# Patient Record
Sex: FEMALE | Race: WHITE | NOT HISPANIC OR LATINO | Employment: OTHER | URBAN - NONMETROPOLITAN AREA
[De-identification: names, ages, dates, MRNs, and addresses within clinical notes are randomized per-mention and may not be internally consistent; named-entity substitution may affect disease eponyms.]

---

## 2024-07-10 ENCOUNTER — OFFICE VISIT (OUTPATIENT)
Dept: SURGERY | Facility: CLINIC | Age: 69
End: 2024-07-10
Payer: MEDICARE

## 2024-07-10 ENCOUNTER — CLINICAL SUPPORT (OUTPATIENT)
Dept: PREADMISSION TESTING | Facility: HOSPITAL | Age: 69
End: 2024-07-10
Payer: COMMERCIAL

## 2024-07-10 VITALS
TEMPERATURE: 98 F | HEIGHT: 64 IN | SYSTOLIC BLOOD PRESSURE: 144 MMHG | WEIGHT: 208 LBS | DIASTOLIC BLOOD PRESSURE: 82 MMHG | BODY MASS INDEX: 35.51 KG/M2 | HEART RATE: 90 BPM

## 2024-07-10 DIAGNOSIS — K62.89 MASS OF ANUS: Primary | ICD-10-CM

## 2024-07-10 PROCEDURE — 1160F RVW MEDS BY RX/DR IN RCRD: CPT | Performed by: SURGERY

## 2024-07-10 PROCEDURE — 88305 TISSUE EXAM BY PATHOLOGIST: CPT | Performed by: PATHOLOGY

## 2024-07-10 PROCEDURE — 99204 OFFICE O/P NEW MOD 45 MIN: CPT | Performed by: SURGERY

## 2024-07-10 PROCEDURE — 1159F MED LIST DOCD IN RCRD: CPT | Performed by: SURGERY

## 2024-07-10 PROCEDURE — 88305 TISSUE EXAM BY PATHOLOGIST: CPT

## 2024-07-10 PROCEDURE — 11104 PUNCH BX SKIN SINGLE LESION: CPT | Performed by: SURGERY

## 2024-07-10 RX ORDER — AMLODIPINE BESYLATE 10 MG/1
10 TABLET ORAL
COMMUNITY
Start: 2024-05-21 | End: 2024-08-19

## 2024-07-10 RX ORDER — LOSARTAN POTASSIUM 100 MG/1
100 TABLET ORAL
COMMUNITY
Start: 2024-06-18

## 2024-07-10 RX ORDER — BUSPIRONE HYDROCHLORIDE 10 MG/1
10 TABLET ORAL
COMMUNITY
Start: 2024-07-08

## 2024-07-10 RX ORDER — GUAIFENESIN 1200 MG
TABLET, EXTENDED RELEASE 12 HR ORAL
COMMUNITY

## 2024-07-10 RX ORDER — AZELASTINE HYDROCHLORIDE 0.5 MG/ML
SOLUTION/ DROPS OPHTHALMIC
COMMUNITY
Start: 2023-09-02

## 2024-07-10 RX ORDER — OMEPRAZOLE 40 MG/1
40 CAPSULE, DELAYED RELEASE ORAL
COMMUNITY
Start: 2024-05-10

## 2024-07-10 RX ORDER — HYDROCORTISONE 25 MG/G
CREAM TOPICAL 2 TIMES DAILY
COMMUNITY
Start: 2024-07-09 | End: 2024-10-07

## 2024-07-10 RX ORDER — ATORVASTATIN CALCIUM 20 MG/1
20 TABLET, FILM COATED ORAL
COMMUNITY
Start: 2024-05-21

## 2024-07-10 RX ORDER — LANOLIN ALCOHOL/MO/W.PET/CERES
1 CREAM (GRAM) TOPICAL
COMMUNITY
Start: 2024-06-11

## 2024-07-10 RX ORDER — ERGOCALCIFEROL 1.25 MG/1
50000 CAPSULE ORAL WEEKLY
COMMUNITY
Start: 2024-06-06

## 2024-07-10 RX ORDER — MINERAL OIL
180 ENEMA (ML) RECTAL DAILY
COMMUNITY

## 2024-07-10 RX ORDER — MECLIZINE HCL 12.5 MG 12.5 MG/1
12.5 TABLET ORAL EVERY 6 HOURS PRN
COMMUNITY
Start: 2023-09-18

## 2024-07-10 RX ORDER — AMLODIPINE BESYLATE 2.5 MG/1
1 TABLET ORAL
COMMUNITY
Start: 2023-11-04 | End: 2024-07-10 | Stop reason: WASHOUT

## 2024-07-10 RX ORDER — PANCRELIPASE LIPASE, PANCRELIPASE PROTEASE, PANCRELIPASE AMYLASE 40000; 126000; 168000 [USP'U]/1; [USP'U]/1; [USP'U]/1
CAPSULE, DELAYED RELEASE ORAL
COMMUNITY
Start: 2023-08-08

## 2024-07-10 ASSESSMENT — DUKE ACTIVITY SCORE INDEX (DASI)
CAN YOU DO HEAVY WORK AROUND THE HOUSE LIKE SCRUBBING FLOORS OR LIFTING AND MOVING HEAVY FURNITURE: YES
CAN YOU WALK INDOORS, SUCH AS AROUND YOUR HOUSE: YES
TOTAL_SCORE: 45.45
CAN YOU DO LIGHT WORK AROUND THE HOUSE LIKE DUSTING OR WASHING DISHES: YES
CAN YOU WALK A BLOCK OR TWO ON LEVEL GROUND: YES
CAN YOU PARTICIPATE IN STRENOUS SPORTS LIKE SWIMMING, SINGLES TENNIS, FOOTBALL, BASKETBALL, OR SKIING: NO
CAN YOU PARTICIPATE IN MODERATE RECREATIONAL ACTIVITIES LIKE GOLF, BOWLING, DANCING, DOUBLES TENNIS OR THROWING A BASEBALL OR FOOTBALL: YES
CAN YOU RUN A SHORT DISTANCE: YES
DASI METS SCORE: 8.3
CAN YOU DO YARD WORK LIKE RAKING LEAVES, WEEDING OR PUSHING A MOWER: YES
CAN YOU DO MODERATE WORK AROUND THE HOUSE LIKE VACUUMING, SWEEPING FLOORS OR CARRYING GROCERIES: YES
CAN YOU HAVE SEXUAL RELATIONS: NO
CAN YOU TAKE CARE OF YOURSELF (EAT, DRESS, BATHE, OR USE TOILET): YES
CAN YOU CLIMB A FLIGHT OF STAIRS OR WALK UP A HILL: YES

## 2024-07-10 NOTE — H&P (VIEW-ONLY)
Subjective   Patient ID: Raquel Palacios is a 69 y.o. female who presents for a possible thrombosed hemorrhoid.    HPI   This patient noticed a bulge in the perianal area about 3 weeks ago.  It has been uncomfortable and swollen.  She has had no other abdominal complaints.  She had negative colonoscopy 4 years ago.  She is not on any anticoagulation    History reviewed. No pertinent past medical history.     Current Outpatient Medications on File Prior to Visit   Medication Sig Dispense Refill    amLODIPine (Norvasc) 10 mg tablet Take 1 tablet (10 mg) by mouth once daily.      amLODIPine (Norvasc) 2.5 mg tablet Take 1 tablet (2.5 mg) by mouth early in the morning..      atorvastatin (Lipitor) 20 mg tablet Take 1 tablet (20 mg) by mouth once daily.      azelastine (Optivar) 0.05 % ophthalmic solution INSTILL 1 DROP INTO AFFECTED EYE(S) TWICE A DAY AS NEEDED      busPIRone (Buspar) 10 mg tablet Take 1 tablet (10 mg) by mouth once daily.      ergocalciferol (Vitamin D-2) 1.25 MG (11246 UT) capsule Take 1 capsule (50,000 Units) by mouth once a week.      hydrocortisone (Anusol-HC) 2.5 % rectal cream Insert into the rectum twice a day.      lipase-protease-amylase (Zenpep) 40,000-126,000- 168,000 unit capsule 2 pills per meal, 1 per snack, 8 pills per day      losartan (Cozaar) 100 mg tablet Take 1 tablet (100 mg) by mouth once daily.      magnesium oxide (Mag-Ox) 400 mg (241.3 mg magnesium) tablet Take 1 tablet (400 mg) by mouth early in the morning..      meclizine (Antivert) 12.5 mg tablet Take 1 tablet (12.5 mg) by mouth every 6 hours if needed.      omeprazole (PriLOSEC) 40 mg DR capsule Take 1 capsule (40 mg) by mouth once daily.      acetaminophen (Tylenol) 325 mg capsule Take by mouth.      fexofenadine (Allegra) 180 mg tablet Take 1 tablet (180 mg) by mouth once daily.       No current facility-administered medications on file prior to visit.        Review of Systems   All other systems reviewed and are  negative.      Vitals:    07/10/24 1244   BP: 144/82   Pulse: 90   Temp: 36.7 °C (98 °F)        Objective     Physical Exam  Vitals reviewed. Exam conducted with a chaperone present.   Constitutional:       Appearance: Normal appearance.   HENT:      Head: Normocephalic.   Cardiovascular:      Rate and Rhythm: Normal rate and regular rhythm.      Heart sounds: Normal heart sounds.   Pulmonary:      Effort: Pulmonary effort is normal.      Breath sounds: Normal breath sounds.   Abdominal:      General: Abdomen is flat.      Palpations: Abdomen is soft. There is no mass.      Tenderness: There is no abdominal tenderness. There is no guarding.      Comments: Posterior polypoid anal mass   Musculoskeletal:         General: Normal range of motion.      Cervical back: Normal range of motion.   Skin:     General: Skin is warm.   Neurological:      General: No focal deficit present.   Psychiatric:         Mood and Affect: Mood normal.       Patient ID: Raquel Palacios is a 69 y.o. female.    General    Date/Time: 7/10/2024 1:11 PM    Performed by: Jason Bell MD  Authorized by: Jason Bell MD    Consent:     Consent obtained:  Verbal    Risks discussed:  Bleeding  Universal protocol:     Procedure explained and questions answered to patient or proxy's satisfaction: yes    Procedure specific details:      The anal mass was identified.  Small biopsy was performed using a scissors.  This was sent to pathology.    Problem List Items Addressed This Visit       Mass of anus - Primary        Assessment/Plan   Posterior anal mass.  Biopsy performed.   Colonoscopy 7.11.24       Jason Bell MD

## 2024-07-10 NOTE — PROGRESS NOTES
Subjective   Patient ID: Raquel Palacios is a 69 y.o. female who presents for a possible thrombosed hemorrhoid.    HPI   This patient noticed a bulge in the perianal area about 3 weeks ago.  It has been uncomfortable and swollen.  She has had no other abdominal complaints.  She had negative colonoscopy 4 years ago.  She is not on any anticoagulation    History reviewed. No pertinent past medical history.     Current Outpatient Medications on File Prior to Visit   Medication Sig Dispense Refill    amLODIPine (Norvasc) 10 mg tablet Take 1 tablet (10 mg) by mouth once daily.      amLODIPine (Norvasc) 2.5 mg tablet Take 1 tablet (2.5 mg) by mouth early in the morning..      atorvastatin (Lipitor) 20 mg tablet Take 1 tablet (20 mg) by mouth once daily.      azelastine (Optivar) 0.05 % ophthalmic solution INSTILL 1 DROP INTO AFFECTED EYE(S) TWICE A DAY AS NEEDED      busPIRone (Buspar) 10 mg tablet Take 1 tablet (10 mg) by mouth once daily.      ergocalciferol (Vitamin D-2) 1.25 MG (09212 UT) capsule Take 1 capsule (50,000 Units) by mouth once a week.      hydrocortisone (Anusol-HC) 2.5 % rectal cream Insert into the rectum twice a day.      lipase-protease-amylase (Zenpep) 40,000-126,000- 168,000 unit capsule 2 pills per meal, 1 per snack, 8 pills per day      losartan (Cozaar) 100 mg tablet Take 1 tablet (100 mg) by mouth once daily.      magnesium oxide (Mag-Ox) 400 mg (241.3 mg magnesium) tablet Take 1 tablet (400 mg) by mouth early in the morning..      meclizine (Antivert) 12.5 mg tablet Take 1 tablet (12.5 mg) by mouth every 6 hours if needed.      omeprazole (PriLOSEC) 40 mg DR capsule Take 1 capsule (40 mg) by mouth once daily.      acetaminophen (Tylenol) 325 mg capsule Take by mouth.      fexofenadine (Allegra) 180 mg tablet Take 1 tablet (180 mg) by mouth once daily.       No current facility-administered medications on file prior to visit.        Review of Systems   All other systems reviewed and are  negative.      Vitals:    07/10/24 1244   BP: 144/82   Pulse: 90   Temp: 36.7 °C (98 °F)        Objective     Physical Exam  Vitals reviewed. Exam conducted with a chaperone present.   Constitutional:       Appearance: Normal appearance.   HENT:      Head: Normocephalic.   Cardiovascular:      Rate and Rhythm: Normal rate and regular rhythm.      Heart sounds: Normal heart sounds.   Pulmonary:      Effort: Pulmonary effort is normal.      Breath sounds: Normal breath sounds.   Abdominal:      General: Abdomen is flat.      Palpations: Abdomen is soft. There is no mass.      Tenderness: There is no abdominal tenderness. There is no guarding.      Comments: Posterior polypoid anal mass   Musculoskeletal:         General: Normal range of motion.      Cervical back: Normal range of motion.   Skin:     General: Skin is warm.   Neurological:      General: No focal deficit present.   Psychiatric:         Mood and Affect: Mood normal.       Patient ID: Raquel Palacios is a 69 y.o. female.    General    Date/Time: 7/10/2024 1:11 PM    Performed by: Jason Bell MD  Authorized by: Jason Bell MD    Consent:     Consent obtained:  Verbal    Risks discussed:  Bleeding  Universal protocol:     Procedure explained and questions answered to patient or proxy's satisfaction: yes    Procedure specific details:      The anal mass was identified.  Small biopsy was performed using a scissors.  This was sent to pathology.    Problem List Items Addressed This Visit       Mass of anus - Primary        Assessment/Plan   Posterior anal mass.  Biopsy performed.   Colonoscopy 7.11.24       Jason Bell MD

## 2024-07-10 NOTE — PREPROCEDURE INSTRUCTIONS
Medication List            Accurate as of July 10, 2024  2:10 PM. Always use your most recent med list.                acetaminophen 325 mg capsule  Commonly known as: Tylenol  Medication Adjustments for Surgery: Take morning of surgery with sip of water, no other fluids     amLODIPine 10 mg tablet  Commonly known as: Norvasc  Medication Adjustments for Surgery: Take morning of surgery with sip of water, no other fluids     atorvastatin 20 mg tablet  Commonly known as: Lipitor  Medication Adjustments for Surgery: Continue until night before surgery     azelastine 0.05 % ophthalmic solution  Commonly known as: Optivar  Medication Adjustments for Surgery: Other (Comment)     busPIRone 10 mg tablet  Commonly known as: Buspar  Medication Adjustments for Surgery: Continue until night before surgery     ergocalciferol 1.25 MG (70549 UT) capsule  Commonly known as: Vitamin D-2  Medication Adjustments for Surgery: Other (Comment)     fexofenadine 180 mg tablet  Commonly known as: Allegra  Medication Adjustments for Surgery: Take morning of surgery with sip of water, no other fluids     hydrocortisone 2.5 % rectal cream  Commonly known as: Anusol-HC  Medication Adjustments for Surgery: Continue until night before surgery     losartan 100 mg tablet  Commonly known as: Cozaar  Medication Adjustments for Surgery: Continue until night before surgery     magnesium oxide 400 mg (241.3 mg magnesium) tablet  Commonly known as: Mag-Ox  Medication Adjustments for Surgery: Stop 1 day before surgery     meclizine 12.5 mg tablet  Commonly known as: Antivert  Medication Adjustments for Surgery: Other (Comment)     omeprazole 40 mg DR capsule  Commonly known as: PriLOSEC  Medication Adjustments for Surgery: Take morning of surgery with sip of water, no other fluids     Zenpep 40,000-126,000- 168,000 unit capsule  Generic drug: lipase-protease-amylase  Medication Adjustments for Surgery: Other (Comment)                   - Call  Outpatient Surgery the day before procedure/surgery (Friday for a Monday procedure) between 1-3 pm to get your arrival time:  [phone]    -You can have clear liquids ( Water, Tea, Pop/Soda, Gatorade/Powerade, Black coffee) up to 3 hours before your procedure. NO JUICES, NO DAIRY, NO CREAMERS, NO HALF/HALF, NO NON-DAIRY CREAMERS/POWDERS.    -Please refrain from smoking THC, cigarettes, and/or vaping prior to your procedure for at least 24 hours.     - On day of procedure, have a  (if having anesthesia or sedation). Park in the back parking lot and come through the ER lobby. Take elevator to second floor outpatient dept. Check in at the outpatient surgery window.    - Wear comfortable clothes, remove all jewelry and piercings.    -Glasses, contacts, and/or dentures may have to be removed. Bring a glass/contact case. A denture cup will be provided.    -Please shower or bathe in the morning using an antibacterial soap. No makeup. Follow instructions if you are issued Hibiclens soap and/or mouth wash.     -No more than 2 visitors at your bedside.

## 2024-07-10 NOTE — PATIENT INSTRUCTIONS
You have a mass in the anal region.  I performed a biopsy.  As he did not eat anything today we will schedule you for a colonoscopy tomorrow on 7/11/2024.  My office will provide you with preprocedure instructions.

## 2024-07-11 ENCOUNTER — ANESTHESIA EVENT (OUTPATIENT)
Dept: GASTROENTEROLOGY | Facility: HOSPITAL | Age: 69
End: 2024-07-11
Payer: MEDICARE

## 2024-07-11 ENCOUNTER — HOSPITAL ENCOUNTER (OUTPATIENT)
Dept: GASTROENTEROLOGY | Facility: HOSPITAL | Age: 69
Setting detail: OUTPATIENT SURGERY
Discharge: HOME | End: 2024-07-11
Payer: MEDICARE

## 2024-07-11 ENCOUNTER — ANESTHESIA (OUTPATIENT)
Dept: GASTROENTEROLOGY | Facility: HOSPITAL | Age: 69
End: 2024-07-11
Payer: MEDICARE

## 2024-07-11 VITALS
WEIGHT: 208 LBS | RESPIRATION RATE: 16 BRPM | HEIGHT: 64 IN | OXYGEN SATURATION: 91 % | DIASTOLIC BLOOD PRESSURE: 72 MMHG | SYSTOLIC BLOOD PRESSURE: 119 MMHG | TEMPERATURE: 97.2 F | HEART RATE: 84 BPM | BODY MASS INDEX: 35.51 KG/M2

## 2024-07-11 DIAGNOSIS — K62.89 MASS OF ANUS: ICD-10-CM

## 2024-07-11 DIAGNOSIS — K64.5 THROMBOSED EXTERNAL HEMORRHOID: Primary | ICD-10-CM

## 2024-07-11 PROBLEM — I10 HTN (HYPERTENSION): Status: ACTIVE | Noted: 2024-07-11

## 2024-07-11 PROCEDURE — 2500000004 HC RX 250 GENERAL PHARMACY W/ HCPCS (ALT 636 FOR OP/ED): Performed by: PHYSICIAN ASSISTANT

## 2024-07-11 PROCEDURE — 46083 INC THROMBOSED HROID XTRNL: CPT | Performed by: SURGERY

## 2024-07-11 PROCEDURE — 3700000002 HC GENERAL ANESTHESIA TIME - EACH INCREMENTAL 1 MINUTE

## 2024-07-11 PROCEDURE — 7100000009 HC PHASE TWO TIME - INITIAL BASE CHARGE

## 2024-07-11 PROCEDURE — 7100000010 HC PHASE TWO TIME - EACH INCREMENTAL 1 MINUTE

## 2024-07-11 PROCEDURE — 2500000005 HC RX 250 GENERAL PHARMACY W/O HCPCS: Performed by: NURSE ANESTHETIST, CERTIFIED REGISTERED

## 2024-07-11 PROCEDURE — 3700000001 HC GENERAL ANESTHESIA TIME - INITIAL BASE CHARGE

## 2024-07-11 PROCEDURE — 45380 COLONOSCOPY AND BIOPSY: CPT | Performed by: SURGERY

## 2024-07-11 PROCEDURE — 2500000004 HC RX 250 GENERAL PHARMACY W/ HCPCS (ALT 636 FOR OP/ED): Performed by: NURSE ANESTHETIST, CERTIFIED REGISTERED

## 2024-07-11 RX ORDER — LIDOCAINE HYDROCHLORIDE 20 MG/ML
INJECTION, SOLUTION EPIDURAL; INFILTRATION; INTRACAUDAL; PERINEURAL AS NEEDED
Status: DISCONTINUED | OUTPATIENT
Start: 2024-07-11 | End: 2024-07-11

## 2024-07-11 RX ORDER — ESCITALOPRAM OXALATE 20 MG/1
20 TABLET ORAL DAILY
COMMUNITY

## 2024-07-11 RX ORDER — PROPOFOL 10 MG/ML
INJECTION, EMULSION INTRAVENOUS AS NEEDED
Status: DISCONTINUED | OUTPATIENT
Start: 2024-07-11 | End: 2024-07-11

## 2024-07-11 RX ORDER — ONDANSETRON HYDROCHLORIDE 2 MG/ML
4 INJECTION, SOLUTION INTRAVENOUS ONCE AS NEEDED
Status: CANCELLED | OUTPATIENT
Start: 2024-07-11

## 2024-07-11 RX ORDER — SODIUM CHLORIDE, SODIUM LACTATE, POTASSIUM CHLORIDE, CALCIUM CHLORIDE 600; 310; 30; 20 MG/100ML; MG/100ML; MG/100ML; MG/100ML
100 INJECTION, SOLUTION INTRAVENOUS CONTINUOUS
Status: DISCONTINUED | OUTPATIENT
Start: 2024-07-11 | End: 2024-07-12 | Stop reason: HOSPADM

## 2024-07-11 SDOH — HEALTH STABILITY: MENTAL HEALTH: CURRENT SMOKER: 1

## 2024-07-11 ASSESSMENT — COLUMBIA-SUICIDE SEVERITY RATING SCALE - C-SSRS
6. HAVE YOU EVER DONE ANYTHING, STARTED TO DO ANYTHING, OR PREPARED TO DO ANYTHING TO END YOUR LIFE?: NO
2. HAVE YOU ACTUALLY HAD ANY THOUGHTS OF KILLING YOURSELF?: NO
1. IN THE PAST MONTH, HAVE YOU WISHED YOU WERE DEAD OR WISHED YOU COULD GO TO SLEEP AND NOT WAKE UP?: NO

## 2024-07-11 ASSESSMENT — PAIN - FUNCTIONAL ASSESSMENT
PAIN_FUNCTIONAL_ASSESSMENT: 0-10

## 2024-07-11 ASSESSMENT — PAIN SCALES - GENERAL
PAINLEVEL_OUTOF10: 0 - NO PAIN
PAINLEVEL_OUTOF10: 4
PAINLEVEL_OUTOF10: 0 - NO PAIN
PAINLEVEL_OUTOF10: 0 - NO PAIN

## 2024-07-11 NOTE — DISCHARGE INSTRUCTIONS
Patient Instructions after a Colonoscopy      The anesthetics, sedatives or narcotics which were given to you today will be acting in your body for the next 24 hours, so you might feel a little sleepy or groggy.  This feeling should slowly wear off. Carefully read and follow the instructions.     You received sedation today:  - Do not drive or operate any machinery or power tools of any kind.   - No alcoholic beverages today, not even beer or wine.  - Do not make any important decisions or sign any legal documents.  - No over the counter medications that contain alcohol or that may cause drowsiness.  - Do not make any important decisions or sign any legal documents.  - Make sure you have someone with you for first 24 hours.    While it is common to experience mild to moderate abdominal distention, gas, or belching after your procedure, if any of these symptoms occur following discharge from the GI Lab or within one week of having your procedure, call the [facility] to be advised whether a visit to your nearest Urgent Care or Emergency Department is indicated.  Take this paper with you if you go.     - If you develop an allergic reaction to the medications that were given during your procedure such as difficulty breathing, rash, hives, severe nausea, vomiting or lightheadedness.  - If you experience chest pain, shortness of breath, severe abdominal pain, fevers and chills.  -If you develop signs and symptoms of bleeding such as blood in your spit, if your stools turn black, tarry, or bloody  - If you have not urinated within 8 hours following your procedure.  - If your IV site becomes painful, red, inflamed, or looks infected.    If you received a biopsy/polypectomy/sphincterotomy the following instructions apply below:    __ Do not use Aspirin containing products, non-steroidal medications or anti-coagulants for one week following your procedure. (Examples of these types of medications are: Advil,  Arthrotec, Aleve, Coumadin, Ecotrin, Heparin, Ibuprofen, Indocin, Motrin, Naprosyn, Nuprin, Plavix, Vioxx, and Voltarin, or their generic forms.  This list is not all-inclusive.  Check with your physician or pharmacist before resuming medications.)   __ Eat a soft diet today.  Avoid foods that are poorly digested for the next 24 hours.  These foods would include: nuts, beans, lettuce, red meats, and fried foods. Start with liquids and advance your diet as tolerated, gradually work up to eating solids.   __ Do not have a Barium Study or Enema for one week.    Your physician recommends the additional following instructions:    -You have a contact number available for emergencies. The signs and symptoms of potential delayed complications were discussed with you. You may return to normal activities tomorrow.  -Resume your previous diet.  -Continue your present medications.   -We are waiting for your pathology results.  -Your physician has recommended a repeat colonoscopy (date to be determined after pending pathology results are reviewed) for surveillance based on pathology results.  -The findings and recommendations have been discussed with you.  -The findings and recommendations were discussed with your family.  - Please see Medication Reconciliation Form for new medication/medications prescribed.       If you experience any problems or have any questions following discharge from the GI Lab, please call:        Nurse Signature                                                                        Date___________________                                                                            Patient/Responsible Party Signature                                        Date___________________

## 2024-07-11 NOTE — PROCEDURES
Incision and Drainage    Date/Time: 7/11/2024 7:57 AM    Performed by: Jason Bell MD  Authorized by: Jason Bell MD    Consent:     Consent obtained:  Verbal  Universal protocol:     Procedure explained and questions answered to patient or proxy's satisfaction: yes      Patient identity confirmed:  Verbally with patient  Location:     Type:  External thrombosed hemorrhoid    Location:  Anogenital    Anogenital location:  Rectum  Procedure details:     Incision types:  Elliptical  Comments:      The external thrombosed hemorrhoid was excised a large clot was extruded.  Dressings were placed.

## 2024-07-11 NOTE — ANESTHESIA POSTPROCEDURE EVALUATION
Patient: Raquel Palacios    Procedure Summary       Date: 07/11/24 Room / Location: [facility]    Anesthesia Start: 0723 Anesthesia Stop: 0749    Procedure: COLONOSCOPY Diagnosis:       Mass of anus      Mass of anus    Scheduled Providers: Jason Bell MD Responsible Provider: DIONICIO Sterling    Anesthesia Type: MAC ASA Status: 2            Anesthesia Type: MAC    Vitals Value Taken Time   /72 07/11/24 0805   Temp 36.2 °C (97.2 °F) 07/11/24 0749   Pulse 84 07/11/24 0805   Resp 16 07/11/24 0805   SpO2 94% 07/11/24 0749       Anesthesia Post Evaluation    Patient location during evaluation: PACU  Patient participation: complete - patient participated  Level of consciousness: awake and alert and awake  Pain management: adequate  Airway patency: patent  Cardiovascular status: acceptable  Respiratory status: room air, spontaneous ventilation and acceptable  Hydration status: acceptable  Postoperative Nausea and Vomiting: none        There were no known notable events for this encounter.

## 2024-07-11 NOTE — ANESTHESIA PREPROCEDURE EVALUATION
Patient: Raquel Palacios    Procedure Information       Date/Time: 07/11/24 0815    Scheduled providers: Jason Bell MD    Procedure: COLONOSCOPY    Location: [facility]            Relevant Problems   Anesthesia (within normal limits)      Cardiac   (+) HTN (hypertension)      Pulmonary (within normal limits)      GI (within normal limits)      Endocrine (within normal limits)       Clinical information reviewed:   Tobacco  Allergies  Meds   Med Hx  Surg Hx  OB Status  Fam Hx  Soc   Hx        NPO Detail:  NPO/Void Status  Carbohydrate Drink Given Prior to Surgery? : N  Date of Last Liquid: 07/11/24  Time of Last Liquid: 0500  Date of Last Solid: 07/09/24  Time of Last Solid: 1800  Last Intake Type: Clear fluids         Physical Exam    Airway  Mallampati: III  TM distance: >3 FB     Cardiovascular   Rhythm: regular  Rate: normal     Dental    Pulmonary - normal exam     Abdominal            Anesthesia Plan    History of general anesthesia?: yes  History of complications of general anesthesia?: no    ASA 2     MAC     The patient is a current smoker.  Patient was previously instructed to abstain from smoking on day of procedure.  Patient smoked on day of procedure.    intravenous induction   Anesthetic plan and risks discussed with patient.  Use of blood products discussed with patient who consented to blood products.    Plan discussed with CRNA.

## 2024-07-11 NOTE — SIGNIFICANT EVENT
Dr Bell at bedside speaking with patient and family member.  Dr. Jennifer diaz to go home with patient.

## 2024-07-22 ENCOUNTER — TELEPHONE (OUTPATIENT)
Dept: SURGERY | Facility: HOSPITAL | Age: 69
End: 2024-07-22
Payer: MEDICARE

## 2024-07-22 DIAGNOSIS — C21.0 SQUAMOUS CELL CANCER, ANUS (MULTI): ICD-10-CM

## 2024-07-22 DIAGNOSIS — C21.0 SQUAMOUS CELL CANCER, ANUS (MULTI): Primary | ICD-10-CM

## 2024-07-22 LAB
LAB AP ASR DISCLAIMER: NORMAL
LABORATORY COMMENT REPORT: NORMAL
PATH REPORT.FINAL DX SPEC: NORMAL
PATH REPORT.GROSS SPEC: NORMAL
PATH REPORT.TOTAL CANCER: NORMAL

## 2024-07-22 NOTE — TELEPHONE ENCOUNTER
I spoke with the patient today re new diagnosis of SCC anus. Will require chemo / radiation - referrals made / CT C/A/P ordered

## 2024-07-23 ENCOUNTER — LAB (OUTPATIENT)
Dept: LAB | Facility: LAB | Age: 69
End: 2024-07-23
Payer: MEDICARE

## 2024-07-23 ENCOUNTER — HOSPITAL ENCOUNTER (OUTPATIENT)
Dept: RADIOLOGY | Facility: HOSPITAL | Age: 69
Discharge: HOME | End: 2024-07-23
Payer: MEDICARE

## 2024-07-23 DIAGNOSIS — C21.0 SQUAMOUS CELL CANCER, ANUS (MULTI): ICD-10-CM

## 2024-07-23 LAB
CREAT SERPL-MCNC: 1.19 MG/DL (ref 0.5–1.05)
EGFRCR SERPLBLD CKD-EPI 2021: 50 ML/MIN/1.73M*2

## 2024-07-23 PROCEDURE — 71260 CT THORAX DX C+: CPT | Performed by: RADIOLOGY

## 2024-07-23 PROCEDURE — 82565 ASSAY OF CREATININE: CPT

## 2024-07-23 PROCEDURE — 74177 CT ABD & PELVIS W/CONTRAST: CPT | Performed by: RADIOLOGY

## 2024-07-23 PROCEDURE — 2550000001 HC RX 255 CONTRASTS: Performed by: SURGERY

## 2024-07-23 PROCEDURE — 74177 CT ABD & PELVIS W/CONTRAST: CPT

## 2024-07-23 PROCEDURE — 36415 COLL VENOUS BLD VENIPUNCTURE: CPT

## 2024-07-23 NOTE — PROGRESS NOTES
Raquel Palacios is a 69 year old female referred by Dr. 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    7/11/2024 Colonoscopy to cecum (colored photos in Epic)  Findings  Single adenomatous-appearing mass in the posterior anal region observed during perianal exam, covering one quarter of the circumference; bleeding occurred after intervention; performed cold forceps biopsy  One left lateral external medium, protruding hemorrhoid observed during perianal exam; no bleeding was identified. Incised  Multiple medium diverticula in the ascending colon, hepatic flexure, transverse colon, descending colon and sigmoid colon  Pathology:  A. ANUS BIOPSY:   Squamous cell carcinoma, see note  Note: Immunostain for p40 is positive and immunostain for p16 is diffusely positive (strong).       7/24/2024 Appointment with oncology    8/06/2024 CT Chest/Abd/Pelvis with contrast        Past Medical History  HTN  Anal SCC      Surgical History  Hernia Repai      Social History  Smoking:   ETOH:    Family History        Review of Systems  Constitutional: Negative for fever, chills, anorexia, weight loss, malaise     ENMT: Negative for nasal discharge, congestion, ear pain, mouth pain, throat pain     Respiratory: Negative for cough, hemoptysis, wheezing, shortness of breath     Cardiac: Negative for chest pain, dyspnea on exertion, orthopnea, palpitations, syncope, (+)HTN     Gastrointestinal: Negative for nausea, vomiting, diarrhea, constipation, abdominal pain, (+)ANAL SCC    Genitourinary: Negative for discharge, dysuria, flank pain, frequency, hematuria     Musculoskeletal: Negative for decreased ROM, pain, swelling, weakness     Neurological: Negative for dizziness, confusion, headache, seizures, syncope     Psychiatric: Negative for mood changes, anxiety, hallucinations, sleep changes, suicidal ideas     Skin: Negative for mass, pain, itching, rash, ulcer     Endocrine: Negative for heat intolerance, cold  intolerance, excessive sweating, polyuria, excess thirst     Hematologic/Lymph: Negative for anemia, bruising, easy bleeding, night sweats, petechiae, history of DVT/PE or cancer     Allergic/Immunologic: Negative for anaphylaxis, itchy/ teary eyes, itching, sneezing, swelling    Physical Exam  Constitutional: Well developed, awake/alert/oriented x3, no distress, alert and cooperative             Eyes: Sclera anicteric, no conjunctival inflammation, conjugate gaze    ENMT: mucous membranes moist, no apparent injury,            Head/Neck: Neck supple, no apparent injury, No JVD, trachea midline, no bruits              Respiratory/Thorax: Patent airways, CTAB, normal breath sounds with good chest expansion, thorax symmetric         Cardiovascular: Regular, rate and rhythm, no murmurs, normal S1 and S2         Gastrointestinal: Nondistended, soft, non-tender, no rebound tenderness or guarding, no masses palpable, no organomegaly, +BS, no bruits               Extremities: normal extremities, no cyanosis edema, contusions or wounds, 2+ femoral pulses B/L              Neurological: alert and oriented x3, normal strength, Normal gait          Lymphatic: No palpable inguinal lymphadenopathy   Psychological: Appropriate mood and behavior         Skin: Warm and dry, no lesions, no rashes                Anorectal:      Impression:      Plan:

## 2024-07-24 ENCOUNTER — LAB (OUTPATIENT)
Dept: LAB | Facility: LAB | Age: 69
End: 2024-07-24
Payer: MEDICARE

## 2024-07-24 ENCOUNTER — OFFICE VISIT (OUTPATIENT)
Dept: HEMATOLOGY/ONCOLOGY | Facility: HOSPITAL | Age: 69
End: 2024-07-24
Payer: MEDICARE

## 2024-07-24 VITALS
OXYGEN SATURATION: 97 % | HEIGHT: 64 IN | HEART RATE: 94 BPM | DIASTOLIC BLOOD PRESSURE: 85 MMHG | SYSTOLIC BLOOD PRESSURE: 124 MMHG | BODY MASS INDEX: 35.44 KG/M2 | TEMPERATURE: 98.4 F | WEIGHT: 207.56 LBS

## 2024-07-24 DIAGNOSIS — C21.0 SQUAMOUS CELL CANCER, ANUS (MULTI): ICD-10-CM

## 2024-07-24 DIAGNOSIS — R53.83 OTHER FATIGUE: ICD-10-CM

## 2024-07-24 DIAGNOSIS — C21.0 SQUAMOUS CELL CANCER, ANUS (MULTI): Primary | ICD-10-CM

## 2024-07-24 LAB
ALBUMIN SERPL BCP-MCNC: 4.2 G/DL (ref 3.4–5)
ALP SERPL-CCNC: 126 U/L (ref 33–136)
ALT SERPL W P-5'-P-CCNC: 22 U/L (ref 7–45)
ANION GAP SERPL CALC-SCNC: 10 MMOL/L (ref 10–20)
AST SERPL W P-5'-P-CCNC: 20 U/L (ref 9–39)
BASOPHILS # BLD AUTO: 0.06 X10*3/UL (ref 0–0.1)
BASOPHILS NFR BLD AUTO: 0.6 %
BILIRUB SERPL-MCNC: 0.4 MG/DL (ref 0–1.2)
BUN SERPL-MCNC: 13 MG/DL (ref 6–23)
CALCIUM SERPL-MCNC: 9.2 MG/DL (ref 8.6–10.3)
CHLORIDE SERPL-SCNC: 103 MMOL/L (ref 98–107)
CO2 SERPL-SCNC: 31 MMOL/L (ref 21–32)
CREAT SERPL-MCNC: 1.21 MG/DL (ref 0.5–1.05)
EGFRCR SERPLBLD CKD-EPI 2021: 49 ML/MIN/1.73M*2
EOSINOPHIL # BLD AUTO: 0.17 X10*3/UL (ref 0–0.7)
EOSINOPHIL NFR BLD AUTO: 1.8 %
ERYTHROCYTE [DISTWIDTH] IN BLOOD BY AUTOMATED COUNT: 14.4 % (ref 11.5–14.5)
GLUCOSE SERPL-MCNC: 92 MG/DL (ref 74–99)
HCT VFR BLD AUTO: 40.4 % (ref 36–46)
HGB BLD-MCNC: 13.2 G/DL (ref 12–16)
IMM GRANULOCYTES # BLD AUTO: 0.03 X10*3/UL (ref 0–0.7)
IMM GRANULOCYTES NFR BLD AUTO: 0.3 % (ref 0–0.9)
LDH SERPL L TO P-CCNC: 186 U/L (ref 84–246)
LYMPHOCYTES # BLD AUTO: 2.21 X10*3/UL (ref 1.2–4.8)
LYMPHOCYTES NFR BLD AUTO: 22.8 %
MAGNESIUM SERPL-MCNC: 2.03 MG/DL (ref 1.6–2.4)
MCH RBC QN AUTO: 29.1 PG (ref 26–34)
MCHC RBC AUTO-ENTMCNC: 32.7 G/DL (ref 32–36)
MCV RBC AUTO: 89 FL (ref 80–100)
MONOCYTES # BLD AUTO: 0.73 X10*3/UL (ref 0.1–1)
MONOCYTES NFR BLD AUTO: 7.5 %
NEUTROPHILS # BLD AUTO: 6.51 X10*3/UL (ref 1.2–7.7)
NEUTROPHILS NFR BLD AUTO: 67 %
NRBC BLD-RTO: 0 /100 WBCS (ref 0–0)
PLATELET # BLD AUTO: 409 X10*3/UL (ref 150–450)
POTASSIUM SERPL-SCNC: 4.4 MMOL/L (ref 3.5–5.3)
PROT SERPL-MCNC: 7.2 G/DL (ref 6.4–8.2)
RBC # BLD AUTO: 4.54 X10*6/UL (ref 4–5.2)
SODIUM SERPL-SCNC: 140 MMOL/L (ref 136–145)
TSH SERPL-ACNC: 4.47 MIU/L (ref 0.44–3.98)
WBC # BLD AUTO: 9.7 X10*3/UL (ref 4.4–11.3)

## 2024-07-24 PROCEDURE — 83615 LACTATE (LD) (LDH) ENZYME: CPT

## 2024-07-24 PROCEDURE — 3079F DIAST BP 80-89 MM HG: CPT | Performed by: INTERNAL MEDICINE

## 2024-07-24 PROCEDURE — 80053 COMPREHEN METABOLIC PANEL: CPT

## 2024-07-24 PROCEDURE — 1159F MED LIST DOCD IN RCRD: CPT | Performed by: INTERNAL MEDICINE

## 2024-07-24 PROCEDURE — 83735 ASSAY OF MAGNESIUM: CPT

## 2024-07-24 PROCEDURE — 84443 ASSAY THYROID STIM HORMONE: CPT

## 2024-07-24 PROCEDURE — 3074F SYST BP LT 130 MM HG: CPT | Performed by: INTERNAL MEDICINE

## 2024-07-24 PROCEDURE — 3008F BODY MASS INDEX DOCD: CPT | Performed by: INTERNAL MEDICINE

## 2024-07-24 PROCEDURE — 85025 COMPLETE CBC W/AUTO DIFF WBC: CPT

## 2024-07-24 PROCEDURE — 99205 OFFICE O/P NEW HI 60 MIN: CPT | Performed by: INTERNAL MEDICINE

## 2024-07-24 PROCEDURE — 36415 COLL VENOUS BLD VENIPUNCTURE: CPT

## 2024-07-24 PROCEDURE — 99215 OFFICE O/P EST HI 40 MIN: CPT | Performed by: INTERNAL MEDICINE

## 2024-07-24 PROCEDURE — 1126F AMNT PAIN NOTED NONE PRSNT: CPT | Performed by: INTERNAL MEDICINE

## 2024-07-24 RX ORDER — PROCHLORPERAZINE MALEATE 5 MG
10 TABLET ORAL EVERY 6 HOURS PRN
OUTPATIENT
Start: 2024-09-09

## 2024-07-24 RX ORDER — PROCHLORPERAZINE MALEATE 10 MG
10 TABLET ORAL EVERY 6 HOURS PRN
Qty: 30 TABLET | Refills: 5 | Status: SHIPPED | OUTPATIENT
Start: 2024-07-24

## 2024-07-24 RX ORDER — ALBUTEROL SULFATE 0.83 MG/ML
3 SOLUTION RESPIRATORY (INHALATION) AS NEEDED
OUTPATIENT
Start: 2024-09-09

## 2024-07-24 RX ORDER — LOPERAMIDE HYDROCHLORIDE 2 MG/1
CAPSULE ORAL
Qty: 100 CAPSULE | Refills: 2 | Status: SHIPPED | OUTPATIENT
Start: 2024-07-24

## 2024-07-24 RX ORDER — DIPHENHYDRAMINE HYDROCHLORIDE 50 MG/ML
50 INJECTION INTRAMUSCULAR; INTRAVENOUS AS NEEDED
OUTPATIENT
Start: 2024-08-12

## 2024-07-24 RX ORDER — PROCHLORPERAZINE MALEATE 5 MG
10 TABLET ORAL EVERY 6 HOURS PRN
OUTPATIENT
Start: 2024-08-12

## 2024-07-24 RX ORDER — ALBUTEROL SULFATE 0.83 MG/ML
3 SOLUTION RESPIRATORY (INHALATION) AS NEEDED
OUTPATIENT
Start: 2024-08-12

## 2024-07-24 RX ORDER — PROCHLORPERAZINE EDISYLATE 5 MG/ML
10 INJECTION INTRAMUSCULAR; INTRAVENOUS EVERY 6 HOURS PRN
OUTPATIENT
Start: 2024-08-12

## 2024-07-24 RX ORDER — FAMOTIDINE 10 MG/ML
20 INJECTION INTRAVENOUS ONCE AS NEEDED
OUTPATIENT
Start: 2024-08-12

## 2024-07-24 RX ORDER — CAPECITABINE 500 MG/1
500 TABLET, FILM COATED ORAL 2 TIMES DAILY
Qty: 60 TABLET | Refills: 0 | Status: SHIPPED | OUTPATIENT
Start: 2024-08-12 | End: 2024-09-11

## 2024-07-24 RX ORDER — EPINEPHRINE 0.3 MG/.3ML
0.3 INJECTION SUBCUTANEOUS EVERY 5 MIN PRN
OUTPATIENT
Start: 2024-08-12

## 2024-07-24 RX ORDER — ONDANSETRON HYDROCHLORIDE 2 MG/ML
8 INJECTION, SOLUTION INTRAVENOUS ONCE
OUTPATIENT
Start: 2024-08-12

## 2024-07-24 RX ORDER — DIPHENHYDRAMINE HYDROCHLORIDE 50 MG/ML
50 INJECTION INTRAMUSCULAR; INTRAVENOUS AS NEEDED
OUTPATIENT
Start: 2024-09-09

## 2024-07-24 RX ORDER — PROCHLORPERAZINE EDISYLATE 5 MG/ML
10 INJECTION INTRAMUSCULAR; INTRAVENOUS EVERY 6 HOURS PRN
OUTPATIENT
Start: 2024-09-09

## 2024-07-24 RX ORDER — FAMOTIDINE 10 MG/ML
20 INJECTION INTRAVENOUS ONCE AS NEEDED
OUTPATIENT
Start: 2024-09-09

## 2024-07-24 RX ORDER — ONDANSETRON HYDROCHLORIDE 2 MG/ML
8 INJECTION, SOLUTION INTRAVENOUS ONCE
OUTPATIENT
Start: 2024-09-09

## 2024-07-24 RX ORDER — MITOMYCIN 20 MG/40ML
10 INJECTION, POWDER, LYOPHILIZED, FOR SOLUTION INTRAVENOUS ONCE
OUTPATIENT
Start: 2024-09-09

## 2024-07-24 RX ORDER — MITOMYCIN 20 MG/40ML
10 INJECTION, POWDER, LYOPHILIZED, FOR SOLUTION INTRAVENOUS ONCE
OUTPATIENT
Start: 2024-08-12

## 2024-07-24 RX ORDER — EPINEPHRINE 0.3 MG/.3ML
0.3 INJECTION SUBCUTANEOUS EVERY 5 MIN PRN
OUTPATIENT
Start: 2024-09-09

## 2024-07-24 RX ORDER — CAPECITABINE 150 MG/1
1200 TABLET, FILM COATED ORAL 2 TIMES DAILY
Qty: 480 TABLET | Refills: 0 | Status: SHIPPED | OUTPATIENT
Start: 2024-08-12 | End: 2024-09-11

## 2024-07-24 RX ORDER — ONDANSETRON HYDROCHLORIDE 8 MG/1
8 TABLET, FILM COATED ORAL EVERY 8 HOURS PRN
Qty: 30 TABLET | Refills: 5 | Status: SHIPPED | OUTPATIENT
Start: 2024-07-24

## 2024-07-24 SDOH — ECONOMIC STABILITY: GENERAL
WHICH OF THE FOLLOWING DO YOU KNOW HOW TO USE AND HAVE ACCESS TO EVERY DAY? (CHOOSE ALL THAT APPLY): SMARTPHONE WITH CELLULAR DATA PLAN;DESKTOP COMPUTER, LAPTOP COMPUTER, OR TABLET WITH BROADBAND INTERNET CONNECTION

## 2024-07-24 SDOH — HEALTH STABILITY: PHYSICAL HEALTH: ON AVERAGE, HOW MANY MINUTES DO YOU ENGAGE IN EXERCISE AT THIS LEVEL?: 0 MIN

## 2024-07-24 SDOH — HEALTH STABILITY: PHYSICAL HEALTH: ON AVERAGE, HOW MANY DAYS PER WEEK DO YOU ENGAGE IN MODERATE TO STRENUOUS EXERCISE (LIKE A BRISK WALK)?: 0 DAYS

## 2024-07-24 SDOH — ECONOMIC STABILITY: FOOD INSECURITY: WITHIN THE PAST 12 MONTHS, YOU WORRIED THAT YOUR FOOD WOULD RUN OUT BEFORE YOU GOT MONEY TO BUY MORE.: NEVER TRUE

## 2024-07-24 SDOH — ECONOMIC STABILITY: FOOD INSECURITY: WITHIN THE PAST 12 MONTHS, THE FOOD YOU BOUGHT JUST DIDN'T LAST AND YOU DIDN'T HAVE MONEY TO GET MORE.: NEVER TRUE

## 2024-07-24 SDOH — ECONOMIC STABILITY: GENERAL
WHICH OF THE FOLLOWING WOULD YOU LIKE TO GET CONNECTED TO IN ORDER TO RECEIVE A DISCOUNT OR FOR FREE? (CHOOSE ALL THAT APPLY): NONE OF THESE

## 2024-07-24 SDOH — ECONOMIC STABILITY: TRANSPORTATION INSECURITY
IN THE PAST 12 MONTHS, HAS LACK OF TRANSPORTATION KEPT YOU FROM MEETINGS, WORK, OR FROM GETTING THINGS NEEDED FOR DAILY LIVING?: NO

## 2024-07-24 SDOH — ECONOMIC STABILITY: INCOME INSECURITY: IN THE LAST 12 MONTHS, WAS THERE A TIME WHEN YOU WERE NOT ABLE TO PAY THE MORTGAGE OR RENT ON TIME?: NO

## 2024-07-24 SDOH — ECONOMIC STABILITY: TRANSPORTATION INSECURITY
IN THE PAST 12 MONTHS, HAS THE LACK OF TRANSPORTATION KEPT YOU FROM MEDICAL APPOINTMENTS OR FROM GETTING MEDICATIONS?: NO

## 2024-07-24 ASSESSMENT — COLUMBIA-SUICIDE SEVERITY RATING SCALE - C-SSRS
2. HAVE YOU ACTUALLY HAD ANY THOUGHTS OF KILLING YOURSELF?: NO
1. IN THE PAST MONTH, HAVE YOU WISHED YOU WERE DEAD OR WISHED YOU COULD GO TO SLEEP AND NOT WAKE UP?: NO
6. HAVE YOU EVER DONE ANYTHING, STARTED TO DO ANYTHING, OR PREPARED TO DO ANYTHING TO END YOUR LIFE?: NO

## 2024-07-24 ASSESSMENT — SOCIAL DETERMINANTS OF HEALTH (SDOH)
HOW OFTEN DO YOU ATTEND CHURCH OR RELIGIOUS SERVICES?: MORE THAN 4 TIMES PER YEAR
WITHIN THE LAST YEAR, HAVE YOU BEEN KICKED, HIT, SLAPPED, OR OTHERWISE PHYSICALLY HURT BY YOUR PARTNER OR EX-PARTNER?: NO
IN A TYPICAL WEEK, HOW MANY TIMES DO YOU TALK ON THE PHONE WITH FAMILY, FRIENDS, OR NEIGHBORS?: MORE THAN THREE TIMES A WEEK
IN THE PAST 12 MONTHS, HAS THE ELECTRIC, GAS, OIL, OR WATER COMPANY THREATENED TO SHUT OFF SERVICE IN YOUR HOME?: NO
WITHIN THE LAST YEAR, HAVE YOU BEEN HUMILIATED OR EMOTIONALLY ABUSED IN OTHER WAYS BY YOUR PARTNER OR EX-PARTNER?: NO
WITHIN THE LAST YEAR, HAVE YOU BEEN AFRAID OF YOUR PARTNER OR EX-PARTNER?: NO
HOW HARD IS IT FOR YOU TO PAY FOR THE VERY BASICS LIKE FOOD, HOUSING, MEDICAL CARE, AND HEATING?: NOT HARD AT ALL
HOW OFTEN DO YOU GET TOGETHER WITH FRIENDS OR RELATIVES?: THREE TIMES A WEEK
HOW OFTEN DO YOU ATTENT MEETINGS OF THE CLUB OR ORGANIZATION YOU BELONG TO?: 1 TO 4 TIMES PER YEAR
DO YOU BELONG TO ANY CLUBS OR ORGANIZATIONS SUCH AS CHURCH GROUPS UNIONS, FRATERNAL OR ATHLETIC GROUPS, OR SCHOOL GROUPS?: YES
WITHIN THE LAST YEAR, HAVE TO BEEN RAPED OR FORCED TO HAVE ANY KIND OF SEXUAL ACTIVITY BY YOUR PARTNER OR EX-PARTNER?: NO

## 2024-07-24 ASSESSMENT — PATIENT HEALTH QUESTIONNAIRE - PHQ9
2. FEELING DOWN, DEPRESSED OR HOPELESS: NOT AT ALL
SUM OF ALL RESPONSES TO PHQ9 QUESTIONS 1 AND 2: 0
1. LITTLE INTEREST OR PLEASURE IN DOING THINGS: NOT AT ALL

## 2024-07-24 ASSESSMENT — ENCOUNTER SYMPTOMS
GASTROINTESTINAL NEGATIVE: 1
MUSCULOSKELETAL NEGATIVE: 1
CONSTITUTIONAL NEGATIVE: 1
OCCASIONAL FEELINGS OF UNSTEADINESS: 0
DEPRESSION: 0
LOSS OF SENSATION IN FEET: 0
CARDIOVASCULAR NEGATIVE: 1

## 2024-07-24 ASSESSMENT — LIFESTYLE VARIABLES
HOW OFTEN DO YOU HAVE A DRINK CONTAINING ALCOHOL: NEVER
AUDIT-C TOTAL SCORE: 0
HOW OFTEN DO YOU HAVE SIX OR MORE DRINKS ON ONE OCCASION: NEVER
HOW MANY STANDARD DRINKS CONTAINING ALCOHOL DO YOU HAVE ON A TYPICAL DAY: PATIENT DOES NOT DRINK
SKIP TO QUESTIONS 9-10: 1

## 2024-07-24 ASSESSMENT — PAIN SCALES - GENERAL: PAINLEVEL: 0-NO PAIN

## 2024-07-24 NOTE — PROGRESS NOTES
"Patient ID: Raquel Palacios is a 69 y.o. female.  Referring Physician: Jason Bell MD  [address] [facility] [address], [address]  [place] [address]  Primary Care Provider: Jeremias Rubin DO  Referral Reason: Anal cancer    Subjective:  Felt a mass in her anus last month. Colonoscopy showed anal cancer. Denies any pain at this time. Nervous about diagnosis.     Heme/Onc History:  Stage/Status:  - p/w anal mass without pain in June 2024. Colonoscopy in Jul 2024: Neg except perianal mass. Bx showed p16 and p40 positive SCC  - CT c/a/p (Jul 2024): Not reported yet    Assessment/Plan:  ? Anal cancer: New diagnosis. I reviewed CT images and did not see any LAP or mets. Exam does not reveal any inguinal LAPs. Needs chemoRT with Irina protocol - unless this can be locally and completely excised.     Will get labs today including HIV. Sent a message to Rad Onc to see if they need any other scans. Will see surgery, too. Sent Rx for capecitabine already.     I communicated with Dr. Padilla at Rad Onc => We will have a PET/CT and MR rectum done to help with radiation planning.     Review Of Systems:  Review of Systems   Constitutional: Negative.    HENT:  Negative.     Cardiovascular: Negative.    Gastrointestinal: Negative.    Genitourinary: Negative.     Musculoskeletal: Negative.        Physical Exam:  /85 (BP Location: Left arm, Patient Position: Sitting, BP Cuff Size: Adult)   Pulse 94   Temp 36.9 °C (98.4 °F) (Temporal)   Ht 1.626 m (5' 4\")   Wt 94.2 kg (207 lb 9 oz)   SpO2 97%   BMI 35.63 kg/m²   BSA: 2.06 meters squared  Performance Status: Asymptomatic  Physical Exam  Constitutional:       Appearance: Normal appearance.   HENT:      Head: Normocephalic and atraumatic.   Eyes:      Pupils: Pupils are equal, round, and reactive to light.   Cardiovascular:      Rate and Rhythm: Normal rate and regular rhythm.   Pulmonary:      Effort: Pulmonary effort is normal.   Abdominal:      General: " "Abdomen is flat.      Palpations: Abdomen is soft. There is no mass.   Musculoskeletal:      Right lower leg: No edema.      Left lower leg: No edema.   Lymphadenopathy:      Cervical: No cervical adenopathy.   Skin:     Coloration: Skin is not jaundiced.   Neurological:      General: No focal deficit present.      Mental Status: She is alert and oriented to person, place, and time.         Results:  Diagnostic Results   No results found for: \"WBC\", \"HGB\", \"HCT\", \"MCV\", \"PLT\"  Lab Results   Component Value Date    CREATININE 1.19 (H) 07/23/2024       Current Outpatient Medications:     acetaminophen (Tylenol) 325 mg capsule, Take by mouth., Disp: , Rfl:     amLODIPine (Norvasc) 10 mg tablet, Take 1 tablet (10 mg) by mouth once daily., Disp: , Rfl:     atorvastatin (Lipitor) 20 mg tablet, Take 1 tablet (20 mg) by mouth once daily., Disp: , Rfl:     azelastine (Optivar) 0.05 % ophthalmic solution, INSTILL 1 DROP INTO AFFECTED EYE(S) TWICE A DAY AS NEEDED, Disp: , Rfl:     busPIRone (Buspar) 10 mg tablet, Take 1 tablet (10 mg) by mouth once daily., Disp: , Rfl:     ergocalciferol (Vitamin D-2) 1.25 MG (18613 UT) capsule, Take 1 capsule (50,000 Units) by mouth once a week., Disp: , Rfl:     escitalopram (Lexapro) 20 mg tablet, Take 1 tablet (20 mg) by mouth once daily., Disp: , Rfl:     fexofenadine (Allegra) 180 mg tablet, Take 1 tablet (180 mg) by mouth once daily., Disp: , Rfl:     losartan (Cozaar) 100 mg tablet, Take 1 tablet (100 mg) by mouth once daily., Disp: , Rfl:     magnesium oxide (Mag-Ox) 400 mg (241.3 mg magnesium) tablet, Take 1 tablet (400 mg) by mouth early in the morning.., Disp: , Rfl:     meclizine (Antivert) 12.5 mg tablet, Take 1 tablet (12.5 mg) by mouth every 6 hours if needed., Disp: , Rfl:     omeprazole (PriLOSEC) 40 mg DR capsule, Take 1 capsule (40 mg) by mouth once daily., Disp: , Rfl:     [START ON 8/12/2024] capecitabine (Xeloda) 150 mg tablet, Take 8 tablets (1,200 mg total) by mouth 2 " times a day.  Take on days of radiation only. Take with food. Swallow whole with water. Do not crush or cut., Disp: 480 tablet, Rfl: 0    [START ON 8/12/2024] capecitabine (Xeloda) 500 mg tablet, Take 1 tablet (500 mg total) by mouth 2 times a day.  Take on days of radiation only. Take with food. Swallow whole with water. Do not crush or cut., Disp: 60 tablet, Rfl: 0    hydrocortisone (Anusol-HC) 2.5 % rectal cream, Insert into the rectum twice a day., Disp: , Rfl:     lipase-protease-amylase (Zenpep) 40,000-126,000- 168,000 unit capsule, 2 pills per meal, 1 per snack, 8 pills per day, Disp: , Rfl:     loperamide (Imodium) 2 mg capsule, Take 2 capsules (4 mg) by mouth with the first episode of diarrhea and 1 capsule (2 mg) by mouth with any additional episodes. Maximum 8 capsules (16 mg) per day., Disp: 100 capsule, Rfl: 2    ondansetron (Zofran) 8 mg tablet, Take 1 tablet (8 mg) by mouth every 8 hours if needed for nausea or vomiting., Disp: 30 tablet, Rfl: 5    prochlorperazine (Compazine) 10 mg tablet, Take 1 tablet (10 mg) by mouth every 6 hours if needed for nausea or vomiting., Disp: 30 tablet, Rfl: 5     Past Surgical History:   Procedure Laterality Date    HERNIA REPAIR       No family history on file.   reports that she has been smoking cigarettes. She has never used smokeless tobacco.  Social History     Socioeconomic History    Marital status:      Spouse name: Not on file    Number of children: Not on file    Years of education: Not on file    Highest education level: Not on file   Occupational History    Not on file   Tobacco Use    Smoking status: Every Day     Types: Cigarettes    Smokeless tobacco: Never   Vaping Use    Vaping status: Never Used   Substance and Sexual Activity    Alcohol use: Never    Drug use: Never    Sexual activity: Defer   Other Topics Concern    Not on file   Social History Narrative    Not on file     Social Determinants of Health     Financial Resource Strain: Low Risk   (7/24/2024)    Overall Financial Resource Strain (CARDIA)     Difficulty of Paying Living Expenses: Not hard at all   Food Insecurity: No Food Insecurity (7/24/2024)    Hunger Vital Sign     Worried About Running Out of Food in the Last Year: Never true     Ran Out of Food in the Last Year: Never true   Transportation Needs: No Transportation Needs (7/24/2024)    PRAPARE - Transportation     Lack of Transportation (Medical): No     Lack of Transportation (Non-Medical): No   Physical Activity: Inactive (7/24/2024)    Exercise Vital Sign     Days of Exercise per Week: 0 days     Minutes of Exercise per Session: 0 min   Stress: No Stress Concern Present (7/24/2024)    [employer] [place] [employer] - Occupational Stress Questionnaire     Feeling of Stress : Only a little   Social Connections: Moderately Integrated (7/24/2024)    Social Connection and Isolation Panel [NHANES]     Frequency of Communication with Friends and Family: More than three times a week     Frequency of Social Gatherings with Friends and Family: Three times a week     Attends Taoist Services: More than 4 times per year     Active Member of Clubs or Organizations: Yes     Attends Club or Organization Meetings: 1 to 4 times per year     Marital Status:    Intimate Partner Violence: Not At Risk (7/24/2024)    Humiliation, Afraid, Rape, and Kick questionnaire     Fear of Current or Ex-Partner: No     Emotionally Abused: No     Physically Abused: No     Sexually Abused: No   Housing Stability: Low Risk  (7/24/2024)    Housing Stability Vital Sign     Unable to Pay for Housing in the Last Year: No     Number of Times Moved in the Last Year: 1     Homeless in the Last Year: No       Diagnoses and all orders for this visit:  Squamous cell cancer, anus (Multi)  -     Referral to Hematology and Oncology  -     ondansetron (Zofran) 8 mg tablet; Take 1 tablet (8 mg) by mouth every 8 hours if needed for nausea or vomiting.  -      prochlorperazine (Compazine) 10 mg tablet; Take 1 tablet (10 mg) by mouth every 6 hours if needed for nausea or vomiting.  -     loperamide (Imodium) 2 mg capsule; Take 2 capsules (4 mg) by mouth with the first episode of diarrhea and 1 capsule (2 mg) by mouth with any additional episodes. Maximum 8 capsules (16 mg) per day.  -     Clinic Appointment Request; Future  -     Infusion Appointment Request; Future  -     CBC and Auto Differential; Future  -     Comprehensive metabolic panel; Future  -     Hepatitis B surface antigen; Future  -     Hepatitis B Core Antibody, Total; Future  -     Hepatitis B surface antibody; Future  -     Dihydropyrimidine Dehydrogenase Genotype; Future  -     Clinic Appointment Request; Future  -     Infusion Appointment Request; Future  -     CBC and Auto Differential; Future  -     Comprehensive metabolic panel; Future  -     CBC and Auto Differential; Future  -     Comprehensive Metabolic Panel; Future  -     Magnesium; Future  -     Thyroid Stimulating Hormone; Future  -     Lactate Dehydrogenase; Future  -     capecitabine (Xeloda) 150 mg tablet; Take 8 tablets (1,200 mg total) by mouth 2 times a day.  Take on days of radiation only. Take with food. Swallow whole with water. Do not crush or cut.  -     capecitabine (Xeloda) 500 mg tablet; Take 1 tablet (500 mg total) by mouth 2 times a day.  Take on days of radiation only. Take with food. Swallow whole with water. Do not crush or cut.  -     HIV 1/2 Antigen/Antibody Screen with Reflex to Confirmation; Future  Other fatigue  -     Thyroid Stimulating Hormone; Future  Other orders  -     ondansetron (Zofran) injection 8 mg  -     prochlorperazine (Compazine) tablet 10 mg  -     prochlorperazine (Compazine) injection 10 mg  -     mitoMYcin (Mutamycin) 20 mg injection in 40 mL  -     sodium chloride 0.9 % bolus 500 mL  -     dextrose 5 % in water (D5W) bolus  -     diphenhydrAMINE (BENADryl) injection 50 mg  -     methylPREDNISolone sod  succinate (SOLU-Medrol) 40 mg/mL injection 40 mg  -     famotidine PF (Pepcid) injection 20 mg  -     EPINEPHrine (Epipen) injection syringe 0.3 mg  -     albuterol 2.5 mg /3 mL (0.083 %) nebulizer solution 3 mL  -     ondansetron (Zofran) injection 8 mg  -     prochlorperazine (Compazine) tablet 10 mg  -     prochlorperazine (Compazine) injection 10 mg  -     mitoMYcin (Mutamycin) 20 mg injection in 40 mL  -     sodium chloride 0.9 % bolus 500 mL  -     dextrose 5 % in water (D5W) bolus  -     diphenhydrAMINE (BENADryl) injection 50 mg  -     methylPREDNISolone sod succinate (SOLU-Medrol) 40 mg/mL injection 40 mg  -     famotidine PF (Pepcid) injection 20 mg  -     EPINEPHrine (Epipen) injection syringe 0.3 mg  -     albuterol 2.5 mg /3 mL (0.083 %) nebulizer solution 3 mL       I spent more than 60 minutes for the patient today, including face-to-face conversation, pre-visit preparation, post-visit orders, and others.   Cristian Álvarez MD

## 2024-07-25 ENCOUNTER — SPECIALTY PHARMACY (OUTPATIENT)
Dept: PHARMACY | Facility: CLINIC | Age: 69
End: 2024-07-25

## 2024-07-25 ENCOUNTER — TELEPHONE (OUTPATIENT)
Dept: SURGERY | Facility: CLINIC | Age: 69
End: 2024-07-25
Payer: MEDICARE

## 2024-07-25 LAB
LABORATORY COMMENT REPORT: NORMAL
PATH REPORT.FINAL DX SPEC: NORMAL
PATH REPORT.GROSS SPEC: NORMAL
PATH REPORT.RELEVANT HX SPEC: NORMAL
PATH REPORT.TOTAL CANCER: NORMAL

## 2024-07-25 NOTE — TELEPHONE ENCOUNTER
----- Message from Jason Bell sent at 7/25/2024  6:56 AM EDT -----  Let her know her scan was essentially normal - did not show any cancer    Statement Selected

## 2024-08-01 PROBLEM — F17.200 TOBACCO USE DISORDER: Status: ACTIVE | Noted: 2021-10-14

## 2024-08-01 PROBLEM — K57.92 DIVERTICULITIS: Status: ACTIVE | Noted: 2019-11-08

## 2024-08-01 PROBLEM — N18.32 STAGE 3B CHRONIC KIDNEY DISEASE (MULTI): Status: ACTIVE | Noted: 2021-10-14

## 2024-08-01 PROBLEM — H10.13 ALLERGIC CONJUNCTIVITIS OF BOTH EYES: Status: ACTIVE | Noted: 2021-03-16

## 2024-08-01 PROBLEM — R11.0 NAUSEA: Status: ACTIVE | Noted: 2022-02-18

## 2024-08-01 PROBLEM — R42 DIZZINESS: Status: ACTIVE | Noted: 2022-02-18

## 2024-08-01 PROBLEM — N84.0 POLYP OF CORPUS UTERI: Status: ACTIVE | Noted: 2020-02-05

## 2024-08-01 PROBLEM — E66.811 OBESITY, CLASS I, BMI 30-34.9: Status: ACTIVE | Noted: 2019-02-27

## 2024-08-01 PROBLEM — H02.403 PTOSIS OF BOTH UPPER EYELIDS: Status: ACTIVE | Noted: 2023-02-08

## 2024-08-01 PROBLEM — E66.9 OBESITY, CLASS I, BMI 30-34.9: Status: ACTIVE | Noted: 2019-02-27

## 2024-08-01 PROBLEM — R26.89 BALANCE DISORDER: Status: ACTIVE | Noted: 2022-02-18

## 2024-08-01 PROBLEM — J45.901 EXTRINSIC ASTHMA WITH ACUTE EXACERBATION (HHS-HCC): Status: ACTIVE | Noted: 2021-03-16

## 2024-08-01 PROBLEM — R51.9 HEADACHE, UNSPECIFIED HEADACHE TYPE: Status: ACTIVE | Noted: 2022-02-18

## 2024-08-01 PROBLEM — G44.209 MUSCLE TENSION HEADACHE: Status: ACTIVE | Noted: 2021-10-14

## 2024-08-02 ENCOUNTER — APPOINTMENT (OUTPATIENT)
Dept: SURGERY | Facility: CLINIC | Age: 69
End: 2024-08-02
Payer: MEDICARE

## 2024-08-06 ENCOUNTER — APPOINTMENT (OUTPATIENT)
Dept: SURGERY | Facility: CLINIC | Age: 69
End: 2024-08-06
Payer: MEDICARE

## 2024-08-06 ENCOUNTER — APPOINTMENT (OUTPATIENT)
Dept: RADIOLOGY | Facility: HOSPITAL | Age: 69
End: 2024-08-06
Payer: MEDICARE

## 2024-08-07 ENCOUNTER — HOSPITAL ENCOUNTER (OUTPATIENT)
Dept: RADIOLOGY | Facility: HOSPITAL | Age: 69
Discharge: HOME | End: 2024-08-07
Payer: MEDICARE

## 2024-08-07 ENCOUNTER — APPOINTMENT (OUTPATIENT)
Dept: SURGERY | Facility: CLINIC | Age: 69
End: 2024-08-07
Payer: MEDICARE

## 2024-08-07 DIAGNOSIS — C21.0 SQUAMOUS CELL CANCER, ANUS (MULTI): ICD-10-CM

## 2024-08-07 DIAGNOSIS — C21.0 SQUAMOUS CELL CANCER, ANUS (MULTI): Primary | ICD-10-CM

## 2024-08-07 PROCEDURE — 78815 PET IMAGE W/CT SKULL-THIGH: CPT | Mod: PI

## 2024-08-07 PROCEDURE — A9552 F18 FDG: HCPCS | Performed by: INTERNAL MEDICINE

## 2024-08-07 PROCEDURE — 1159F MED LIST DOCD IN RCRD: CPT | Performed by: SURGERY

## 2024-08-07 PROCEDURE — 99212 OFFICE O/P EST SF 10 MIN: CPT | Performed by: SURGERY

## 2024-08-07 PROCEDURE — 1160F RVW MEDS BY RX/DR IN RCRD: CPT | Performed by: SURGERY

## 2024-08-07 PROCEDURE — 78815 PET IMAGE W/CT SKULL-THIGH: CPT | Mod: PET TUMOR INIT TX STRAT | Performed by: RADIOLOGY

## 2024-08-07 PROCEDURE — 3430000001 HC RX 343 DIAGNOSTIC RADIOPHARMACEUTICALS: Performed by: INTERNAL MEDICINE

## 2024-08-07 RX ORDER — FLUDEOXYGLUCOSE F 18 200 MCI/ML
13.3 INJECTION, SOLUTION INTRAVENOUS
Status: COMPLETED | OUTPATIENT
Start: 2024-08-07 | End: 2024-08-07

## 2024-08-07 NOTE — PATIENT INSTRUCTIONS
As discussed you have squamous of carcinoma of the anus.  You will be receiving neoadjuvant chemo and radiation therapy.  He will also see colorectal surgery.  I will follow with you as needed.

## 2024-08-07 NOTE — PROGRESS NOTES
Raquel Palacios        An interactive audio and video telecommunication system which permits real time communications between the patient (at the originating site) and provider (at the distant site) was utilized to provide this telehealth service.   Verbal consent was requested and obtained from Raquel Philip on 8/7/2024 for a telehealth visit.    Status post new diagnosis squamous cell carcinoma of the anus after endoscopy.  Seen by oncology for nigra protocol.  Due to see radiation oncology in AM.  Has a follow-up with colorectal surgery.  Most recent CT scan of chest and pelvis was negative.  PET scan was performed today.  Having no complaints feels well.    Reviewed pathology  Surgical Pathology Exam: [id number]  Order: [id number]   Collected 7/11/2024 07:40       Status: Final result       Visible to patient: Yes (seen)       Dx: Mass of anus    0 Result Notes      Component    FINAL DIAGNOSIS   A. ANUS BIOPSY:   Squamous cell carcinoma, see note     Note: Immunostain for p40 is positive and immunostain for p16 is diffusely positive (strong).        Case reviewed and diagnosis agreed upon at GI intradepartmental case consensus conference 7/22/24 (Dr. Johanny Mejia)          Problem List Items Addressed This Visit       Squamous cell cancer, anus (Multi) - Primary      Plan-will follow-up with medical oncology radiation oncology and colorectal surgery.  Follow-up with me as needed.

## 2024-08-08 ENCOUNTER — HOSPITAL ENCOUNTER (OUTPATIENT)
Dept: RADIATION ONCOLOGY | Facility: CLINIC | Age: 69
Setting detail: RADIATION/ONCOLOGY SERIES
Discharge: HOME | End: 2024-08-08
Payer: MEDICARE

## 2024-08-08 VITALS
BODY MASS INDEX: 35.84 KG/M2 | DIASTOLIC BLOOD PRESSURE: 87 MMHG | WEIGHT: 208.78 LBS | HEART RATE: 93 BPM | TEMPERATURE: 97.9 F | RESPIRATION RATE: 16 BRPM | OXYGEN SATURATION: 98 % | SYSTOLIC BLOOD PRESSURE: 157 MMHG

## 2024-08-08 DIAGNOSIS — C21.0 SQUAMOUS CELL CANCER, ANUS (MULTI): Primary | ICD-10-CM

## 2024-08-08 ASSESSMENT — PATIENT HEALTH QUESTIONNAIRE - PHQ9
SUM OF ALL RESPONSES TO PHQ9 QUESTIONS 1 AND 2: 0
1. LITTLE INTEREST OR PLEASURE IN DOING THINGS: NOT AT ALL
2. FEELING DOWN, DEPRESSED OR HOPELESS: NOT AT ALL

## 2024-08-08 ASSESSMENT — ENCOUNTER SYMPTOMS
WOUND: 0
ARTHRALGIAS: 0
OCCASIONAL FEELINGS OF UNSTEADINESS: 0
VOMITING: 0
ADENOPATHY: 0
CHILLS: 0
NUMBNESS: 0
HEADACHES: 0
DYSURIA: 0
FEVER: 0
LEG SWELLING: 0
NAUSEA: 0
SHORTNESS OF BREATH: 0
EXTREMITY WEAKNESS: 0
FREQUENCY: 0
CONSTIPATION: 0
RECTAL PAIN: 0
DEPRESSION: 0
LOSS OF SENSATION IN FEET: 0
DIARRHEA: 0
BACK PAIN: 0
UNEXPECTED WEIGHT CHANGE: 0
FATIGUE: 0

## 2024-08-08 ASSESSMENT — COLUMBIA-SUICIDE SEVERITY RATING SCALE - C-SSRS
6. HAVE YOU EVER DONE ANYTHING, STARTED TO DO ANYTHING, OR PREPARED TO DO ANYTHING TO END YOUR LIFE?: NO
1. IN THE PAST MONTH, HAVE YOU WISHED YOU WERE DEAD OR WISHED YOU COULD GO TO SLEEP AND NOT WAKE UP?: NO
2. HAVE YOU ACTUALLY HAD ANY THOUGHTS OF KILLING YOURSELF?: NO

## 2024-08-08 ASSESSMENT — PAIN SCALES - GENERAL: PAINLEVEL: 0-NO PAIN

## 2024-08-08 NOTE — PROGRESS NOTES
Radiation Oncology Nursing Note    Prior Radiotherapy:  No  No radiation treatments to show. (Treatments may have been administered in another system.)     Current Systemic Treatment:  Yes, describe: to start on 8/21/24     Presence of Pacemaker or ICD:  No    History of Autoimmune or Connective Tissue Disorders:  No    Pain: The patient's current pain level was assessed.  They report currently having a pain of 0 out of 10.  They feel their pain is under control without the use of pain medications.    Review of Systems:  Review of Systems - Oncology

## 2024-08-08 NOTE — PROGRESS NOTES
"Radiation Oncology Outpatient Consult    Patient Name:  Raquel Palacios  MRN:  [id number]  :  1955    Referring Provider: Jason Bell MD  Primary Care Provider: Jeremias Rubin DO  Care Team: Patient Care Team:  Jeremias Rubin DO as PCP - General (Family Medicine)  Cristian Álvarez MD as Medical Oncologist (Hematology and Oncology)    Date of Service: 2024     SUBJECTIVE  History of Present Illness:  Raquel Palacios \"Mary\" is a 69 y.o. female who was referred by Jason Bell MD, for a consultation to the [facility] Department of Radiation Oncology.  She is presenting for evaluation and management of anal squamous cell carcinoma.     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 Palacios is a female that noted a perianal lesion in 2020 for which had become uncomfortable and swollen.  The patient had a prior history of negative colonoscopy approximately 4 years ago.  The patient was seen by general surgery for repeat colonoscopy.  The patient had evaluation which showed a anal lesion which was biopsied revealing invasive squamous cell carcinoma, moderately differentiated.  The patient underwent colonoscopy on 2024 which showed a adenomatous appearing mass at the posterior anal region covered one quarter of the circumference biopsy was obtained, diverticulosis and medium protruding hemorrhoid was noted.  Biopsy of the anus revealed squamous cell carcinoma, p16 strongly positive.    The patient underwent staging CT chest/abdomen/pelvis with IV contrast on 2024 which showed no evidence of pulmonary nodules, 1.1 cm left hepatic lobe and 9 mm right hepatic lobe hepatic cysts, diverticulosis and no evidence of osseous metastatic disease.  The patient underwent PET/CT on 2024 which revealed focal uptake in the left parotid gland consistent with Warthin tumor, focal uptake in the anus with max SUV 17.5 and no evidence " of hypermetabolic abdominal pelvic or inguinal lymphadenopathy.      The patient has MRI of the rectum pending for 8/14/2024.  The patient has met with medical oncology and will be seeing colorectal surgery.    There are no baseline issues with rectal bleeding, changes in bowel habits or urinary function.    Prior Radiotherapy:  No radiation treatments to show. (Treatments may have been administered in another system.)       Past Medical History:    Past Medical History:   Diagnosis Date    GERD (gastroesophageal reflux disease) 5/15/2015    Hypertension         Past Surgical History:    Past Surgical History:   Procedure Laterality Date    HERNIA REPAIR          Family History:  Cancer-related family history includes Breast cancer in her mother's sister; Colon cancer in her mother; Lung cancer in her mother's brother and mother's sister; Melanoma in her mother; Prostate cancer in her father.    Social History:    Social History     Tobacco Use    Smoking status: Every Day     Types: Cigarettes    Smokeless tobacco: Never   Vaping Use    Vaping status: Never Used   Substance Use Topics    Alcohol use: Never    Drug use: Yes     Types: Marijuana       Allergies:    Allergies   Allergen Reactions    Animal Dander Itching and Shortness of breath    House Dust Mite Itching    Sulfa (Sulfonamide Antibiotics) Anaphylaxis    Cat Dander Itching    Codeine Other     Cant wake up        Medications:    Current Outpatient Medications:     acetaminophen (Tylenol) 325 mg capsule, Take by mouth., Disp: , Rfl:     amLODIPine (Norvasc) 10 mg tablet, Take 1 tablet (10 mg) by mouth once daily., Disp: , Rfl:     atorvastatin (Lipitor) 20 mg tablet, Take 1 tablet (20 mg) by mouth once daily., Disp: , Rfl:     azelastine (Optivar) 0.05 % ophthalmic solution, INSTILL 1 DROP INTO AFFECTED EYE(S) TWICE A DAY AS NEEDED, Disp: , Rfl:     busPIRone (Buspar) 10 mg tablet, Take 1 tablet (10 mg) by mouth once daily., Disp: , Rfl:     [START ON  8/12/2024] capecitabine (Xeloda) 150 mg tablet, Take 8 tablets (1,200 mg total) by mouth 2 times a day.  Take on days of radiation only. Take with food. Swallow whole with water. Do not crush or cut. Take with 500mg tablets for total dose of 1700mg., Disp: 480 tablet, Rfl: 0    [START ON 8/12/2024] capecitabine (Xeloda) 500 mg tablet, Take 1 tablet (500 mg total) by mouth 2 times a day.  Take on days of radiation only. Take with food. Swallow whole with water. Do not crush or cut. Take with 150mg tablets for total dose of 1700mg., Disp: 60 tablet, Rfl: 0    ergocalciferol (Vitamin D-2) 1.25 MG (57084 UT) capsule, Take 1 capsule (50,000 Units) by mouth once a week., Disp: , Rfl:     escitalopram (Lexapro) 20 mg tablet, Take 1 tablet (20 mg) by mouth once daily., Disp: , Rfl:     fexofenadine (Allegra) 180 mg tablet, Take 1 tablet (180 mg) by mouth once daily., Disp: , Rfl:     hydrocortisone (Anusol-HC) 2.5 % rectal cream, Insert into the rectum twice a day., Disp: , Rfl:     lipase-protease-amylase (Zenpep) 40,000-126,000- 168,000 unit capsule, 2 pills per meal, 1 per snack, 8 pills per day, Disp: , Rfl:     loperamide (Imodium) 2 mg capsule, Take 2 capsules (4 mg) by mouth with the first episode of diarrhea and 1 capsule (2 mg) by mouth with any additional episodes. Maximum 8 capsules (16 mg) per day., Disp: 100 capsule, Rfl: 2    losartan (Cozaar) 100 mg tablet, Take 1 tablet (100 mg) by mouth once daily., Disp: , Rfl:     magnesium oxide (Mag-Ox) 400 mg (241.3 mg magnesium) tablet, Take 1 tablet (400 mg) by mouth early in the morning.., Disp: , Rfl:     meclizine (Antivert) 12.5 mg tablet, Take 1 tablet (12.5 mg) by mouth every 6 hours if needed., Disp: , Rfl:     omeprazole (PriLOSEC) 40 mg DR capsule, Take 1 capsule (40 mg) by mouth once daily., Disp: , Rfl:     ondansetron (Zofran) 8 mg tablet, Take 1 tablet (8 mg) by mouth every 8 hours if needed for nausea or vomiting., Disp: 30 tablet, Rfl: 5     prochlorperazine (Compazine) 10 mg tablet, Take 1 tablet (10 mg) by mouth every 6 hours if needed for nausea or vomiting., Disp: 30 tablet, Rfl: 5      Review of Systems:  Review of Systems   Constitutional:  Negative for chills, fatigue, fever and unexpected weight change.   Respiratory:  Negative for shortness of breath.    Cardiovascular:  Negative for chest pain and leg swelling.   Gastrointestinal:  Negative for constipation, diarrhea, nausea, rectal pain (Feels the lesion, mild pains with wiping) and vomiting.   Genitourinary:  Negative for bladder incontinence, dysuria, frequency, vaginal bleeding and vaginal discharge.    Musculoskeletal:  Negative for arthralgias and back pain.   Skin:  Negative for wound.   Neurological:  Negative for extremity weakness, headaches and numbness.   Hematological:  Negative for adenopathy.       Performance Status:  The Karnofsky performance scale today is 90, Able to carry on normal activity; minor signs or symptoms of disease (ECOG equivalent 0).        OBJECTIVE  /87 (BP Location: Left arm, Patient Position: Sitting, BP Cuff Size: Adult long)   Pulse 93   Temp 36.6 °C (97.9 °F) (Temporal)   Resp 16   Wt 94.7 kg (208 lb 12.4 oz)   SpO2 98%   BMI 35.84 kg/m²    Physical Exam  Vitals and nursing note reviewed. Exam conducted with a chaperone present.   HENT:      Head: Normocephalic.   Eyes:      Extraocular Movements: Extraocular movements intact.   Cardiovascular:      Rate and Rhythm: Normal rate and regular rhythm.   Pulmonary:      Effort: Pulmonary effort is normal.   Genitourinary:     Labia:         Right: No rash or lesion.         Left: No rash or lesion.        Musculoskeletal:         General: Normal range of motion.      Right lower leg: No edema.      Left lower leg: No edema.   Neurological:      Mental Status: She is alert.        Laboratory Review:  There are no laboratory contraindications to radiation therapy.    The pertinent lab results were  reviewed and discussed with the patient.  Notably,     7/24/2024-magnesium: 2.03  7/24/2024-CMP: Creatinine 1.21 (H), GFR 49 (L)  7/24/2024-CBC with differential: Within normal limits    Pathology Review:  The pertinent pathology results were reviewed and discussed with the patient.  Notably,     7/11/2024-A. ANUS BIOPSY: Squamous cell carcinoma, Note: Immunostain for p40 is positive and immunostain for p16 is diffusely positive (strong).     7/10/2024-A.  Anus, biopsy: -Invasive squamous cell carcinoma, moderately differentiated.     2/5/2024-Pap smear: Negative for intraepithelial lesion or malignancy.  Negative for HPV testing.    Disease Associated Genomics:  P16 positive    Disease Tumor Markers:   None    Imaging:  The pertinent imaging results were reviewed and discussed with the patient.  Notably,     8/14/2024-MRI rectum: Pending    8/7/2024-PET/CT: No evidence of focal hypermetabolic cervical lymphadenopathy.  Focal uptake in the left parotid gland with max SUV 4.5 consistent with Warthin tumor.  No evidence of focal hypermetabolic lesions of the lung, mediastinum, hilar or axilla.  There is focal uptake in the anus with max SUV 17.5.  No evidence of hypermetabolic lymphadenopathy.  There is no evidence of hypermetabolic osseous metastatic disease.    7/23/2024-CT chest/abdomen/pelvis with IV contrast: No evidence of pulmonary nodules, prominent but nonenlarged mediastinal lymph nodes including right lower paratracheal lymph node.  A 1.1 cm lesion in the left hepatic lobe and a 9 mm lesion within the right hepatic lobe likely hepatic cyst.  Diverticulosis without evidence of diverticulitis.  Anal lesion is not well-visualized.  Status post appendectomy.  No evidence of suspicious osseous metastatic disease.       Prior Systemic Therapies:  None    Prior Surgeries:  7/11/2024-colonoscopy: Single adenomatous appearing mass at the posterior anal region covering one quarter of the circumference, biopsy was  obtained.  Medium protruding hemorrhoid, diverticulosis of the ascending colon, hepatic flexure, transverse colon, descending colon and sigmoid colon.    Prior Radiation Therapy:   None    ASSESSMENT:   Raquel Palacios is a 69 y.o. female with Squamous cell cancer, anus (Multi), Clinical: Stage I (cT1, cN0, cM0).          Ms. 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    I discussed with the patient regarding her clinical presentation, pathology, imaging and additional workup required for final recommendation and planning for what seems to be a early stage squamous cell carcinoma of the anus.     I discussed with the patient regarding her need for MRI of the rectum in order to further tissue delineate the tumor involvement to the surrounding anal sphincter complex.  By clinical examination, the patient has a 1-2 cm exophytic mass predominantly involving the left external anal skin with superior involvement abutting but does not feel fixed to the anal sphincter complex.  Therefore, I discussed with the patient the recommendation of obtaining MRI of the rectum and seeing colorectal surgery before finalizing the definitive plan as treatment options for small early stage squamous cell carcinomas of the anus may include surgical resection if clear margins are obtainable without morbidity of required resection of the anal sphincter complex versus treatment with chemoradiation therapy.    I discussed with the patient regarding the process of chemoradiation therapy of likely 5-1/2 weeks.  I discussed with the patient regarding the benefits including local regional control, and sphincter preservation rates.  I discussed potential salvage options including surgical resection at time of recurrence.  I discussed with the patient regarding the acute side effects of radiation therapy including but not limited to fatigue, dermatitis, cystitis, urinary frequency, changes to  bowel function, diarrhea and impact on blood counts.  I discussed with the patient regarding the long-term effects of radiation therapy including but not limited to chronic changes to bowel and bladder function including rare risk of fecal incontinence, long-term effects to the vagina including vaginal stenosis and vaginal dryness.    The patient is tentatively scheduled for chemotherapy, the patient had all questions and concerns addressed during the visit.  Consent was obtained and the patient will be scheduled tentatively for CT simulation.     Based on results of the MRI rectum, the final recommended treatment length could range from 5040 cGy - 5400 cGy in 28-30 fractions, given current imaging and clinical findings would likely be 5040 cGy in 28 fractions.      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  -If unresectable without risk of APR, plan for 5040 cGy - 5400 cGy in 28-30 fractions depending on final T-stage and N-stage    NCCN Guidelines were applicable to guide this patients treatment plan.     A total of 50 minutes were spent face-to-face with the patient, the majority of time spent detailing treatment options with an additional 10 minutes spent reviewing records including imaging, pathology and physician notes.    Jorge Padilla MD  [facility] [place]  [facility]  - Department of Radiation Oncology  Phone: [phone]  Fax: [phone]  [employer] secure chat preferred / Pager [phone]    Note: This was transcribed using Dragon voice recognition software. Attempts were made to correct any errors; however, errors or omissions may be present.

## 2024-08-08 NOTE — PROGRESS NOTES
Patient is in today for consult visit. She denies pain, changes in urination, N/V, fevers, and fatigue. She states she has daily bowel movements of loose consistency. The patient also reported that occasionally there is s amll amount of blood on toilet paper when she has bowel movements. She is a current smoker of 0.5-1 pack of cigarettes per day and smokes marijuana in the evenings. She will see [facility] 8/12. Prior to appointment ending the patient was given written and verbal education on radiation therapy, side effects, and how to contact this office. She was also given appointment card for CT sim. CT sim. Pt was educated on what to expect for CT sim, what to expect when xRT starts, OTV schedule, side effects, oriented to waiting area, changing rooms, and lockers. All questions answered. Verbalized understanding using teach back. No further concerns at this time.

## 2024-08-09 ENCOUNTER — SPECIALTY PHARMACY (OUTPATIENT)
Dept: PHARMACY | Facility: CLINIC | Age: 69
End: 2024-08-09

## 2024-08-09 DIAGNOSIS — C21.0 SQUAMOUS CELL CANCER, ANUS (MULTI): Primary | ICD-10-CM

## 2024-08-09 PROCEDURE — RXMED WILLOW AMBULATORY MEDICATION CHARGE

## 2024-08-09 RX ORDER — LORAZEPAM 2 MG/1
2 TABLET ORAL EVERY 6 HOURS PRN
Qty: 1 TABLET | Refills: 0 | Status: SHIPPED | OUTPATIENT
Start: 2024-08-09 | End: 2024-08-10

## 2024-08-09 NOTE — PROGRESS NOTES
Specialty Pharmacy-No PA Needed    Capecitabine 500mg    The patient's prescription does not require a prior auth. at this time.     This patient has scheduled their medication delivery with  Specialty Pharmacy.      They should receive their medication Monday, 08.12.24.    Thank you,    Lurdes Zuluaga [facility]  Pharmacy Support Liaison - [facility]  Specialty Pharmacy  [address] [place] [address], [id number]  T: [phone]  F: [phone]  [email or web address]

## 2024-08-12 ENCOUNTER — OFFICE VISIT (OUTPATIENT)
Dept: SURGERY | Facility: CLINIC | Age: 69
End: 2024-08-12
Payer: MEDICARE

## 2024-08-12 VITALS
DIASTOLIC BLOOD PRESSURE: 80 MMHG | BODY MASS INDEX: 33.43 KG/M2 | WEIGHT: 208 LBS | OXYGEN SATURATION: 98 % | SYSTOLIC BLOOD PRESSURE: 120 MMHG | HEART RATE: 86 BPM | HEIGHT: 66 IN | TEMPERATURE: 98.2 F

## 2024-08-12 DIAGNOSIS — C21.0 SQUAMOUS CELL CANCER, ANUS (MULTI): Primary | ICD-10-CM

## 2024-08-12 PROCEDURE — 1159F MED LIST DOCD IN RCRD: CPT | Performed by: STUDENT IN AN ORGANIZED HEALTH CARE EDUCATION/TRAINING PROGRAM

## 2024-08-12 PROCEDURE — 1126F AMNT PAIN NOTED NONE PRSNT: CPT | Performed by: STUDENT IN AN ORGANIZED HEALTH CARE EDUCATION/TRAINING PROGRAM

## 2024-08-12 PROCEDURE — 3079F DIAST BP 80-89 MM HG: CPT | Performed by: STUDENT IN AN ORGANIZED HEALTH CARE EDUCATION/TRAINING PROGRAM

## 2024-08-12 PROCEDURE — 3074F SYST BP LT 130 MM HG: CPT | Performed by: STUDENT IN AN ORGANIZED HEALTH CARE EDUCATION/TRAINING PROGRAM

## 2024-08-12 PROCEDURE — 46600 DIAGNOSTIC ANOSCOPY SPX: CPT | Performed by: STUDENT IN AN ORGANIZED HEALTH CARE EDUCATION/TRAINING PROGRAM

## 2024-08-12 PROCEDURE — 3008F BODY MASS INDEX DOCD: CPT | Performed by: STUDENT IN AN ORGANIZED HEALTH CARE EDUCATION/TRAINING PROGRAM

## 2024-08-12 PROCEDURE — 99204 OFFICE O/P NEW MOD 45 MIN: CPT | Performed by: STUDENT IN AN ORGANIZED HEALTH CARE EDUCATION/TRAINING PROGRAM

## 2024-08-12 PROCEDURE — 99214 OFFICE O/P EST MOD 30 MIN: CPT | Performed by: STUDENT IN AN ORGANIZED HEALTH CARE EDUCATION/TRAINING PROGRAM

## 2024-08-12 ASSESSMENT — ENCOUNTER SYMPTOMS
FACIAL ASYMMETRY: 0
WOUND: 0
UNEXPECTED WEIGHT CHANGE: 0
ARTHRALGIAS: 0
NAUSEA: 0
TROUBLE SWALLOWING: 0
BLOOD IN STOOL: 0
HEMATURIA: 0
SPEECH DIFFICULTY: 0
FEVER: 0
ABDOMINAL PAIN: 0
PALPITATIONS: 0
VOMITING: 0
CHEST TIGHTNESS: 0
HEADACHES: 0
DIARRHEA: 0
VOICE CHANGE: 0
BRUISES/BLEEDS EASILY: 0
ADENOPATHY: 0
DYSURIA: 0
SORE THROAT: 0
CHILLS: 0
SHORTNESS OF BREATH: 0

## 2024-08-12 ASSESSMENT — PAIN SCALES - GENERAL: PAINLEVEL: 0-NO PAIN

## 2024-08-12 ASSESSMENT — PATIENT HEALTH QUESTIONNAIRE - PHQ9
2. FEELING DOWN, DEPRESSED OR HOPELESS: NOT AT ALL
1. LITTLE INTEREST OR PLEASURE IN DOING THINGS: NOT AT ALL
SUM OF ALL RESPONSES TO PHQ9 QUESTIONS 1 AND 2: 0

## 2024-08-12 NOTE — PROGRESS NOTES
History Of Present Illness  Mary Palacios is a 69 y.o. female presenting with newly diagnosed anal squamous cell carcinoma.  She was seen by Dr. Bell for evaluation of a hemorrhoid that she noticed for a few weeks at that point, but was noted to have a mass which he biopsied and it did come back as an SCC.  She has already seen medical and radiation oncology.  A PET scan has been performed as well as a CT of the chest abdomen pelvis which did not show any evidence of metastatic disease. an MRI of the rectum is pending. Dr Bell also did a colonoscopy, the anal mass was seen, no other polyps or abnormality.  Her last colonoscopy before this was 2019 and had a small polyp removed.  She notices occasional blood with wiping.  Occasional discomfort when sitting.  She did have a Pap performed earlier this year, which was negative for HPV       Past Medical History  She has a past medical history of GERD (gastroesophageal reflux disease) (5/15/2015) and Hypertension.    Surgical History  She has a past surgical history that includes Hernia repair.     Social History  She reports that she has been smoking cigarettes. She has never used smokeless tobacco. She reports current drug use. Drug: Marijuana. She reports that she does not drink alcohol.    Family History  Family History   Problem Relation Name Age of Onset    Colon cancer Mother      Melanoma Mother      Prostate cancer Father      Breast cancer Mother's Sister      Lung cancer Mother's Sister      Lung cancer Mother's Brother          Allergies  Animal dander, House dust mite, Sulfa (sulfonamide antibiotics), Cat dander, and Codeine    Review of Systems   Constitutional:  Negative for chills, fever and unexpected weight change.   HENT:  Negative for sneezing, sore throat, trouble swallowing and voice change.    Respiratory:  Negative for chest tightness and shortness of breath.    Cardiovascular:  Negative for chest pain and palpitations.   Gastrointestinal:   "Negative for abdominal pain, blood in stool, diarrhea, nausea and vomiting.   Endocrine: Negative for cold intolerance and heat intolerance.   Genitourinary:  Negative for decreased urine volume, dysuria and hematuria.   Musculoskeletal:  Negative for arthralgias and gait problem.   Skin:  Negative for rash and wound.   Neurological:  Negative for facial asymmetry, speech difficulty and headaches.   Hematological:  Negative for adenopathy. Does not bruise/bleed easily.   Psychiatric/Behavioral:  Negative for self-injury and suicidal ideas.         Physical Exam  Vitals and nursing note reviewed.   Constitutional:       Appearance: Normal appearance.   HENT:      Head: Normocephalic and atraumatic.      Mouth/Throat:      Mouth: Mucous membranes are moist.      Pharynx: Oropharynx is clear.   Eyes:      Extraocular Movements: Extraocular movements intact.      Pupils: Pupils are equal, round, and reactive to light.   Cardiovascular:      Rate and Rhythm: Normal rate and regular rhythm.      Pulses: Normal pulses.   Pulmonary:      Effort: Pulmonary effort is normal.      Breath sounds: Normal breath sounds.   Abdominal:      General: There is no distension.      Palpations: Abdomen is soft.      Tenderness: There is no abdominal tenderness.   Genitourinary:      Musculoskeletal:      Cervical back: Normal range of motion and neck supple.   Lymphadenopathy:      Lower Body: No right inguinal adenopathy. No left inguinal adenopathy.   Skin:     General: Skin is warm and dry.   Neurological:      General: No focal deficit present.      Mental Status: She is alert and oriented to person, place, and time.   Psychiatric:         Mood and Affect: Mood normal.         Behavior: Behavior normal.          Last Recorded Vitals  Blood pressure 120/80, pulse 86, temperature 36.8 °C (98.2 °F), height 1.676 m (5' 6\"), weight 94.3 kg (208 lb), SpO2 98%.    Relevant Results  Reviewed notes from Dr. Bell, recent imaging including CT " "chest abdomen pelvis and PET scan     Assessment/Plan   Problem List Items Addressed This Visit             ICD-10-CM       Hematology and Neoplasia    Squamous cell cancer, anus (Multi) C21.0     69-year-old female with anal mass which is biopsy-proven anus squamous cell carcinoma.  There is no evidence of metastatic disease.  Given its size, it is not amenable to resection with adequate margins and also sphincter preservation therefore I recommended that we proceed with chemoradiation/alysha protocol.  Since she is already met with medical oncology and radiation oncology, she understands the plan moving forward.  She has her MRI scheduled for Wednesday, and also her radiation planning.  It was determined that she will not need a port but we discussed if anything should change, she can contact me for placement.  Will follow-up with me to assess treatment response.  She is here today with her daughter and granddaughter, all questions answered.      Luz Zhao MD  Patient ID: Raquel Palacios \"Lupe" is a 69 y.o. female.    Anoscopy    Date/Time: 8/12/2024 2:15 PM    Performed by: Luz Zhao MD  Authorized by: Luz Zhao MD    Consent:     Consent obtained:  Verbal and written    Consent given by:  Patient    Risks, benefits, and alternatives were discussed: yes      Risks discussed:  Bleeding and pain    Alternatives discussed:  No treatment and alternative treatment  Universal protocol:     Procedure explained and questions answered to patient or proxy's satisfaction: yes      Immediately prior to procedure, a time out was called: yes      Patient identity confirmed:  Verbally with patient  Indications:     Indications: mass    Post-procedure details:     Procedure completion:  Tolerated well, no immediate complications  Comments:      Left posterior lateral soft mass at the anal verge, ~3cm    "

## 2024-08-13 ENCOUNTER — PHARMACY VISIT (OUTPATIENT)
Dept: PHARMACY | Facility: CLINIC | Age: 69
End: 2024-08-13
Payer: COMMERCIAL

## 2024-08-14 ENCOUNTER — TELEPHONE (OUTPATIENT)
Dept: RADIATION ONCOLOGY | Facility: CLINIC | Age: 69
End: 2024-08-14
Payer: MEDICARE

## 2024-08-14 ENCOUNTER — HOSPITAL ENCOUNTER (OUTPATIENT)
Dept: RADIOLOGY | Facility: EXTERNAL LOCATION | Age: 69
Discharge: HOME | End: 2024-08-14

## 2024-08-14 ENCOUNTER — APPOINTMENT (OUTPATIENT)
Dept: RADIATION ONCOLOGY | Facility: CLINIC | Age: 69
End: 2024-08-14
Payer: MEDICARE

## 2024-08-14 ENCOUNTER — SPECIALTY PHARMACY (OUTPATIENT)
Dept: PHARMACY | Facility: CLINIC | Age: 69
End: 2024-08-14

## 2024-08-14 ENCOUNTER — HOSPITAL ENCOUNTER (OUTPATIENT)
Dept: RADIOLOGY | Facility: HOSPITAL | Age: 69
Discharge: HOME | End: 2024-08-14
Payer: MEDICARE

## 2024-08-14 DIAGNOSIS — C21.0 SQUAMOUS CELL CANCER, ANUS (MULTI): ICD-10-CM

## 2024-08-14 PROCEDURE — 2500000004 HC RX 250 GENERAL PHARMACY W/ HCPCS (ALT 636 FOR OP/ED): Mod: JZ | Performed by: INTERNAL MEDICINE

## 2024-08-14 PROCEDURE — 72197 MRI PELVIS W/O & W/DYE: CPT | Performed by: RADIOLOGY

## 2024-08-14 PROCEDURE — A9575 INJ GADOTERATE MEGLUMI 0.1ML: HCPCS | Performed by: INTERNAL MEDICINE

## 2024-08-14 PROCEDURE — 96372 THER/PROPH/DIAG INJ SC/IM: CPT | Performed by: INTERNAL MEDICINE

## 2024-08-14 PROCEDURE — 72197 MRI PELVIS W/O & W/DYE: CPT

## 2024-08-14 PROCEDURE — 2550000001 HC RX 255 CONTRASTS: Performed by: INTERNAL MEDICINE

## 2024-08-14 RX ORDER — GADOTERATE MEGLUMINE 376.9 MG/ML
19 INJECTION INTRAVENOUS
Status: COMPLETED | OUTPATIENT
Start: 2024-08-14 | End: 2024-08-14

## 2024-08-14 NOTE — TELEPHONE ENCOUNTER
Telephone call completed by this RN for reminder call prior to CT/Sim. Call completed for simulation prep including full bladder, empty rectum and arriving early for IV.

## 2024-08-14 NOTE — PROGRESS NOTES
"[facility] Clinical Note    Raquel Palacios \"Mary\" is a 69 y.o. female, who is on the specialty pharmacy service for management of:  Oncology Core.    Raquel Palacios \"Mary\" is taking: capecitabine.    Medication Receipt Date: 8/14/24  Medication Start Date (planned or actual): Confirming with prescriber - starting with RT    Raquel was contacted on 8/14/2024 at 1:00 PM for a virtual pharmacy visit with encounter number [id number] from:   [facility]  [address] [place] [id number]  Dept: [phone]  Dept Fax: [phone]    Raquel was offered a Telemedicine Video visit or Telephone visit.  Raquel consented to a Telephone visit, which was performed.    The most recent encounter visit with the referring prescriber Dr. Cristian Álvarez on 7/24/24 was reviewed.  Pharmacy will continue to collaborate in the care of this patient with the referring prescriber Dr. Cristian Álvarez.    General Assessment      Impression/Plan  IMPRESSION/PLAN:  Is patient high risk (potential patients:  pregnancy, geriatric, pediatric)?  No  Is laboratory follow-up needed? No  Is a clinical intervention needed? No  Next reassessment date? ~8/28/24 then every 3 months  Additional comments:     Refer to the encounter summary report for documentation details about patient counseling and education.      Medication Adherence    The importance of adherence was discussed with the patient and they were advised to take the medication as prescribed by their provider. Patient was encouraged to call their physician's office if they have a question regarding a missed dose.     QOL/Patient Satisfaction  Rate your quality of life on scale of 1-10: -- (deferred)  Rate your satisfaction with  Specialty Pharmacy on scale of 1-10: -- (deferred)      Patient was advised to contact the pharmacy if there are any changes to their medication list, including prescriptions, OTC medications, " herbal products, or supplements. Patient was advised of [facility]'s dispensing process, refill timeline, contact information ([phone]), and patient management follow up. Patient confirmed understanding of education conducted during assessment. All patient questions and concerns were addressed to the best of my ability. Patient was encouraged to contact the specialty pharmacy with any questions or concerns.    Confirmed follow-up outreaches are properly scheduled and reviewed goals of therapy with the patient.        Michel Bowens, PharmD

## 2024-08-15 ENCOUNTER — HOSPITAL ENCOUNTER (OUTPATIENT)
Dept: RADIATION ONCOLOGY | Facility: CLINIC | Age: 69
Setting detail: RADIATION/ONCOLOGY SERIES
Discharge: HOME | End: 2024-08-15
Payer: MEDICARE

## 2024-08-15 VITALS
WEIGHT: 200.46 LBS | BODY MASS INDEX: 32.36 KG/M2 | HEART RATE: 102 BPM | TEMPERATURE: 98.1 F | SYSTOLIC BLOOD PRESSURE: 150 MMHG | DIASTOLIC BLOOD PRESSURE: 84 MMHG | RESPIRATION RATE: 16 BRPM | OXYGEN SATURATION: 97 %

## 2024-08-15 PROCEDURE — 77334 RADIATION TREATMENT AID(S): CPT | Performed by: STUDENT IN AN ORGANIZED HEALTH CARE EDUCATION/TRAINING PROGRAM

## 2024-08-15 PROCEDURE — 77470 SPECIAL RADIATION TREATMENT: CPT | Performed by: STUDENT IN AN ORGANIZED HEALTH CARE EDUCATION/TRAINING PROGRAM

## 2024-08-15 ASSESSMENT — COLUMBIA-SUICIDE SEVERITY RATING SCALE - C-SSRS
1. IN THE PAST MONTH, HAVE YOU WISHED YOU WERE DEAD OR WISHED YOU COULD GO TO SLEEP AND NOT WAKE UP?: NO
6. HAVE YOU EVER DONE ANYTHING, STARTED TO DO ANYTHING, OR PREPARED TO DO ANYTHING TO END YOUR LIFE?: NO
2. HAVE YOU ACTUALLY HAD ANY THOUGHTS OF KILLING YOURSELF?: NO

## 2024-08-15 ASSESSMENT — ENCOUNTER SYMPTOMS
DEPRESSION: 0
OCCASIONAL FEELINGS OF UNSTEADINESS: 0
LOSS OF SENSATION IN FEET: 0

## 2024-08-15 ASSESSMENT — PAIN SCALES - GENERAL: PAINLEVEL: 0-NO PAIN

## 2024-08-15 NOTE — PROGRESS NOTES
Patient seen for CT/Simulation appointment, vitals taken, allergies and screenings reviewed. Plan for today and treatment going forward reviewed, questions answered. IV placed. Patient taken to changing area, RT notified.

## 2024-08-16 ENCOUNTER — HOSPITAL ENCOUNTER (OUTPATIENT)
Dept: RADIATION ONCOLOGY | Facility: CLINIC | Age: 69
Setting detail: RADIATION/ONCOLOGY SERIES
Discharge: HOME | End: 2024-08-16
Payer: MEDICARE

## 2024-08-16 ENCOUNTER — SOCIAL WORK (OUTPATIENT)
Dept: CASE MANAGEMENT | Facility: HOSPITAL | Age: 69
End: 2024-08-16
Payer: MEDICARE

## 2024-08-16 PROCEDURE — 77300 RADIATION THERAPY DOSE PLAN: CPT | Performed by: STUDENT IN AN ORGANIZED HEALTH CARE EDUCATION/TRAINING PROGRAM

## 2024-08-16 PROCEDURE — 77301 RADIOTHERAPY DOSE PLAN IMRT: CPT | Performed by: STUDENT IN AN ORGANIZED HEALTH CARE EDUCATION/TRAINING PROGRAM

## 2024-08-16 PROCEDURE — 77338 DESIGN MLC DEVICE FOR IMRT: CPT | Performed by: STUDENT IN AN ORGANIZED HEALTH CARE EDUCATION/TRAINING PROGRAM

## 2024-08-16 NOTE — PROGRESS NOTES
ALBERTO met with patient while she was here for a consultation. Pt reports that she has support thru her daughter and some friends. She live alone in [place]. She is fully independent. She has no needs or concerns at this time. SW provided a business card and will remain available for future needs.

## 2024-08-19 ENCOUNTER — LAB (OUTPATIENT)
Dept: LAB | Facility: LAB | Age: 69
End: 2024-08-19
Payer: MEDICARE

## 2024-08-19 DIAGNOSIS — C21.0 SQUAMOUS CELL CANCER, ANUS (MULTI): ICD-10-CM

## 2024-08-19 LAB
ALBUMIN SERPL BCP-MCNC: 4.1 G/DL (ref 3.4–5)
ALP SERPL-CCNC: 109 U/L (ref 33–136)
ALT SERPL W P-5'-P-CCNC: 22 U/L (ref 7–45)
ANION GAP SERPL CALC-SCNC: 12 MMOL/L (ref 10–20)
AST SERPL W P-5'-P-CCNC: 17 U/L (ref 9–39)
BASOPHILS # BLD AUTO: 0.04 X10*3/UL (ref 0–0.1)
BASOPHILS NFR BLD AUTO: 0.5 %
BILIRUB SERPL-MCNC: 0.3 MG/DL (ref 0–1.2)
BUN SERPL-MCNC: 9 MG/DL (ref 6–23)
CALCIUM SERPL-MCNC: 9.3 MG/DL (ref 8.6–10.3)
CHLORIDE SERPL-SCNC: 103 MMOL/L (ref 98–107)
CO2 SERPL-SCNC: 28 MMOL/L (ref 21–32)
CREAT SERPL-MCNC: 0.9 MG/DL (ref 0.5–1.05)
EGFRCR SERPLBLD CKD-EPI 2021: 70 ML/MIN/1.73M*2
EOSINOPHIL # BLD AUTO: 0.11 X10*3/UL (ref 0–0.7)
EOSINOPHIL NFR BLD AUTO: 1.3 %
ERYTHROCYTE [DISTWIDTH] IN BLOOD BY AUTOMATED COUNT: 14.6 % (ref 11.5–14.5)
GLUCOSE SERPL-MCNC: 136 MG/DL (ref 74–99)
HBV CORE AB SER QL: NONREACTIVE
HBV SURFACE AB SER-ACNC: <3.1 MIU/ML
HBV SURFACE AG SERPL QL IA: NONREACTIVE
HCT VFR BLD AUTO: 41.6 % (ref 36–46)
HGB BLD-MCNC: 13.3 G/DL (ref 12–16)
HIV 1+2 AB+HIV1 P24 AG SERPL QL IA: NONREACTIVE
IMM GRANULOCYTES # BLD AUTO: 0.04 X10*3/UL (ref 0–0.7)
IMM GRANULOCYTES NFR BLD AUTO: 0.5 % (ref 0–0.9)
LYMPHOCYTES # BLD AUTO: 1.87 X10*3/UL (ref 1.2–4.8)
LYMPHOCYTES NFR BLD AUTO: 22.3 %
MCH RBC QN AUTO: 29 PG (ref 26–34)
MCHC RBC AUTO-ENTMCNC: 32 G/DL (ref 32–36)
MCV RBC AUTO: 91 FL (ref 80–100)
MONOCYTES # BLD AUTO: 0.48 X10*3/UL (ref 0.1–1)
MONOCYTES NFR BLD AUTO: 5.7 %
NEUTROPHILS # BLD AUTO: 5.84 X10*3/UL (ref 1.2–7.7)
NEUTROPHILS NFR BLD AUTO: 69.7 %
NRBC BLD-RTO: 0 /100 WBCS (ref 0–0)
PLATELET # BLD AUTO: 370 X10*3/UL (ref 150–450)
POTASSIUM SERPL-SCNC: 3.8 MMOL/L (ref 3.5–5.3)
PROT SERPL-MCNC: 7 G/DL (ref 6.4–8.2)
RBC # BLD AUTO: 4.59 X10*6/UL (ref 4–5.2)
SODIUM SERPL-SCNC: 139 MMOL/L (ref 136–145)
WBC # BLD AUTO: 8.4 X10*3/UL (ref 4.4–11.3)

## 2024-08-19 PROCEDURE — 87340 HEPATITIS B SURFACE AG IA: CPT

## 2024-08-19 PROCEDURE — 81232 DPYD GENE COMMON VARIANTS: CPT

## 2024-08-19 PROCEDURE — 36415 COLL VENOUS BLD VENIPUNCTURE: CPT

## 2024-08-19 PROCEDURE — 86706 HEP B SURFACE ANTIBODY: CPT

## 2024-08-19 PROCEDURE — 87389 HIV-1 AG W/HIV-1&-2 AB AG IA: CPT

## 2024-08-19 PROCEDURE — 86704 HEP B CORE ANTIBODY TOTAL: CPT

## 2024-08-19 PROCEDURE — G0452 MOLECULAR PATHOLOGY INTERPR: HCPCS | Performed by: INTERNAL MEDICINE

## 2024-08-20 LAB
DPYD GENE MUT ANL BLD/T: NORMAL
ELECTRONICALLY SIGNED BY: NORMAL

## 2024-08-21 ENCOUNTER — INFUSION (OUTPATIENT)
Dept: HEMATOLOGY/ONCOLOGY | Facility: HOSPITAL | Age: 69
End: 2024-08-21
Payer: MEDICARE

## 2024-08-21 ENCOUNTER — APPOINTMENT (OUTPATIENT)
Dept: HEMATOLOGY/ONCOLOGY | Facility: HOSPITAL | Age: 69
End: 2024-08-21
Payer: MEDICARE

## 2024-08-21 ENCOUNTER — OFFICE VISIT (OUTPATIENT)
Dept: HEMATOLOGY/ONCOLOGY | Facility: HOSPITAL | Age: 69
End: 2024-08-21
Payer: MEDICARE

## 2024-08-21 ENCOUNTER — RADIATION ONCOLOGY OTV (OUTPATIENT)
Dept: RADIATION ONCOLOGY | Facility: CLINIC | Age: 69
End: 2024-08-21

## 2024-08-21 ENCOUNTER — HOSPITAL ENCOUNTER (OUTPATIENT)
Dept: RADIATION ONCOLOGY | Facility: CLINIC | Age: 69
Setting detail: RADIATION/ONCOLOGY SERIES
Discharge: HOME | End: 2024-08-21
Payer: MEDICARE

## 2024-08-21 VITALS
TEMPERATURE: 97.7 F | OXYGEN SATURATION: 98 % | HEART RATE: 89 BPM | RESPIRATION RATE: 16 BRPM | SYSTOLIC BLOOD PRESSURE: 155 MMHG | DIASTOLIC BLOOD PRESSURE: 86 MMHG

## 2024-08-21 VITALS
TEMPERATURE: 98.1 F | HEIGHT: 66 IN | OXYGEN SATURATION: 98 % | BODY MASS INDEX: 33.84 KG/M2 | SYSTOLIC BLOOD PRESSURE: 136 MMHG | RESPIRATION RATE: 16 BRPM | DIASTOLIC BLOOD PRESSURE: 84 MMHG | WEIGHT: 210.54 LBS | HEART RATE: 87 BPM

## 2024-08-21 VITALS
WEIGHT: 210.32 LBS | OXYGEN SATURATION: 93 % | DIASTOLIC BLOOD PRESSURE: 82 MMHG | RESPIRATION RATE: 16 BRPM | TEMPERATURE: 97.9 F | BODY MASS INDEX: 33.95 KG/M2 | SYSTOLIC BLOOD PRESSURE: 161 MMHG | HEART RATE: 102 BPM

## 2024-08-21 DIAGNOSIS — Z51.0 ENCOUNTER FOR ANTINEOPLASTIC RADIATION THERAPY: ICD-10-CM

## 2024-08-21 DIAGNOSIS — C21.0 MALIGNANT NEOPLASM OF ANUS, UNSPECIFIED (MULTI): ICD-10-CM

## 2024-08-21 DIAGNOSIS — C21.0 SQUAMOUS CELL CANCER, ANUS (MULTI): Primary | ICD-10-CM

## 2024-08-21 DIAGNOSIS — C21.0 SQUAMOUS CELL CANCER, ANUS (MULTI): ICD-10-CM

## 2024-08-21 LAB
RAD ONC MSQ ACTUAL FRACTIONS DELIVERED: 1
RAD ONC MSQ ACTUAL SESSION DELIVERED DOSE: 180 CGRAY
RAD ONC MSQ ACTUAL TOTAL DOSE: 180 CGRAY
RAD ONC MSQ ELAPSED DAYS: 0
RAD ONC MSQ LAST DATE: NORMAL
RAD ONC MSQ PRESCRIBED FRACTIONAL DOSE: 180 CGRAY
RAD ONC MSQ PRESCRIBED NUMBER OF FRACTIONS: 28
RAD ONC MSQ PRESCRIBED TECHNIQUE: NORMAL
RAD ONC MSQ PRESCRIBED TOTAL DOSE: 5040 CGRAY
RAD ONC MSQ PRESCRIPTION PATTERN COMMENT: NORMAL
RAD ONC MSQ START DATE: NORMAL
RAD ONC MSQ TREATMENT COURSE NUMBER: 1
RAD ONC MSQ TREATMENT SITE: NORMAL

## 2024-08-21 PROCEDURE — 3008F BODY MASS INDEX DOCD: CPT | Performed by: INTERNAL MEDICINE

## 2024-08-21 PROCEDURE — 2500000004 HC RX 250 GENERAL PHARMACY W/ HCPCS (ALT 636 FOR OP/ED): Performed by: INTERNAL MEDICINE

## 2024-08-21 PROCEDURE — 99214 OFFICE O/P EST MOD 30 MIN: CPT | Performed by: INTERNAL MEDICINE

## 2024-08-21 PROCEDURE — 3075F SYST BP GE 130 - 139MM HG: CPT | Performed by: INTERNAL MEDICINE

## 2024-08-21 PROCEDURE — 99214 OFFICE O/P EST MOD 30 MIN: CPT | Mod: 25 | Performed by: INTERNAL MEDICINE

## 2024-08-21 PROCEDURE — 1159F MED LIST DOCD IN RCRD: CPT | Performed by: INTERNAL MEDICINE

## 2024-08-21 PROCEDURE — 3079F DIAST BP 80-89 MM HG: CPT | Performed by: INTERNAL MEDICINE

## 2024-08-21 PROCEDURE — 2500000004 HC RX 250 GENERAL PHARMACY W/ HCPCS (ALT 636 FOR OP/ED): Mod: JZ,TB | Performed by: INTERNAL MEDICINE

## 2024-08-21 PROCEDURE — 96409 CHEMO IV PUSH SNGL DRUG: CPT

## 2024-08-21 PROCEDURE — 77386 HC INTENSITY-MODULATED RADIATION THERAPY (IMRT), COMPLEX: CPT | Performed by: STUDENT IN AN ORGANIZED HEALTH CARE EDUCATION/TRAINING PROGRAM

## 2024-08-21 PROCEDURE — 96375 TX/PRO/DX INJ NEW DRUG ADDON: CPT | Mod: INF

## 2024-08-21 RX ORDER — FAMOTIDINE 10 MG/ML
20 INJECTION INTRAVENOUS ONCE AS NEEDED
Status: DISCONTINUED | OUTPATIENT
Start: 2024-08-21 | End: 2024-08-21 | Stop reason: HOSPADM

## 2024-08-21 RX ORDER — DIPHENHYDRAMINE HYDROCHLORIDE 50 MG/ML
50 INJECTION INTRAMUSCULAR; INTRAVENOUS AS NEEDED
Status: DISCONTINUED | OUTPATIENT
Start: 2024-08-21 | End: 2024-08-21 | Stop reason: HOSPADM

## 2024-08-21 RX ORDER — ONDANSETRON HYDROCHLORIDE 2 MG/ML
8 INJECTION, SOLUTION INTRAVENOUS ONCE
Status: COMPLETED | OUTPATIENT
Start: 2024-08-21 | End: 2024-08-21

## 2024-08-21 RX ORDER — PROCHLORPERAZINE MALEATE 10 MG
10 TABLET ORAL EVERY 6 HOURS PRN
Status: DISCONTINUED | OUTPATIENT
Start: 2024-08-21 | End: 2024-08-21 | Stop reason: HOSPADM

## 2024-08-21 RX ORDER — PROCHLORPERAZINE EDISYLATE 5 MG/ML
10 INJECTION INTRAMUSCULAR; INTRAVENOUS EVERY 6 HOURS PRN
Status: DISCONTINUED | OUTPATIENT
Start: 2024-08-21 | End: 2024-08-21 | Stop reason: HOSPADM

## 2024-08-21 RX ORDER — ALBUTEROL SULFATE 0.83 MG/ML
3 SOLUTION RESPIRATORY (INHALATION) AS NEEDED
Status: DISCONTINUED | OUTPATIENT
Start: 2024-08-21 | End: 2024-08-21 | Stop reason: HOSPADM

## 2024-08-21 RX ORDER — MITOMYCIN 20 MG/40ML
10 INJECTION, POWDER, LYOPHILIZED, FOR SOLUTION INTRAVENOUS ONCE
Status: COMPLETED | OUTPATIENT
Start: 2024-08-21 | End: 2024-08-21

## 2024-08-21 RX ORDER — EPINEPHRINE 0.3 MG/.3ML
0.3 INJECTION SUBCUTANEOUS EVERY 5 MIN PRN
Status: DISCONTINUED | OUTPATIENT
Start: 2024-08-21 | End: 2024-08-21 | Stop reason: HOSPADM

## 2024-08-21 ASSESSMENT — PATIENT HEALTH QUESTIONNAIRE - PHQ9
1. LITTLE INTEREST OR PLEASURE IN DOING THINGS: NOT AT ALL
2. FEELING DOWN, DEPRESSED OR HOPELESS: NOT AT ALL
SUM OF ALL RESPONSES TO PHQ9 QUESTIONS 1 AND 2: 0
2. FEELING DOWN, DEPRESSED OR HOPELESS: NOT AT ALL
1. LITTLE INTEREST OR PLEASURE IN DOING THINGS: NOT AT ALL
SUM OF ALL RESPONSES TO PHQ9 QUESTIONS 1 & 2: 0

## 2024-08-21 ASSESSMENT — ENCOUNTER SYMPTOMS
OCCASIONAL FEELINGS OF UNSTEADINESS: 0
DEPRESSION: 0
CARDIOVASCULAR NEGATIVE: 1
CONSTITUTIONAL NEGATIVE: 1
GASTROINTESTINAL NEGATIVE: 1
MUSCULOSKELETAL NEGATIVE: 1
LOSS OF SENSATION IN FEET: 0

## 2024-08-21 ASSESSMENT — PAIN SCALES - GENERAL: PAINLEVEL: 0-NO PAIN

## 2024-08-21 NOTE — PROGRESS NOTES
Treatment Pre-Review for Date of Service: 8/21/24    Diagnosis:  Squamous cell cancer, anus (Multi), Clinical: Stage I (cT1, cN0, cM0)      Regimen: Mitomycin Days 1 + 29    Current Cycle/Day: Cycle 1 Day  1    Treatment Date Appropriate: Yes; Last Treatment: NA, Cycle 1 Day 1   Date change required: none    Dose or Regimen Modifications: None noted during pre-review    Med Rec/Drug Interactions: None noted during pre-review    Growth Factor: none    Financial Clearance/Authorization: Yes    Echo:  No results found for this or any previous visit from the past 1095 days.    Lifetime Dose Tracking   No doses have been documented on this patient for the following tracked chemicals: doxorubicin, epirubicin, idarubicin, daunorubicin, mitoxantrone, bleomycin, mitomycin, carmustine, doxorubicin HCl pegylated liposomal, daunorubicin citrate liposomal     Comments: none    I Robert Cary, PharmD hereby acknowledge that the treatment plan indicated above has been verified for correctness to the best of my ability on 8/21/2024 at 8:04 AM.

## 2024-08-21 NOTE — PROGRESS NOTES
August 21, 2024 at 10:34 AM    Animal dander, House dust mite, Sulfa (sulfonamide antibiotics), Cat dander, and Codeine    Mary Palacios is a 69 y.o. female   There were no vitals taken for this visit.  There is no height or weight on file to calculate BSA.    Past Medical History:   Diagnosis Date    GERD (gastroesophageal reflux disease) 5/15/2015    Hypertension        Encounter Diagnosis   Name Primary?    Squamous cell cancer, anus (Multi)        Has the entire regimen been authorized by financial clearance (pre-certification)?  Yes     Prior to Admission medications    Medication Sig Start Date End Date Taking? Authorizing Provider   acetaminophen (Tylenol) 325 mg capsule Take by mouth.    Historical Provider, MD   amLODIPine (Norvasc) 10 mg tablet Take 1 tablet (10 mg) by mouth once daily. 5/21/24 8/21/24  Historical Provider, MD   atorvastatin (Lipitor) 20 mg tablet Take 1 tablet (20 mg) by mouth once daily. 5/21/24   Historical Provider, MD   azelastine (Optivar) 0.05 % ophthalmic solution INSTILL 1 DROP INTO AFFECTED EYE(S) TWICE A DAY AS NEEDED 9/2/23   Historical Provider, MD   busPIRone (Buspar) 10 mg tablet Take 1 tablet (10 mg) by mouth once daily. 7/8/24   Historical Provider, MD   capecitabine (Xeloda) 150 mg tablet Take 8 tablets (1,200 mg total) by mouth 2 times a day.  Take on days of radiation only. Take with food. Swallow whole with water. Do not crush or cut. Take with 500mg tablets for total dose of 1700mg. 8/12/24 9/24/24  Cristian Álvarez MD   capecitabine (Xeloda) 500 mg tablet Take 1 tablet (500 mg total) by mouth 2 times a day.  Take on days of radiation only. Take with food. Swallow whole with water. Do not crush or cut. Take with 150mg tablets for total dose of 1700mg. 8/12/24 9/24/24  Cristian Álvarez MD   ergocalciferol (Vitamin D-2) 1.25 MG (81907 UT) capsule Take 1 capsule (50,000 Units) by mouth once a week. 6/6/24   Historical Provider, MD   escitalopram (Lexapro) 20 mg  tablet Take 1 tablet (20 mg) by mouth once daily.    Historical Provider, MD   fexofenadine (Allegra) 180 mg tablet Take 1 tablet (180 mg) by mouth once daily.    Historical Provider, MD   hydrocortisone (Anusol-HC) 2.5 % rectal cream Insert into the rectum twice a day. 7/9/24 10/7/24  Historical Provider, MD   loperamide (Imodium) 2 mg capsule Take 2 capsules (4 mg) by mouth with the first episode of diarrhea and 1 capsule (2 mg) by mouth with any additional episodes. Maximum 8 capsules (16 mg) per day. 7/24/24   Cristian Álvarez MD   LORazepam (Ativan) 2 mg tablet Take 1 tablet (2 mg) by mouth every 6 hours if needed for anxiety for up to 1 day. Take one table 2 hours prior to your MRI 8/9/24 8/10/24  Jason Bell MD   losartan (Cozaar) 100 mg tablet Take 1 tablet (100 mg) by mouth once daily. 6/18/24   Historical Provider, MD   magnesium oxide (Mag-Ox) 400 mg (241.3 mg magnesium) tablet Take 1 tablet (400 mg) by mouth early in the morning.. 6/11/24   Historical Provider, MD   meclizine (Antivert) 12.5 mg tablet Take 1 tablet (12.5 mg) by mouth every 6 hours if needed. 9/18/23   Historical Provider, MD   omeprazole (PriLOSEC) 40 mg DR capsule Take 1 capsule (40 mg) by mouth once daily. 5/10/24   Historical Provider, MD   ondansetron (Zofran) 8 mg tablet Take 1 tablet (8 mg) by mouth every 8 hours if needed for nausea or vomiting. 7/24/24   Cristian Álvarez MD   prochlorperazine (Compazine) 10 mg tablet Take 1 tablet (10 mg) by mouth every 6 hours if needed for nausea or vomiting. 7/24/24   Cristian Álvarez MD       Reviewed documented list of home medications.  No significant drug interactions identified between home medications and chemotherapy regimen.    Yes    WBC   Date Value Ref Range Status   08/19/2024 8.4 4.4 - 11.3 x10*3/uL Final   07/24/2024 9.7 4.4 - 11.3 x10*3/uL Final     Hemoglobin   Date Value Ref Range Status   08/19/2024 13.3 12.0 - 16.0 g/dL Final   07/24/2024 13.2 12.0 - 16.0 g/dL Final      Hematocrit   Date Value Ref Range Status   08/19/2024 41.6 36.0 - 46.0 % Final   07/24/2024 40.4 36.0 - 46.0 % Final     Neutrophils Absolute   Date Value Ref Range Status   08/19/2024 5.84 1.20 - 7.70 x10*3/uL Final     Comment:     Percent differential counts (%) should be interpreted in the context of the absolute cell counts (cells/uL).   07/24/2024 6.51 1.20 - 7.70 x10*3/uL Final     Comment:     Percent differential counts (%) should be interpreted in the context of the absolute cell counts (cells/uL).     Platelets   Date Value Ref Range Status   08/19/2024 370 150 - 450 x10*3/uL Final   07/24/2024 409 150 - 450 x10*3/uL Final     Creatinine   Date Value Ref Range Status   08/19/2024 0.90 0.50 - 1.05 mg/dL Final   07/24/2024 1.21 (H) 0.50 - 1.05 mg/dL Final   07/23/2024 1.19 (H) 0.50 - 1.05 mg/dL Final     Alkaline Phosphatase   Date Value Ref Range Status   08/19/2024 109 33 - 136 U/L Final   07/24/2024 126 33 - 136 U/L Final     AST   Date Value Ref Range Status   08/19/2024 17 9 - 39 U/L Final   07/24/2024 20 9 - 39 U/L Final     ALT   Date Value Ref Range Status   08/19/2024 22 7 - 45 U/L Final     Comment:     Patients treated with Sulfasalazine may generate falsely decreased results for ALT.   07/24/2024 22 7 - 45 U/L Final     Comment:     Patients treated with Sulfasalazine may generate falsely decreased results for ALT.     Bilirubin, Total   Date Value Ref Range Status   08/19/2024 0.3 0.0 - 1.2 mg/dL Final   07/24/2024 0.4 0.0 - 1.2 mg/dL Final         Does the patient meet the treatment conditions?  Yes    ANC >= 1.5  Plt >= 100  Bili <= 1.5 x ULN  Crcl >= 50    Are dosage calculations correct?  Yes    Are doses the same as previous cycle?   Yes  Cycle 1  Were any doses modified?  No  Mitomycin dose capped at 20 mg    If appropriate, was the Creatinine Clearance independently calculated based on [facility] standards?   Yes      Comments:      I Robert Cary, PharmD hereby  acknowledge that the treatment plan indicated above has been verified for correctness to the best of my ability on 8/21/2024 at 10:34 AM.

## 2024-08-21 NOTE — SIGNIFICANT EVENT

## 2024-08-21 NOTE — PROGRESS NOTES
Radiation Oncology On Treatment Visit    Patient Name:  Raquel Palacios  MRN:  [id number]  :  1955    Referring Provider: No ref. provider found  Primary Care Provider: Jeremias Rubin DO  Care Team: Patient Care Team:  Jeremias Rubin DO as PCP - General (Family Medicine)  Cristian Álvarez MD as Medical Oncologist (Hematology and Oncology)    Date of Service: 2024     Diagnosis:   Specialty Problems          Radiation Oncology Problems    Squamous cell cancer, anus (Multi)         Treatment Summary:  IMRT: Not Applicable Anal canal, Bilateral Inguinal lymph node, Bilateral Pelvis    Treatment Period Technique Fraction Dose Fractions Total Dose   Course 1 2024-2024  (days elapsed: 0)         Anus_Pel_Ing 2024-2024 VMAT 180 / 180 cGy  180 / 5,040 cGy     SUBJECTIVE: Tolerated first chemo.  No current acute complaints.      OBJECTIVE:   Vital Signs:  /82 (BP Location: Left arm, Patient Position: Sitting, BP Cuff Size: Adult long)   Pulse 102   Temp 36.6 °C (97.9 °F) (Temporal)   Resp 16   Wt 95.4 kg (210 lb 5.1 oz)   SpO2 93%   BMI 33.95 kg/m²    Pain Scale: The patient's current pain level was assessed.  They report currently having a pain of 0 out of 10.    Other Pertinent Findings:     Toxicity Assessment          2024    14:08   Toxicity Assessment   Treatment Site Pelvis - female   Anorexia Grade 0   Anxiety Grade 0   Dehydration Grade 0   Depression Grade 0   Dermatitis Radiation Grade 0   Diarrhea Grade 0   Fatigue Grade 0   Fibrosis Deep Connective Tissue Grade 0   Fracture Grade 0   Nausea Grade 0   Pain Grade 0   Treatment Related Secondary Malignancy Grade 0   Tumor Pain Grade 0   Vomiting Grade 0   Lower Gastrointestinal Hemorrhage Grade 0   Proctitis Grade 0   Cystitis Noninfective Grade 0   Urinary Incontinence Grade 0   Pelvic Pain Grade 0   Pelvic Soft Tissue Necrosis Grade 0   Neuralgia Grade 0   Chronic Kidney Disease Grade 0   Vaginal Dryness  Grade 0   Vaginal Fistula Grade 0   Vaginal Hemorrhage Grade 0   Vaginal Stricture Grade 0   Lymphedema Grade 0   Thromboembolic Event Grade 0   Hot Flashes Grade 0        Concurrent systemic therapy: methylPREDNISolone sod succinate (SOLU-Medrol) 40 mg/mL injection 40 mg, 40 mg, intravenous, As needed, 1 of 1 cycle        mitoMYcin (Mutamycin) 20 mg injection in 40 mL, 10 mg/m2 = 20 mg, intravenous, Once, 1 of 1 cycle    Administration: 20 mg (8/21/2024)      Labs:   WBC   Date Value Ref Range Status   08/19/2024 8.4 4.4 - 11.3 x10*3/uL Final   07/24/2024 9.7 4.4 - 11.3 x10*3/uL Final     Hemoglobin   Date Value Ref Range Status   08/19/2024 13.3 12.0 - 16.0 g/dL Final   07/24/2024 13.2 12.0 - 16.0 g/dL Final     Hematocrit   Date Value Ref Range Status   08/19/2024 41.6 36.0 - 46.0 % Final   07/24/2024 40.4 36.0 - 46.0 % Final     Neutrophils Absolute   Date Value Ref Range Status   08/19/2024 5.84 1.20 - 7.70 x10*3/uL Final     Comment:     Percent differential counts (%) should be interpreted in the context of the absolute cell counts (cells/uL).   07/24/2024 6.51 1.20 - 7.70 x10*3/uL Final     Comment:     Percent differential counts (%) should be interpreted in the context of the absolute cell counts (cells/uL).     Platelets   Date Value Ref Range Status   08/19/2024 370 150 - 450 x10*3/uL Final   07/24/2024 409 150 - 450 x10*3/uL Final     Alkaline Phosphatase   Date Value Ref Range Status   08/19/2024 109 33 - 136 U/L Final   07/24/2024 126 33 - 136 U/L Final     AST   Date Value Ref Range Status   08/19/2024 17 9 - 39 U/L Final   07/24/2024 20 9 - 39 U/L Final     ALT   Date Value Ref Range Status   08/19/2024 22 7 - 45 U/L Final     Comment:     Patients treated with Sulfasalazine may generate falsely decreased results for ALT.   07/24/2024 22 7 - 45 U/L Final     Comment:     Patients treated with Sulfasalazine may generate falsely decreased results for ALT.     Bilirubin, Total   Date Value Ref Range  Status   08/19/2024 0.3 0.0 - 1.2 mg/dL Final   07/24/2024 0.4 0.0 - 1.2 mg/dL Final     Glucose   Date Value Ref Range Status   08/19/2024 136 (H) 74 - 99 mg/dL Final   07/24/2024 92 74 - 99 mg/dL Final     Calcium   Date Value Ref Range Status   08/19/2024 9.3 8.6 - 10.3 mg/dL Final   07/24/2024 9.2 8.6 - 10.3 mg/dL Final     Sodium   Date Value Ref Range Status   08/19/2024 139 136 - 145 mmol/L Final   07/24/2024 140 136 - 145 mmol/L Final     Potassium   Date Value Ref Range Status   08/19/2024 3.8 3.5 - 5.3 mmol/L Final   07/24/2024 4.4 3.5 - 5.3 mmol/L Final     Bicarbonate   Date Value Ref Range Status   08/19/2024 28 21 - 32 mmol/L Final   07/24/2024 31 21 - 32 mmol/L Final     Chloride   Date Value Ref Range Status   08/19/2024 103 98 - 107 mmol/L Final   07/24/2024 103 98 - 107 mmol/L Final     Urea Nitrogen   Date Value Ref Range Status   08/19/2024 9 6 - 23 mg/dL Final   07/24/2024 13 6 - 23 mg/dL Final     Creatinine   Date Value Ref Range Status   08/19/2024 0.90 0.50 - 1.05 mg/dL Final   07/24/2024 1.21 (H) 0.50 - 1.05 mg/dL Final         Exam: Resting comfortably in chair.     Assessment / Plan:  The patient is tolerating radiation therapy as anticipated.  Continue per current treatment plan.       Therapies: Discussed with the patient regarding taking Xeloda the morning before and the evening after on days of radiation therapy.  Discussed expected side effects.      Side effects reviewed with patient. Images, chart and labs reviewed.    Jorge Padilla MD

## 2024-08-21 NOTE — PROGRESS NOTES
"Patient ID: Mary Palacios is a 69 y.o. female.    Subjective:  Returns for follow up for anal cancer. Will start treatment today. Feels OK. Mild pain over rectal area. Denies recent fevert.     Heme/Onc History:  Stage/Status:  - p/w anal mass without pain in June 2024. Colonoscopy in Jul 2024: Neg except perianal mass. Bx showed p16 and p40 positive SCC  - PET/CT (Aug 2024): Uptake in anus. No LAPs. MR rectum (Aug 2024): 2.6 cm restricting lesion in inferior anal canal without LAPs  - Deemed not to be amenable to complete resection => Started Nigor protocol (chemoRT with capecitabine/mitomycin) on 8/21/24    Assessment/Plan:  ? Anal cancer: T2N0M0. Not resectable. Starting Irina protocol today with capecitabine and mitomycin. I again went over the possible side effects with the treatment inccluding renal failure, angina, neusea, dehydration, rash, local side effects... She is agreeable. Will repeat labs in 2 weeks.     Review Of Systems:  Review of Systems   Constitutional: Negative.    HENT:  Negative.     Cardiovascular: Negative.    Gastrointestinal: Negative.    Genitourinary: Negative.     Musculoskeletal: Negative.        Physical Exam:  /84 (BP Location: Left arm, Patient Position: Sitting, BP Cuff Size: Adult)   Pulse 87   Temp 36.7 °C (98.1 °F) (Temporal)   Resp 16   Ht 1.676 m (5' 6\")   Wt 95.5 kg (210 lb 8.6 oz)   SpO2 98%   BMI 33.98 kg/m²   BSA: 2.11 meters squared  Performance Status: Asymptomatic  Physical Exam  Constitutional:       Appearance: Normal appearance.   HENT:      Head: Normocephalic and atraumatic.   Eyes:      Pupils: Pupils are equal, round, and reactive to light.   Cardiovascular:      Rate and Rhythm: Normal rate and regular rhythm.   Pulmonary:      Effort: Pulmonary effort is normal.   Abdominal:      General: Abdomen is flat.      Palpations: Abdomen is soft. There is no mass.   Musculoskeletal:      Right lower leg: No edema.      Left lower leg: No edema. "   Lymphadenopathy:      Cervical: No cervical adenopathy.   Skin:     Coloration: Skin is not jaundiced.   Neurological:      General: No focal deficit present.      Mental Status: She is alert and oriented to person, place, and time.         Results:  Diagnostic Results   Lab Results   Component Value Date    WBC 8.4 08/19/2024    HGB 13.3 08/19/2024    HCT 41.6 08/19/2024    MCV 91 08/19/2024     08/19/2024     Lab Results   Component Value Date    CALCIUM 9.3 08/19/2024     08/19/2024    K 3.8 08/19/2024    CO2 28 08/19/2024     08/19/2024    BUN 9 08/19/2024    CREATININE 0.90 08/19/2024    ALT 22 08/19/2024    AST 17 08/19/2024       Current Outpatient Medications:     acetaminophen (Tylenol) 325 mg capsule, Take by mouth., Disp: , Rfl:     amLODIPine (Norvasc) 10 mg tablet, Take 1 tablet (10 mg) by mouth once daily., Disp: , Rfl:     atorvastatin (Lipitor) 20 mg tablet, Take 1 tablet (20 mg) by mouth once daily., Disp: , Rfl:     azelastine (Optivar) 0.05 % ophthalmic solution, INSTILL 1 DROP INTO AFFECTED EYE(S) TWICE A DAY AS NEEDED, Disp: , Rfl:     busPIRone (Buspar) 10 mg tablet, Take 1 tablet (10 mg) by mouth once daily., Disp: , Rfl:     capecitabine (Xeloda) 150 mg tablet, Take 8 tablets (1,200 mg total) by mouth 2 times a day.  Take on days of radiation only. Take with food. Swallow whole with water. Do not crush or cut. Take with 500mg tablets for total dose of 1700mg., Disp: 480 tablet, Rfl: 0    capecitabine (Xeloda) 500 mg tablet, Take 1 tablet (500 mg total) by mouth 2 times a day.  Take on days of radiation only. Take with food. Swallow whole with water. Do not crush or cut. Take with 150mg tablets for total dose of 1700mg., Disp: 60 tablet, Rfl: 0    ergocalciferol (Vitamin D-2) 1.25 MG (89261 UT) capsule, Take 1 capsule (50,000 Units) by mouth once a week., Disp: , Rfl:     escitalopram (Lexapro) 20 mg tablet, Take 1 tablet (20 mg) by mouth once daily., Disp: , Rfl:      fexofenadine (Allegra) 180 mg tablet, Take 1 tablet (180 mg) by mouth once daily., Disp: , Rfl:     hydrocortisone (Anusol-HC) 2.5 % rectal cream, Insert into the rectum twice a day., Disp: , Rfl:     loperamide (Imodium) 2 mg capsule, Take 2 capsules (4 mg) by mouth with the first episode of diarrhea and 1 capsule (2 mg) by mouth with any additional episodes. Maximum 8 capsules (16 mg) per day., Disp: 100 capsule, Rfl: 2    losartan (Cozaar) 100 mg tablet, Take 1 tablet (100 mg) by mouth once daily., Disp: , Rfl:     magnesium oxide (Mag-Ox) 400 mg (241.3 mg magnesium) tablet, Take 1 tablet (400 mg) by mouth early in the morning.., Disp: , Rfl:     meclizine (Antivert) 12.5 mg tablet, Take 1 tablet (12.5 mg) by mouth every 6 hours if needed., Disp: , Rfl:     omeprazole (PriLOSEC) 40 mg DR capsule, Take 1 capsule (40 mg) by mouth once daily., Disp: , Rfl:     ondansetron (Zofran) 8 mg tablet, Take 1 tablet (8 mg) by mouth every 8 hours if needed for nausea or vomiting., Disp: 30 tablet, Rfl: 5    prochlorperazine (Compazine) 10 mg tablet, Take 1 tablet (10 mg) by mouth every 6 hours if needed for nausea or vomiting., Disp: 30 tablet, Rfl: 5    LORazepam (Ativan) 2 mg tablet, Take 1 tablet (2 mg) by mouth every 6 hours if needed for anxiety for up to 1 day. Take one table 2 hours prior to your MRI, Disp: 1 tablet, Rfl: 0  No current facility-administered medications for this visit.    Facility-Administered Medications Ordered in Other Visits:     albuterol 2.5 mg /3 mL (0.083 %) nebulizer solution 3 mL, 3 mL, nebulization, PRN, Cristian Álvarez MD    dextrose 5 % in water (D5W) bolus, 500 mL, intravenous, PRN, Cristian Álvarez MD    diphenhydrAMINE (BENADryl) injection 50 mg, 50 mg, intravenous, PRN, Cristian Álvarez MD    EPINEPHrine (Epipen) injection syringe 0.3 mg, 0.3 mg, intramuscular, q5 min PRN, Cristian Álvarez MD    famotidine PF (Pepcid) injection 20 mg, 20 mg, intravenous, Once PRN, Cristian Álvarez,  MD    methylPREDNISolone sod succinate (SOLU-Medrol) 40 mg/mL injection 40 mg, 40 mg, intravenous, PRN, Cristian Álvarez MD    prochlorperazine (Compazine) injection 10 mg, 10 mg, intravenous, q6h PRN, Cristian Álvarez MD    prochlorperazine (Compazine) tablet 10 mg, 10 mg, oral, q6h PRN, Cristian Álvarez MD    sodium chloride 0.9 % bolus 500 mL, 500 mL, intravenous, PRN, Cristian Álvarez MD     Past Surgical History:   Procedure Laterality Date    HERNIA REPAIR       Family History   Problem Relation Name Age of Onset    Colon cancer Mother Regla Kotila     Melanoma Mother Regla Kotila     Cancer Mother Reglashara Lermaa     Prostate cancer Father Lester Lermaa     Cancer Father Lester Fernández     Breast cancer Mother's Sister      Lung cancer Mother's Sister      Lung cancer Mother's Brother      Cancer Mother's Sister Sylvie Mckeonneman     Cancer Mother's Sister Piper Louie     Cancer Mother's Brother Anatoly Cool       reports that she has been smoking cigarettes. She has a 7.5 pack-year smoking history. She has been exposed to tobacco smoke. She has never used smokeless tobacco.  Social History     Socioeconomic History    Marital status:      Spouse name: Not on file    Number of children: Not on file    Years of education: Not on file    Highest education level: Not on file   Occupational History    Not on file   Tobacco Use    Smoking status: Every Day     Current packs/day: 0.50     Average packs/day: 0.5 packs/day for 15.0 years (7.5 ttl pk-yrs)     Types: Cigarettes     Passive exposure: Past    Smokeless tobacco: Never   Vaping Use    Vaping status: Never Used   Substance and Sexual Activity    Alcohol use: Never    Drug use: Yes     Types: Marijuana    Sexual activity: Not Currently   Other Topics Concern    Not on file   Social History Narrative    Not on file     Social Determinants of Health     Financial Resource Strain: Low Risk  (7/24/2024)    Overall Financial Resource Strain (CARDIA)      Difficulty of Paying Living Expenses: Not hard at all   Food Insecurity: No Food Insecurity (7/24/2024)    Hunger Vital Sign     Worried About Running Out of Food in the Last Year: Never true     Ran Out of Food in the Last Year: Never true   Transportation Needs: No Transportation Needs (7/24/2024)    PRAPARE - Transportation     Lack of Transportation (Medical): No     Lack of Transportation (Non-Medical): No   Physical Activity: Inactive (7/24/2024)    Exercise Vital Sign     Days of Exercise per Week: 0 days     Minutes of Exercise per Session: 0 min   Stress: No Stress Concern Present (8/21/2024)    [employer] - Occupational Stress Questionnaire     Feeling of Stress : Not at all   Social Connections: Moderately Integrated (7/24/2024)    Social Connection and Isolation Panel [NHANES]     Frequency of Communication with Friends and Family: More than three times a week     Frequency of Social Gatherings with Friends and Family: Three times a week     Attends Buddhism Services: More than 4 times per year     Active Member of Clubs or Organizations: Yes     Attends Club or Organization Meetings: 1 to 4 times per year     Marital Status:    Intimate Partner Violence: Not At Risk (7/24/2024)    Humiliation, Afraid, Rape, and Kick questionnaire     Fear of Current or Ex-Partner: No     Emotionally Abused: No     Physically Abused: No     Sexually Abused: No   Housing Stability: Low Risk  (7/24/2024)    Housing Stability Vital Sign     Unable to Pay for Housing in the Last Year: No     Number of Times Moved in the Last Year: 1     Homeless in the Last Year: No       Diagnoses and all orders for this visit:  Squamous cell cancer, anus (Multi)  -     Clinic Appointment Request  -     CBC and Auto Differential; Future  -     Clinic Appointment Request; Future  -     Comprehensive Metabolic Panel; Future       I spent more than 30 minutes for the patient today, including face-to-face  conversation, pre-visit preparation, post-visit orders, and others.   Cristian Álvarez MD

## 2024-08-22 ENCOUNTER — HOSPITAL ENCOUNTER (OUTPATIENT)
Dept: RADIATION ONCOLOGY | Facility: CLINIC | Age: 69
Setting detail: RADIATION/ONCOLOGY SERIES
Discharge: HOME | End: 2024-08-22
Payer: MEDICARE

## 2024-08-22 DIAGNOSIS — C21.0 MALIGNANT NEOPLASM OF ANUS, UNSPECIFIED (MULTI): ICD-10-CM

## 2024-08-22 DIAGNOSIS — Z51.0 ENCOUNTER FOR ANTINEOPLASTIC RADIATION THERAPY: ICD-10-CM

## 2024-08-22 LAB
RAD ONC MSQ ACTUAL FRACTIONS DELIVERED: 2
RAD ONC MSQ ACTUAL SESSION DELIVERED DOSE: 180 CGRAY
RAD ONC MSQ ACTUAL TOTAL DOSE: 360 CGRAY
RAD ONC MSQ ELAPSED DAYS: 1
RAD ONC MSQ LAST DATE: NORMAL
RAD ONC MSQ PRESCRIBED FRACTIONAL DOSE: 180 CGRAY
RAD ONC MSQ PRESCRIBED NUMBER OF FRACTIONS: 28
RAD ONC MSQ PRESCRIBED TECHNIQUE: NORMAL
RAD ONC MSQ PRESCRIBED TOTAL DOSE: 5040 CGRAY
RAD ONC MSQ PRESCRIPTION PATTERN COMMENT: NORMAL
RAD ONC MSQ START DATE: NORMAL
RAD ONC MSQ TREATMENT COURSE NUMBER: 1
RAD ONC MSQ TREATMENT SITE: NORMAL

## 2024-08-22 PROCEDURE — 77386 HC INTENSITY-MODULATED RADIATION THERAPY (IMRT), COMPLEX: CPT | Performed by: STUDENT IN AN ORGANIZED HEALTH CARE EDUCATION/TRAINING PROGRAM

## 2024-08-23 ENCOUNTER — HOSPITAL ENCOUNTER (OUTPATIENT)
Dept: RADIATION ONCOLOGY | Facility: CLINIC | Age: 69
Setting detail: RADIATION/ONCOLOGY SERIES
Discharge: HOME | End: 2024-08-23
Payer: MEDICARE

## 2024-08-23 DIAGNOSIS — C21.0 MALIGNANT NEOPLASM OF ANUS, UNSPECIFIED (MULTI): ICD-10-CM

## 2024-08-23 DIAGNOSIS — Z51.0 ENCOUNTER FOR ANTINEOPLASTIC RADIATION THERAPY: ICD-10-CM

## 2024-08-23 LAB
RAD ONC MSQ ACTUAL FRACTIONS DELIVERED: 3
RAD ONC MSQ ACTUAL SESSION DELIVERED DOSE: 180 CGRAY
RAD ONC MSQ ACTUAL TOTAL DOSE: 540 CGRAY
RAD ONC MSQ ELAPSED DAYS: 2
RAD ONC MSQ LAST DATE: NORMAL
RAD ONC MSQ PRESCRIBED FRACTIONAL DOSE: 180 CGRAY
RAD ONC MSQ PRESCRIBED NUMBER OF FRACTIONS: 28
RAD ONC MSQ PRESCRIBED TECHNIQUE: NORMAL
RAD ONC MSQ PRESCRIBED TOTAL DOSE: 5040 CGRAY
RAD ONC MSQ PRESCRIPTION PATTERN COMMENT: NORMAL
RAD ONC MSQ START DATE: NORMAL
RAD ONC MSQ TREATMENT COURSE NUMBER: 1
RAD ONC MSQ TREATMENT SITE: NORMAL

## 2024-08-23 PROCEDURE — 77386 HC INTENSITY-MODULATED RADIATION THERAPY (IMRT), COMPLEX: CPT | Performed by: STUDENT IN AN ORGANIZED HEALTH CARE EDUCATION/TRAINING PROGRAM

## 2024-08-26 ENCOUNTER — HOSPITAL ENCOUNTER (OUTPATIENT)
Dept: RADIATION ONCOLOGY | Facility: CLINIC | Age: 69
Setting detail: RADIATION/ONCOLOGY SERIES
Discharge: HOME | End: 2024-08-26
Payer: MEDICARE

## 2024-08-26 DIAGNOSIS — Z51.0 ENCOUNTER FOR ANTINEOPLASTIC RADIATION THERAPY: ICD-10-CM

## 2024-08-26 DIAGNOSIS — C21.0 MALIGNANT NEOPLASM OF ANUS, UNSPECIFIED (MULTI): ICD-10-CM

## 2024-08-26 LAB
RAD ONC MSQ ACTUAL FRACTIONS DELIVERED: 4
RAD ONC MSQ ACTUAL SESSION DELIVERED DOSE: 180 CGRAY
RAD ONC MSQ ACTUAL TOTAL DOSE: 720 CGRAY
RAD ONC MSQ ELAPSED DAYS: 5
RAD ONC MSQ LAST DATE: NORMAL
RAD ONC MSQ PRESCRIBED FRACTIONAL DOSE: 180 CGRAY
RAD ONC MSQ PRESCRIBED NUMBER OF FRACTIONS: 28
RAD ONC MSQ PRESCRIBED TECHNIQUE: NORMAL
RAD ONC MSQ PRESCRIBED TOTAL DOSE: 5040 CGRAY
RAD ONC MSQ PRESCRIPTION PATTERN COMMENT: NORMAL
RAD ONC MSQ START DATE: NORMAL
RAD ONC MSQ TREATMENT COURSE NUMBER: 1
RAD ONC MSQ TREATMENT SITE: NORMAL

## 2024-08-26 PROCEDURE — 77386 HC INTENSITY-MODULATED RADIATION THERAPY (IMRT), COMPLEX: CPT | Performed by: STUDENT IN AN ORGANIZED HEALTH CARE EDUCATION/TRAINING PROGRAM

## 2024-08-27 ENCOUNTER — HOSPITAL ENCOUNTER (OUTPATIENT)
Dept: RADIATION ONCOLOGY | Facility: CLINIC | Age: 69
Setting detail: RADIATION/ONCOLOGY SERIES
Discharge: HOME | End: 2024-08-27
Payer: MEDICARE

## 2024-08-27 DIAGNOSIS — C21.0 MALIGNANT NEOPLASM OF ANUS, UNSPECIFIED (MULTI): ICD-10-CM

## 2024-08-27 DIAGNOSIS — Z51.0 ENCOUNTER FOR ANTINEOPLASTIC RADIATION THERAPY: ICD-10-CM

## 2024-08-27 LAB
RAD ONC MSQ ACTUAL FRACTIONS DELIVERED: 5
RAD ONC MSQ ACTUAL SESSION DELIVERED DOSE: 180 CGRAY
RAD ONC MSQ ACTUAL TOTAL DOSE: 900 CGRAY
RAD ONC MSQ ELAPSED DAYS: 6
RAD ONC MSQ LAST DATE: NORMAL
RAD ONC MSQ PRESCRIBED FRACTIONAL DOSE: 180 CGRAY
RAD ONC MSQ PRESCRIBED NUMBER OF FRACTIONS: 28
RAD ONC MSQ PRESCRIBED TECHNIQUE: NORMAL
RAD ONC MSQ PRESCRIBED TOTAL DOSE: 5040 CGRAY
RAD ONC MSQ PRESCRIPTION PATTERN COMMENT: NORMAL
RAD ONC MSQ START DATE: NORMAL
RAD ONC MSQ TREATMENT COURSE NUMBER: 1
RAD ONC MSQ TREATMENT SITE: NORMAL

## 2024-08-27 PROCEDURE — 77386 HC INTENSITY-MODULATED RADIATION THERAPY (IMRT), COMPLEX: CPT | Performed by: STUDENT IN AN ORGANIZED HEALTH CARE EDUCATION/TRAINING PROGRAM

## 2024-08-27 PROCEDURE — 77336 RADIATION PHYSICS CONSULT: CPT | Performed by: STUDENT IN AN ORGANIZED HEALTH CARE EDUCATION/TRAINING PROGRAM

## 2024-08-28 ENCOUNTER — SPECIALTY PHARMACY (OUTPATIENT)
Dept: PHARMACY | Facility: CLINIC | Age: 69
End: 2024-08-28

## 2024-08-28 ENCOUNTER — TELEPHONE (OUTPATIENT)
Dept: HEMATOLOGY/ONCOLOGY | Facility: HOSPITAL | Age: 69
End: 2024-08-28
Payer: MEDICARE

## 2024-08-28 ENCOUNTER — HOSPITAL ENCOUNTER (OUTPATIENT)
Dept: RADIATION ONCOLOGY | Facility: CLINIC | Age: 69
Setting detail: RADIATION/ONCOLOGY SERIES
Discharge: HOME | End: 2024-08-28
Payer: MEDICARE

## 2024-08-28 ENCOUNTER — RADIATION ONCOLOGY OTV (OUTPATIENT)
Dept: RADIATION ONCOLOGY | Facility: CLINIC | Age: 69
End: 2024-08-28
Payer: MEDICARE

## 2024-08-28 VITALS
DIASTOLIC BLOOD PRESSURE: 83 MMHG | OXYGEN SATURATION: 98 % | TEMPERATURE: 96.8 F | RESPIRATION RATE: 18 BRPM | BODY MASS INDEX: 33.91 KG/M2 | HEART RATE: 92 BPM | WEIGHT: 210.1 LBS | SYSTOLIC BLOOD PRESSURE: 131 MMHG

## 2024-08-28 DIAGNOSIS — C21.0 MALIGNANT NEOPLASM OF ANUS, UNSPECIFIED (MULTI): ICD-10-CM

## 2024-08-28 DIAGNOSIS — Z51.0 ENCOUNTER FOR ANTINEOPLASTIC RADIATION THERAPY: ICD-10-CM

## 2024-08-28 LAB
RAD ONC MSQ ACTUAL FRACTIONS DELIVERED: 6
RAD ONC MSQ ACTUAL SESSION DELIVERED DOSE: 180 CGRAY
RAD ONC MSQ ACTUAL TOTAL DOSE: 1080 CGRAY
RAD ONC MSQ ELAPSED DAYS: 7
RAD ONC MSQ LAST DATE: NORMAL
RAD ONC MSQ PRESCRIBED FRACTIONAL DOSE: 180 CGRAY
RAD ONC MSQ PRESCRIBED NUMBER OF FRACTIONS: 28
RAD ONC MSQ PRESCRIBED TECHNIQUE: NORMAL
RAD ONC MSQ PRESCRIBED TOTAL DOSE: 5040 CGRAY
RAD ONC MSQ PRESCRIPTION PATTERN COMMENT: NORMAL
RAD ONC MSQ START DATE: NORMAL
RAD ONC MSQ TREATMENT COURSE NUMBER: 1
RAD ONC MSQ TREATMENT SITE: NORMAL

## 2024-08-28 PROCEDURE — 77386 HC INTENSITY-MODULATED RADIATION THERAPY (IMRT), COMPLEX: CPT | Performed by: STUDENT IN AN ORGANIZED HEALTH CARE EDUCATION/TRAINING PROGRAM

## 2024-08-28 ASSESSMENT — PAIN SCALES - GENERAL: PAINLEVEL: 0-NO PAIN

## 2024-08-28 NOTE — TELEPHONE ENCOUNTER
Pt states she did well after first dose chemo. Fatigued, but doing well. Radiation treatments are being tolerated well, also. Pt aware to call office if she has any needs or questions.

## 2024-08-28 NOTE — PROGRESS NOTES
Radiation Oncology On Treatment Visit    Patient Name:  Raquel Palacios  MRN:  [id number]  :  1955    Referring Provider: No ref. provider found  Primary Care Provider: Jeremias Rubin DO  Care Team: Patient Care Team:  Jeremias Rubin DO as PCP - General (Family Medicine)  Cristian Álvarez MD as Medical Oncologist (Hematology and Oncology)    Date of Service: 2024     Diagnosis:   Specialty Problems          Radiation Oncology Problems    Squamous cell cancer, anus (Multi)         Treatment Summary:  IMRT: Not Applicable Anal canal, Bilateral Inguinal lymph node, Bilateral Pelvis    Treatment Period Technique Fraction Dose Fractions Total Dose   Course 1 2024-2024  (days elapsed: 7)         Anus_Pel_Ing 2024-2024 VMAT 180 / 180 cGy  1080 / 5,040 cGy     SUBJECTIVE: Nausea has antiemetics. Diarrhea with yellow stools, imodium as needed. No issues with skin, aquaphor at bedtime.      OBJECTIVE:   Vital Signs:  /83 (Patient Position: Sitting)   Pulse 92   Temp 36 °C (96.8 °F)   Resp 18   Wt 95.3 kg (210 lb 1.6 oz)   SpO2 98%   BMI 33.91 kg/m²    Pain Scale: The patient's current pain level was assessed.  They report currently having a pain of 0 out of 10.    Other Pertinent Findings:     Toxicity Assessment          2024    14:08 2024    11:13   Toxicity Assessment   Treatment Site Pelvis - female Pelvis - female   Anorexia Grade 0 Grade 1   Anxiety Grade 0 Grade 0   Dehydration Grade 0 Grade 0   Depression Grade 0 Grade 0   Dermatitis Radiation Grade 0 Grade 0   Diarrhea Grade 0 Grade 1       liquid stool this morning 4 times then took immodium'   Fatigue Grade 0 Grade 1   Fibrosis Deep Connective Tissue Grade 0 Grade 0   Fracture Grade 0 Grade 0   Nausea Grade 0 Grade 1   Pain Grade 0 Grade 0   Treatment Related Secondary Malignancy Grade 0 Grade 0   Tumor Pain Grade 0 Grade 0   Vomiting Grade 0 Grade 0   Lower Gastrointestinal Hemorrhage Grade 0 Grade 0    Proctitis Grade 0 Grade 0   Cystitis Noninfective Grade 0 Grade 0   Urinary Incontinence Grade 0 Grade 0   Pelvic Pain Grade 0 Grade 0   Pelvic Soft Tissue Necrosis Grade 0 Grade 0   Neuralgia Grade 0 Grade 0   Chronic Kidney Disease Grade 0 Grade 0   Vaginal Dryness Grade 0 Grade 0   Vaginal Fistula Grade 0 Grade 0   Vaginal Hemorrhage Grade 0 Grade 0   Vaginal Stricture Grade 0 Grade 0   Lymphedema Grade 0 Grade 0   Thromboembolic Event Grade 0 Grade 0   Hot Flashes Grade 0 Grade 0        Concurrent systemic therapy: methylPREDNISolone sod succinate (SOLU-Medrol) 40 mg/mL injection 40 mg, 40 mg, intravenous, As needed, 1 of 1 cycle        mitoMYcin (Mutamycin) 20 mg injection in 40 mL, 10 mg/m2 = 20 mg, intravenous, Once, 1 of 1 cycle    Administration: 20 mg (8/21/2024)    Xeloda    Labs:   WBC   Date Value Ref Range Status   08/19/2024 8.4 4.4 - 11.3 x10*3/uL Final   07/24/2024 9.7 4.4 - 11.3 x10*3/uL Final     Hemoglobin   Date Value Ref Range Status   08/19/2024 13.3 12.0 - 16.0 g/dL Final   07/24/2024 13.2 12.0 - 16.0 g/dL Final     Hematocrit   Date Value Ref Range Status   08/19/2024 41.6 36.0 - 46.0 % Final   07/24/2024 40.4 36.0 - 46.0 % Final     Neutrophils Absolute   Date Value Ref Range Status   08/19/2024 5.84 1.20 - 7.70 x10*3/uL Final     Comment:     Percent differential counts (%) should be interpreted in the context of the absolute cell counts (cells/uL).   07/24/2024 6.51 1.20 - 7.70 x10*3/uL Final     Comment:     Percent differential counts (%) should be interpreted in the context of the absolute cell counts (cells/uL).     Platelets   Date Value Ref Range Status   08/19/2024 370 150 - 450 x10*3/uL Final   07/24/2024 409 150 - 450 x10*3/uL Final     Alkaline Phosphatase   Date Value Ref Range Status   08/19/2024 109 33 - 136 U/L Final   07/24/2024 126 33 - 136 U/L Final     AST   Date Value Ref Range Status   08/19/2024 17 9 - 39 U/L Final   07/24/2024 20 9 - 39 U/L Final     ALT   Date  Value Ref Range Status   08/19/2024 22 7 - 45 U/L Final     Comment:     Patients treated with Sulfasalazine may generate falsely decreased results for ALT.   07/24/2024 22 7 - 45 U/L Final     Comment:     Patients treated with Sulfasalazine may generate falsely decreased results for ALT.     Bilirubin, Total   Date Value Ref Range Status   08/19/2024 0.3 0.0 - 1.2 mg/dL Final   07/24/2024 0.4 0.0 - 1.2 mg/dL Final     Glucose   Date Value Ref Range Status   08/19/2024 136 (H) 74 - 99 mg/dL Final   07/24/2024 92 74 - 99 mg/dL Final     Calcium   Date Value Ref Range Status   08/19/2024 9.3 8.6 - 10.3 mg/dL Final   07/24/2024 9.2 8.6 - 10.3 mg/dL Final     Sodium   Date Value Ref Range Status   08/19/2024 139 136 - 145 mmol/L Final   07/24/2024 140 136 - 145 mmol/L Final     Potassium   Date Value Ref Range Status   08/19/2024 3.8 3.5 - 5.3 mmol/L Final   07/24/2024 4.4 3.5 - 5.3 mmol/L Final     Bicarbonate   Date Value Ref Range Status   08/19/2024 28 21 - 32 mmol/L Final   07/24/2024 31 21 - 32 mmol/L Final     Chloride   Date Value Ref Range Status   08/19/2024 103 98 - 107 mmol/L Final   07/24/2024 103 98 - 107 mmol/L Final     Urea Nitrogen   Date Value Ref Range Status   08/19/2024 9 6 - 23 mg/dL Final   07/24/2024 13 6 - 23 mg/dL Final     Creatinine   Date Value Ref Range Status   08/19/2024 0.90 0.50 - 1.05 mg/dL Final   07/24/2024 1.21 (H) 0.50 - 1.05 mg/dL Final         Exam: Resting comfortable in chair.     Assessment / Plan:  The patient is tolerating radiation therapy as anticipated.  Continue per current treatment plan.       Therapies: The patient will be obtaining lab work in the next week.  Continue Aquaphor as needed for skin care.  Discussed low fiber diet, Imodium as needed and transition to wet wipes and use of sitz bath.  Antiemetics as needed for nausea.    Side effects reviewed with patient. Images, chart and labs reviewed.    Jorge Padilla MD

## 2024-08-29 ENCOUNTER — HOSPITAL ENCOUNTER (OUTPATIENT)
Dept: RADIATION ONCOLOGY | Facility: CLINIC | Age: 69
Setting detail: RADIATION/ONCOLOGY SERIES
Discharge: HOME | End: 2024-08-29
Payer: MEDICARE

## 2024-08-29 DIAGNOSIS — Z51.0 ENCOUNTER FOR ANTINEOPLASTIC RADIATION THERAPY: ICD-10-CM

## 2024-08-29 DIAGNOSIS — C21.0 MALIGNANT NEOPLASM OF ANUS, UNSPECIFIED (MULTI): ICD-10-CM

## 2024-08-29 LAB
RAD ONC MSQ ACTUAL FRACTIONS DELIVERED: 7
RAD ONC MSQ ACTUAL SESSION DELIVERED DOSE: 180 CGRAY
RAD ONC MSQ ACTUAL TOTAL DOSE: 1260 CGRAY
RAD ONC MSQ ELAPSED DAYS: 8
RAD ONC MSQ LAST DATE: NORMAL
RAD ONC MSQ PRESCRIBED FRACTIONAL DOSE: 180 CGRAY
RAD ONC MSQ PRESCRIBED NUMBER OF FRACTIONS: 28
RAD ONC MSQ PRESCRIBED TECHNIQUE: NORMAL
RAD ONC MSQ PRESCRIBED TOTAL DOSE: 5040 CGRAY
RAD ONC MSQ PRESCRIPTION PATTERN COMMENT: NORMAL
RAD ONC MSQ START DATE: NORMAL
RAD ONC MSQ TREATMENT COURSE NUMBER: 1
RAD ONC MSQ TREATMENT SITE: NORMAL

## 2024-08-29 PROCEDURE — 77386 HC INTENSITY-MODULATED RADIATION THERAPY (IMRT), COMPLEX: CPT | Performed by: STUDENT IN AN ORGANIZED HEALTH CARE EDUCATION/TRAINING PROGRAM

## 2024-08-30 ENCOUNTER — HOSPITAL ENCOUNTER (OUTPATIENT)
Dept: RADIATION ONCOLOGY | Facility: CLINIC | Age: 69
Setting detail: RADIATION/ONCOLOGY SERIES
Discharge: HOME | End: 2024-08-30
Payer: MEDICARE

## 2024-08-30 DIAGNOSIS — Z51.0 ENCOUNTER FOR ANTINEOPLASTIC RADIATION THERAPY: ICD-10-CM

## 2024-08-30 DIAGNOSIS — C21.0 MALIGNANT NEOPLASM OF ANUS, UNSPECIFIED (MULTI): ICD-10-CM

## 2024-08-30 LAB
RAD ONC MSQ ACTUAL FRACTIONS DELIVERED: 8
RAD ONC MSQ ACTUAL SESSION DELIVERED DOSE: 180 CGRAY
RAD ONC MSQ ACTUAL TOTAL DOSE: 1440 CGRAY
RAD ONC MSQ ELAPSED DAYS: 9
RAD ONC MSQ LAST DATE: NORMAL
RAD ONC MSQ PRESCRIBED FRACTIONAL DOSE: 180 CGRAY
RAD ONC MSQ PRESCRIBED NUMBER OF FRACTIONS: 28
RAD ONC MSQ PRESCRIBED TECHNIQUE: NORMAL
RAD ONC MSQ PRESCRIBED TOTAL DOSE: 5040 CGRAY
RAD ONC MSQ PRESCRIPTION PATTERN COMMENT: NORMAL
RAD ONC MSQ START DATE: NORMAL
RAD ONC MSQ TREATMENT COURSE NUMBER: 1
RAD ONC MSQ TREATMENT SITE: NORMAL

## 2024-08-30 PROCEDURE — 77386 HC INTENSITY-MODULATED RADIATION THERAPY (IMRT), COMPLEX: CPT | Performed by: STUDENT IN AN ORGANIZED HEALTH CARE EDUCATION/TRAINING PROGRAM

## 2024-09-03 ENCOUNTER — HOSPITAL ENCOUNTER (OUTPATIENT)
Dept: RADIATION ONCOLOGY | Facility: CLINIC | Age: 69
Setting detail: RADIATION/ONCOLOGY SERIES
Discharge: HOME | End: 2024-09-03
Payer: MEDICARE

## 2024-09-03 ENCOUNTER — TELEPHONE (OUTPATIENT)
Dept: HEMATOLOGY/ONCOLOGY | Facility: HOSPITAL | Age: 69
End: 2024-09-03
Payer: MEDICARE

## 2024-09-03 DIAGNOSIS — Z51.0 ENCOUNTER FOR ANTINEOPLASTIC RADIATION THERAPY: ICD-10-CM

## 2024-09-03 DIAGNOSIS — C21.0 MALIGNANT NEOPLASM OF ANUS, UNSPECIFIED (MULTI): ICD-10-CM

## 2024-09-03 LAB
RAD ONC MSQ ACTUAL FRACTIONS DELIVERED: 9
RAD ONC MSQ ACTUAL SESSION DELIVERED DOSE: 180 CGRAY
RAD ONC MSQ ACTUAL TOTAL DOSE: 1620 CGRAY
RAD ONC MSQ ELAPSED DAYS: 13
RAD ONC MSQ LAST DATE: NORMAL
RAD ONC MSQ PRESCRIBED FRACTIONAL DOSE: 180 CGRAY
RAD ONC MSQ PRESCRIBED NUMBER OF FRACTIONS: 28
RAD ONC MSQ PRESCRIBED TECHNIQUE: NORMAL
RAD ONC MSQ PRESCRIBED TOTAL DOSE: 5040 CGRAY
RAD ONC MSQ PRESCRIPTION PATTERN COMMENT: NORMAL
RAD ONC MSQ START DATE: NORMAL
RAD ONC MSQ TREATMENT COURSE NUMBER: 1
RAD ONC MSQ TREATMENT SITE: NORMAL

## 2024-09-03 PROCEDURE — 77336 RADIATION PHYSICS CONSULT: CPT | Performed by: STUDENT IN AN ORGANIZED HEALTH CARE EDUCATION/TRAINING PROGRAM

## 2024-09-03 PROCEDURE — 77386 HC INTENSITY-MODULATED RADIATION THERAPY (IMRT), COMPLEX: CPT | Performed by: STUDENT IN AN ORGANIZED HEALTH CARE EDUCATION/TRAINING PROGRAM

## 2024-09-03 NOTE — TELEPHONE ENCOUNTER
Patient scheduled for HEM ONC ESTABLISHED PATIENT VISIT with Dr. Hall on Thursday, 9/5/24 at 11:45 AM. Patient called in to cancel appointment. Per patient, she will be seeing her other doctor on Thursday and not feel like this appointment is needed. Requested to cancel and to keep scheduled appointment on Wednesday, 9/18/24 at 8:45 AM. Appointment for Thursday cancelled. Patient verbalized understanding and agreement regarding discussed information via verbal feedback.

## 2024-09-04 ENCOUNTER — RADIATION ONCOLOGY OTV (OUTPATIENT)
Dept: RADIATION ONCOLOGY | Facility: CLINIC | Age: 69
End: 2024-09-04
Payer: MEDICARE

## 2024-09-04 ENCOUNTER — HOSPITAL ENCOUNTER (OUTPATIENT)
Dept: RADIATION ONCOLOGY | Facility: CLINIC | Age: 69
Setting detail: RADIATION/ONCOLOGY SERIES
Discharge: HOME | End: 2024-09-04
Payer: MEDICARE

## 2024-09-04 VITALS
DIASTOLIC BLOOD PRESSURE: 78 MMHG | WEIGHT: 209.22 LBS | BODY MASS INDEX: 33.77 KG/M2 | OXYGEN SATURATION: 98 % | SYSTOLIC BLOOD PRESSURE: 147 MMHG | HEART RATE: 94 BPM | RESPIRATION RATE: 18 BRPM | TEMPERATURE: 96.6 F

## 2024-09-04 DIAGNOSIS — C21.0 SQUAMOUS CELL CANCER, ANUS (MULTI): Primary | ICD-10-CM

## 2024-09-04 DIAGNOSIS — C21.0 MALIGNANT NEOPLASM OF ANUS, UNSPECIFIED (MULTI): ICD-10-CM

## 2024-09-04 DIAGNOSIS — Z51.0 ENCOUNTER FOR ANTINEOPLASTIC RADIATION THERAPY: ICD-10-CM

## 2024-09-04 LAB
RAD ONC MSQ ACTUAL FRACTIONS DELIVERED: 10
RAD ONC MSQ ACTUAL SESSION DELIVERED DOSE: 180 CGRAY
RAD ONC MSQ ACTUAL TOTAL DOSE: 1800 CGRAY
RAD ONC MSQ ELAPSED DAYS: 14
RAD ONC MSQ LAST DATE: NORMAL
RAD ONC MSQ PRESCRIBED FRACTIONAL DOSE: 180 CGRAY
RAD ONC MSQ PRESCRIBED NUMBER OF FRACTIONS: 28
RAD ONC MSQ PRESCRIBED TECHNIQUE: NORMAL
RAD ONC MSQ PRESCRIBED TOTAL DOSE: 5040 CGRAY
RAD ONC MSQ PRESCRIPTION PATTERN COMMENT: NORMAL
RAD ONC MSQ START DATE: NORMAL
RAD ONC MSQ TREATMENT COURSE NUMBER: 1
RAD ONC MSQ TREATMENT SITE: NORMAL

## 2024-09-04 PROCEDURE — 77386 HC INTENSITY-MODULATED RADIATION THERAPY (IMRT), COMPLEX: CPT | Performed by: STUDENT IN AN ORGANIZED HEALTH CARE EDUCATION/TRAINING PROGRAM

## 2024-09-04 ASSESSMENT — COLUMBIA-SUICIDE SEVERITY RATING SCALE - C-SSRS
1. IN THE PAST MONTH, HAVE YOU WISHED YOU WERE DEAD OR WISHED YOU COULD GO TO SLEEP AND NOT WAKE UP?: NO
2. HAVE YOU ACTUALLY HAD ANY THOUGHTS OF KILLING YOURSELF?: NO
6. HAVE YOU EVER DONE ANYTHING, STARTED TO DO ANYTHING, OR PREPARED TO DO ANYTHING TO END YOUR LIFE?: NO

## 2024-09-04 ASSESSMENT — PATIENT HEALTH QUESTIONNAIRE - PHQ9
SUM OF ALL RESPONSES TO PHQ9 QUESTIONS 1 AND 2: 0
2. FEELING DOWN, DEPRESSED OR HOPELESS: NOT AT ALL
1. LITTLE INTEREST OR PLEASURE IN DOING THINGS: NOT AT ALL

## 2024-09-04 ASSESSMENT — ENCOUNTER SYMPTOMS
DEPRESSION: 0
LOSS OF SENSATION IN FEET: 0
OCCASIONAL FEELINGS OF UNSTEADINESS: 0

## 2024-09-04 ASSESSMENT — PAIN SCALES - GENERAL: PAINLEVEL: 0-NO PAIN

## 2024-09-04 NOTE — PROGRESS NOTES
Radiation Oncology On Treatment Visit    Patient Name:  Raquel Palacios  MRN:  [id number]  :  1955    Referring Provider: No ref. provider found  Primary Care Provider: Jeremias Rubin DO  Care Team: Patient Care Team:  Jeremias Rubin DO as PCP - General (Family Medicine)  Cristian Álvarez MD as Medical Oncologist (Hematology and Oncology)    Date of Service: 2024     Diagnosis:   Specialty Problems          Radiation Oncology Problems    Squamous cell cancer, anus (Multi)         Treatment Summary:  IMRT: Not Applicable Anal canal, Bilateral Inguinal lymph node, Bilateral Pelvis    Treatment Period Technique Fraction Dose Fractions Total Dose   Course 1 2024-2024  (days elapsed: 14)         Anus_Pel_Ing 2024-2024 VMAT 180 / 180 cGy 10 / 28 1800 / 5,040 cGy     SUBJECTIVE: Low energy requiring rest.  Nausea using antiemetics. Hypersensitive tongue.  Tolerating chemo fair.     OBJECTIVE:   Vital Signs:  /78 (BP Location: Left arm, Patient Position: Sitting, BP Cuff Size: Adult long)   Pulse 94   Temp 35.9 °C (96.6 °F) (Temporal)   Resp 18   Wt 94.9 kg (209 lb 3.5 oz)   SpO2 98%   BMI 33.77 kg/m²    Pain Scale: The patient's current pain level was assessed.  They report currently having a pain of 0 out of 10.    Other Pertinent Findings:     Toxicity Assessment          2024    14:08 2024    11:13 2024    11:16   Toxicity Assessment   Treatment Site Pelvis - female Pelvis - female Pelvis - female   Anorexia Grade 0 Grade 1 Grade 1   Anxiety Grade 0 Grade 0 Grade 0   Dehydration Grade 0 Grade 0 Grade 0   Depression Grade 0 Grade 0 Grade 0   Dermatitis Radiation Grade 0 Grade 0 Grade 0   Diarrhea Grade 0 Grade 1       liquid stool this morning 4 times then took immodium' Grade 1       off and on. 1 immodium takes care of it   Fatigue Grade 0 Grade 1 Grade 1   Fibrosis Deep Connective Tissue Grade 0 Grade 0 Grade 0   Fracture Grade 0 Grade 0 Grade 0   Nausea Grade 0  Grade 1 Grade 1   Pain Grade 0 Grade 0 Grade 0   Treatment Related Secondary Malignancy Grade 0 Grade 0 Grade 0   Tumor Pain Grade 0 Grade 0 Grade 0   Vomiting Grade 0 Grade 0 Grade 0   Lower Gastrointestinal Hemorrhage Grade 0 Grade 0 Grade 1       small amount this morning when wiping   Proctitis Grade 0 Grade 0 Grade 0   Cystitis Noninfective Grade 0 Grade 0 Grade 0   Urinary Incontinence Grade 0 Grade 0 Grade 0   Pelvic Pain Grade 0 Grade 0 Grade 0   Pelvic Soft Tissue Necrosis Grade 0 Grade 0 Grade 0   Neuralgia Grade 0 Grade 0 Grade 0   Chronic Kidney Disease Grade 0 Grade 0 Grade 0   Vaginal Dryness Grade 0 Grade 0 Grade 0   Vaginal Fistula Grade 0 Grade 0 Grade 0   Vaginal Hemorrhage Grade 0 Grade 0 Grade 0   Vaginal Stricture Grade 0 Grade 0 Grade 0   Lymphedema Grade 0 Grade 0 Grade 0   Thromboembolic Event Grade 0 Grade 0 Grade 0   Hot Flashes Grade 0 Grade 0 Grade 0        Concurrent systemic therapy: methylPREDNISolone sod succinate (SOLU-Medrol) 40 mg/mL injection 40 mg, 40 mg, intravenous, As needed, 1 of 1 cycle        mitoMYcin (Mutamycin) 20 mg injection in 40 mL, 10 mg/m2 = 20 mg, intravenous, Once, 1 of 1 cycle    Administration: 20 mg (8/21/2024)      Labs:   WBC   Date Value Ref Range Status   08/19/2024 8.4 4.4 - 11.3 x10*3/uL Final   07/24/2024 9.7 4.4 - 11.3 x10*3/uL Final     Hemoglobin   Date Value Ref Range Status   08/19/2024 13.3 12.0 - 16.0 g/dL Final   07/24/2024 13.2 12.0 - 16.0 g/dL Final     Hematocrit   Date Value Ref Range Status   08/19/2024 41.6 36.0 - 46.0 % Final   07/24/2024 40.4 36.0 - 46.0 % Final     Neutrophils Absolute   Date Value Ref Range Status   08/19/2024 5.84 1.20 - 7.70 x10*3/uL Final     Comment:     Percent differential counts (%) should be interpreted in the context of the absolute cell counts (cells/uL).   07/24/2024 6.51 1.20 - 7.70 x10*3/uL Final     Comment:     Percent differential counts (%) should be interpreted in the context of the absolute cell  counts (cells/uL).     Platelets   Date Value Ref Range Status   08/19/2024 370 150 - 450 x10*3/uL Final   07/24/2024 409 150 - 450 x10*3/uL Final     Alkaline Phosphatase   Date Value Ref Range Status   08/19/2024 109 33 - 136 U/L Final   07/24/2024 126 33 - 136 U/L Final     AST   Date Value Ref Range Status   08/19/2024 17 9 - 39 U/L Final   07/24/2024 20 9 - 39 U/L Final     ALT   Date Value Ref Range Status   08/19/2024 22 7 - 45 U/L Final     Comment:     Patients treated with Sulfasalazine may generate falsely decreased results for ALT.   07/24/2024 22 7 - 45 U/L Final     Comment:     Patients treated with Sulfasalazine may generate falsely decreased results for ALT.     Bilirubin, Total   Date Value Ref Range Status   08/19/2024 0.3 0.0 - 1.2 mg/dL Final   07/24/2024 0.4 0.0 - 1.2 mg/dL Final     Glucose   Date Value Ref Range Status   08/19/2024 136 (H) 74 - 99 mg/dL Final   07/24/2024 92 74 - 99 mg/dL Final     Calcium   Date Value Ref Range Status   08/19/2024 9.3 8.6 - 10.3 mg/dL Final   07/24/2024 9.2 8.6 - 10.3 mg/dL Final     Sodium   Date Value Ref Range Status   08/19/2024 139 136 - 145 mmol/L Final   07/24/2024 140 136 - 145 mmol/L Final     Potassium   Date Value Ref Range Status   08/19/2024 3.8 3.5 - 5.3 mmol/L Final   07/24/2024 4.4 3.5 - 5.3 mmol/L Final     Bicarbonate   Date Value Ref Range Status   08/19/2024 28 21 - 32 mmol/L Final   07/24/2024 31 21 - 32 mmol/L Final     Chloride   Date Value Ref Range Status   08/19/2024 103 98 - 107 mmol/L Final   07/24/2024 103 98 - 107 mmol/L Final     Urea Nitrogen   Date Value Ref Range Status   08/19/2024 9 6 - 23 mg/dL Final   07/24/2024 13 6 - 23 mg/dL Final     Creatinine   Date Value Ref Range Status   08/19/2024 0.90 0.50 - 1.05 mg/dL Final   07/24/2024 1.21 (H) 0.50 - 1.05 mg/dL Final         Exam: Resting comfortably in chair.     Assessment / Plan:  The patient is tolerating radiation therapy as anticipated.  Continue per current treatment  plan.       Therapies: Rest as needed for fatigue.  Using Aquaphor will transition to essentials ointment when radiation dermatitis worsens.  Imodium as needed for diarrhea.  Will monitor her oral symptoms while on Xeloda.  Labs to be drawn to monitor blood counts.    Side effects reviewed with patient. Images, chart and labs reviewed.    Jorge Padilla MD

## 2024-09-05 ENCOUNTER — LAB (OUTPATIENT)
Dept: LAB | Facility: CLINIC | Age: 69
End: 2024-09-05
Payer: MEDICARE

## 2024-09-05 ENCOUNTER — SPECIALTY PHARMACY (OUTPATIENT)
Dept: PHARMACY | Facility: CLINIC | Age: 69
End: 2024-09-05

## 2024-09-05 ENCOUNTER — HOSPITAL ENCOUNTER (OUTPATIENT)
Dept: RADIATION ONCOLOGY | Facility: CLINIC | Age: 69
Setting detail: RADIATION/ONCOLOGY SERIES
Discharge: HOME | End: 2024-09-05
Payer: MEDICARE

## 2024-09-05 ENCOUNTER — APPOINTMENT (OUTPATIENT)
Dept: HEMATOLOGY/ONCOLOGY | Facility: HOSPITAL | Age: 69
End: 2024-09-05
Payer: MEDICARE

## 2024-09-05 DIAGNOSIS — C21.0 SQUAMOUS CELL CANCER, ANUS (MULTI): ICD-10-CM

## 2024-09-05 DIAGNOSIS — Z51.0 ENCOUNTER FOR ANTINEOPLASTIC RADIATION THERAPY: ICD-10-CM

## 2024-09-05 DIAGNOSIS — C21.0 MALIGNANT NEOPLASM OF ANUS, UNSPECIFIED (MULTI): ICD-10-CM

## 2024-09-05 LAB
ALBUMIN SERPL BCP-MCNC: 4.1 G/DL (ref 3.4–5)
ALP SERPL-CCNC: 78 U/L (ref 33–136)
ALT SERPL W P-5'-P-CCNC: 14 U/L (ref 7–45)
ANION GAP SERPL CALC-SCNC: 14 MMOL/L (ref 10–20)
AST SERPL W P-5'-P-CCNC: 17 U/L (ref 9–39)
BASOPHILS # BLD AUTO: 0 X10*3/UL (ref 0–0.1)
BASOPHILS NFR BLD AUTO: 0 %
BILIRUB SERPL-MCNC: 0.6 MG/DL (ref 0–1.2)
BUN SERPL-MCNC: 9 MG/DL (ref 6–23)
CALCIUM SERPL-MCNC: 8.7 MG/DL (ref 8.6–10.3)
CHLORIDE SERPL-SCNC: 105 MMOL/L (ref 98–107)
CO2 SERPL-SCNC: 26 MMOL/L (ref 21–32)
CREAT SERPL-MCNC: 0.81 MG/DL (ref 0.5–1.05)
EGFRCR SERPLBLD CKD-EPI 2021: 80 ML/MIN/1.73M*2
EOSINOPHIL # BLD AUTO: 0.05 X10*3/UL (ref 0–0.7)
EOSINOPHIL NFR BLD AUTO: 1.6 %
ERYTHROCYTE [DISTWIDTH] IN BLOOD BY AUTOMATED COUNT: 14.6 % (ref 11.5–14.5)
GLUCOSE SERPL-MCNC: 89 MG/DL (ref 74–99)
HCT VFR BLD AUTO: 35.8 % (ref 36–46)
HGB BLD-MCNC: 11.8 G/DL (ref 12–16)
IMM GRANULOCYTES # BLD AUTO: 0.01 X10*3/UL (ref 0–0.7)
IMM GRANULOCYTES NFR BLD AUTO: 0.3 % (ref 0–0.9)
LYMPHOCYTES # BLD AUTO: 0.64 X10*3/UL (ref 1.2–4.8)
LYMPHOCYTES NFR BLD AUTO: 19.9 %
MCH RBC QN AUTO: 29.3 PG (ref 26–34)
MCHC RBC AUTO-ENTMCNC: 33 G/DL (ref 32–36)
MCV RBC AUTO: 89 FL (ref 80–100)
MONOCYTES # BLD AUTO: 0.39 X10*3/UL (ref 0.1–1)
MONOCYTES NFR BLD AUTO: 12.1 %
NEUTROPHILS # BLD AUTO: 2.12 X10*3/UL (ref 1.2–7.7)
NEUTROPHILS NFR BLD AUTO: 66.1 %
NRBC BLD-RTO: 0 /100 WBCS (ref 0–0)
PLATELET # BLD AUTO: 110 X10*3/UL (ref 150–450)
POTASSIUM SERPL-SCNC: 3.7 MMOL/L (ref 3.5–5.3)
PROT SERPL-MCNC: 6.4 G/DL (ref 6.4–8.2)
RAD ONC MSQ ACTUAL FRACTIONS DELIVERED: 11
RAD ONC MSQ ACTUAL SESSION DELIVERED DOSE: 180 CGRAY
RAD ONC MSQ ACTUAL TOTAL DOSE: 1980 CGRAY
RAD ONC MSQ ELAPSED DAYS: 15
RAD ONC MSQ LAST DATE: NORMAL
RAD ONC MSQ PRESCRIBED FRACTIONAL DOSE: 180 CGRAY
RAD ONC MSQ PRESCRIBED NUMBER OF FRACTIONS: 28
RAD ONC MSQ PRESCRIBED TECHNIQUE: NORMAL
RAD ONC MSQ PRESCRIBED TOTAL DOSE: 5040 CGRAY
RAD ONC MSQ PRESCRIPTION PATTERN COMMENT: NORMAL
RAD ONC MSQ START DATE: NORMAL
RAD ONC MSQ TREATMENT COURSE NUMBER: 1
RAD ONC MSQ TREATMENT SITE: NORMAL
RBC # BLD AUTO: 4.03 X10*6/UL (ref 4–5.2)
RBC MORPH BLD: NORMAL
SODIUM SERPL-SCNC: 141 MMOL/L (ref 136–145)
WBC # BLD AUTO: 3.2 X10*3/UL (ref 4.4–11.3)

## 2024-09-05 PROCEDURE — 85025 COMPLETE CBC W/AUTO DIFF WBC: CPT

## 2024-09-05 PROCEDURE — 77386 HC INTENSITY-MODULATED RADIATION THERAPY (IMRT), COMPLEX: CPT | Performed by: STUDENT IN AN ORGANIZED HEALTH CARE EDUCATION/TRAINING PROGRAM

## 2024-09-05 PROCEDURE — 36415 COLL VENOUS BLD VENIPUNCTURE: CPT

## 2024-09-05 PROCEDURE — 80053 COMPREHEN METABOLIC PANEL: CPT

## 2024-09-06 ENCOUNTER — SPECIALTY PHARMACY (OUTPATIENT)
Dept: PHARMACY | Facility: CLINIC | Age: 69
End: 2024-09-06

## 2024-09-06 ENCOUNTER — HOSPITAL ENCOUNTER (OUTPATIENT)
Dept: RADIATION ONCOLOGY | Facility: CLINIC | Age: 69
Setting detail: RADIATION/ONCOLOGY SERIES
Discharge: HOME | End: 2024-09-06
Payer: MEDICARE

## 2024-09-06 DIAGNOSIS — C21.0 SQUAMOUS CELL CANCER, ANUS (MULTI): ICD-10-CM

## 2024-09-06 DIAGNOSIS — C21.0 MALIGNANT NEOPLASM OF ANUS, UNSPECIFIED (MULTI): ICD-10-CM

## 2024-09-06 DIAGNOSIS — Z51.0 ENCOUNTER FOR ANTINEOPLASTIC RADIATION THERAPY: ICD-10-CM

## 2024-09-06 LAB
RAD ONC MSQ ACTUAL FRACTIONS DELIVERED: 12
RAD ONC MSQ ACTUAL SESSION DELIVERED DOSE: 180 CGRAY
RAD ONC MSQ ACTUAL TOTAL DOSE: 2160 CGRAY
RAD ONC MSQ ELAPSED DAYS: 16
RAD ONC MSQ LAST DATE: NORMAL
RAD ONC MSQ PRESCRIBED FRACTIONAL DOSE: 180 CGRAY
RAD ONC MSQ PRESCRIBED NUMBER OF FRACTIONS: 28
RAD ONC MSQ PRESCRIBED TECHNIQUE: NORMAL
RAD ONC MSQ PRESCRIBED TOTAL DOSE: 5040 CGRAY
RAD ONC MSQ PRESCRIPTION PATTERN COMMENT: NORMAL
RAD ONC MSQ START DATE: NORMAL
RAD ONC MSQ TREATMENT COURSE NUMBER: 1
RAD ONC MSQ TREATMENT SITE: NORMAL

## 2024-09-06 PROCEDURE — 77386 HC INTENSITY-MODULATED RADIATION THERAPY (IMRT), COMPLEX: CPT | Performed by: STUDENT IN AN ORGANIZED HEALTH CARE EDUCATION/TRAINING PROGRAM

## 2024-09-06 RX ORDER — CAPECITABINE 150 MG/1
1200 TABLET, FILM COATED ORAL 2 TIMES DAILY
Qty: 480 TABLET | Refills: 0 | Status: SHIPPED | OUTPATIENT
Start: 2024-09-06 | End: 2024-10-06

## 2024-09-06 RX ORDER — CAPECITABINE 500 MG/1
500 TABLET, FILM COATED ORAL 2 TIMES DAILY
Qty: 60 TABLET | Refills: 0 | Status: SHIPPED | OUTPATIENT
Start: 2024-09-06 | End: 2024-10-06

## 2024-09-09 ENCOUNTER — HOSPITAL ENCOUNTER (OUTPATIENT)
Dept: RADIATION ONCOLOGY | Facility: CLINIC | Age: 69
Setting detail: RADIATION/ONCOLOGY SERIES
Discharge: HOME | End: 2024-09-09
Payer: MEDICARE

## 2024-09-09 DIAGNOSIS — Z51.0 ENCOUNTER FOR ANTINEOPLASTIC RADIATION THERAPY: ICD-10-CM

## 2024-09-09 DIAGNOSIS — C21.0 MALIGNANT NEOPLASM OF ANUS, UNSPECIFIED (MULTI): ICD-10-CM

## 2024-09-09 LAB
RAD ONC MSQ ACTUAL FRACTIONS DELIVERED: 13
RAD ONC MSQ ACTUAL SESSION DELIVERED DOSE: 180 CGRAY
RAD ONC MSQ ACTUAL TOTAL DOSE: 2340 CGRAY
RAD ONC MSQ ELAPSED DAYS: 19
RAD ONC MSQ LAST DATE: NORMAL
RAD ONC MSQ PRESCRIBED FRACTIONAL DOSE: 180 CGRAY
RAD ONC MSQ PRESCRIBED NUMBER OF FRACTIONS: 28
RAD ONC MSQ PRESCRIBED TECHNIQUE: NORMAL
RAD ONC MSQ PRESCRIBED TOTAL DOSE: 5040 CGRAY
RAD ONC MSQ PRESCRIPTION PATTERN COMMENT: NORMAL
RAD ONC MSQ START DATE: NORMAL
RAD ONC MSQ TREATMENT COURSE NUMBER: 1
RAD ONC MSQ TREATMENT SITE: NORMAL

## 2024-09-09 PROCEDURE — 77386 HC INTENSITY-MODULATED RADIATION THERAPY (IMRT), COMPLEX: CPT | Performed by: STUDENT IN AN ORGANIZED HEALTH CARE EDUCATION/TRAINING PROGRAM

## 2024-09-10 ENCOUNTER — HOSPITAL ENCOUNTER (OUTPATIENT)
Dept: RADIATION ONCOLOGY | Facility: CLINIC | Age: 69
Setting detail: RADIATION/ONCOLOGY SERIES
Discharge: HOME | End: 2024-09-10
Payer: MEDICARE

## 2024-09-10 DIAGNOSIS — Z51.0 ENCOUNTER FOR ANTINEOPLASTIC RADIATION THERAPY: ICD-10-CM

## 2024-09-10 DIAGNOSIS — C21.0 MALIGNANT NEOPLASM OF ANUS, UNSPECIFIED (MULTI): ICD-10-CM

## 2024-09-10 LAB
RAD ONC MSQ ACTUAL FRACTIONS DELIVERED: 14
RAD ONC MSQ ACTUAL SESSION DELIVERED DOSE: 180 CGRAY
RAD ONC MSQ ACTUAL TOTAL DOSE: 2520 CGRAY
RAD ONC MSQ ELAPSED DAYS: 20
RAD ONC MSQ LAST DATE: NORMAL
RAD ONC MSQ PRESCRIBED FRACTIONAL DOSE: 180 CGRAY
RAD ONC MSQ PRESCRIBED NUMBER OF FRACTIONS: 28
RAD ONC MSQ PRESCRIBED TECHNIQUE: NORMAL
RAD ONC MSQ PRESCRIBED TOTAL DOSE: 5040 CGRAY
RAD ONC MSQ PRESCRIPTION PATTERN COMMENT: NORMAL
RAD ONC MSQ START DATE: NORMAL
RAD ONC MSQ TREATMENT COURSE NUMBER: 1
RAD ONC MSQ TREATMENT SITE: NORMAL

## 2024-09-10 PROCEDURE — 77336 RADIATION PHYSICS CONSULT: CPT | Performed by: STUDENT IN AN ORGANIZED HEALTH CARE EDUCATION/TRAINING PROGRAM

## 2024-09-10 PROCEDURE — 77386 HC INTENSITY-MODULATED RADIATION THERAPY (IMRT), COMPLEX: CPT | Performed by: STUDENT IN AN ORGANIZED HEALTH CARE EDUCATION/TRAINING PROGRAM

## 2024-09-11 ENCOUNTER — HOSPITAL ENCOUNTER (OUTPATIENT)
Dept: RADIATION ONCOLOGY | Facility: CLINIC | Age: 69
Setting detail: RADIATION/ONCOLOGY SERIES
Discharge: HOME | End: 2024-09-11
Payer: MEDICARE

## 2024-09-11 ENCOUNTER — RADIATION ONCOLOGY OTV (OUTPATIENT)
Dept: RADIATION ONCOLOGY | Facility: CLINIC | Age: 69
End: 2024-09-11
Payer: MEDICARE

## 2024-09-11 VITALS
OXYGEN SATURATION: 97 % | DIASTOLIC BLOOD PRESSURE: 81 MMHG | RESPIRATION RATE: 18 BRPM | WEIGHT: 206.57 LBS | BODY MASS INDEX: 33.34 KG/M2 | TEMPERATURE: 96.3 F | HEART RATE: 96 BPM | SYSTOLIC BLOOD PRESSURE: 136 MMHG

## 2024-09-11 DIAGNOSIS — Z51.0 ENCOUNTER FOR ANTINEOPLASTIC RADIATION THERAPY: ICD-10-CM

## 2024-09-11 DIAGNOSIS — C21.0 MALIGNANT NEOPLASM OF ANUS, UNSPECIFIED (MULTI): ICD-10-CM

## 2024-09-11 LAB
RAD ONC MSQ ACTUAL FRACTIONS DELIVERED: 15
RAD ONC MSQ ACTUAL SESSION DELIVERED DOSE: 180 CGRAY
RAD ONC MSQ ACTUAL TOTAL DOSE: 2700 CGRAY
RAD ONC MSQ ELAPSED DAYS: 21
RAD ONC MSQ LAST DATE: NORMAL
RAD ONC MSQ PRESCRIBED FRACTIONAL DOSE: 180 CGRAY
RAD ONC MSQ PRESCRIBED NUMBER OF FRACTIONS: 28
RAD ONC MSQ PRESCRIBED TECHNIQUE: NORMAL
RAD ONC MSQ PRESCRIBED TOTAL DOSE: 5040 CGRAY
RAD ONC MSQ PRESCRIPTION PATTERN COMMENT: NORMAL
RAD ONC MSQ START DATE: NORMAL
RAD ONC MSQ TREATMENT COURSE NUMBER: 1
RAD ONC MSQ TREATMENT SITE: NORMAL

## 2024-09-11 PROCEDURE — 77386 HC INTENSITY-MODULATED RADIATION THERAPY (IMRT), COMPLEX: CPT | Performed by: STUDENT IN AN ORGANIZED HEALTH CARE EDUCATION/TRAINING PROGRAM

## 2024-09-11 ASSESSMENT — ENCOUNTER SYMPTOMS
OCCASIONAL FEELINGS OF UNSTEADINESS: 0
LOSS OF SENSATION IN FEET: 0
DEPRESSION: 0

## 2024-09-11 ASSESSMENT — PAIN SCALES - GENERAL: PAINLEVEL: 5

## 2024-09-11 NOTE — PROGRESS NOTES
Radiation Oncology On Treatment Visit    Patient Name:  Raquel Palacios  MRN:  [id number]  :  1955    Referring Provider: No ref. provider found  Primary Care Provider: Jeremias Rubin DO  Care Team: Patient Care Team:  Jeremias Rubin DO as PCP - General (Family Medicine)  Cristian Álvarez MD as Medical Oncologist (Hematology and Oncology)    Date of Service: 2024     Diagnosis:   Specialty Problems          Radiation Oncology Problems    Squamous cell cancer, anus (Multi)         Treatment Summary:  IMRT: Not Applicable Anal canal, Bilateral Inguinal lymph node, Bilateral Pelvis    Treatment Period Technique Fraction Dose Fractions Total Dose   Course 1 2024-2024  (days elapsed: )         Anus_Pel_Ing 2024-2024 VMAT 180 / 180 cGy 15 / 28 2700 / 5,040 cGy     SUBJECTIVE: Having 3-4 looser bowel movements, rarely using Imodium.  Following low fiber diet.  Nausea for which the patient is using antiemetics as needed.  Scant bleeding per rectum with wiping.  Using Aquaphor and essentials ointment for skin moisturization and dermatitis.      OBJECTIVE:   Vital Signs:  /81 (BP Location: Left arm, Patient Position: Sitting, BP Cuff Size: Large adult)   Pulse 96   Temp 35.7 °C (96.3 °F) (Temporal)   Resp 18   Wt 93.7 kg (206 lb 9.1 oz)   SpO2 97%   BMI 33.34 kg/m²     Other Pertinent Findings:     Toxicity Assessment          2024    14:08 2024    11:13 2024    11:16 2024    11:07   Toxicity Assessment   Treatment Site Pelvis - female Pelvis - female Pelvis - female Pelvis - female   Anorexia Grade 0 Grade 1 Grade 1 Grade 1   Anxiety Grade 0 Grade 0 Grade 0 Grade 0   Dehydration Grade 0 Grade 0 Grade 0 Grade 0   Depression Grade 0 Grade 0 Grade 0 Grade 0   Dermatitis Radiation Grade 0 Grade 0 Grade 0    Diarrhea Grade 0 Grade 1       liquid stool this morning 4 times then took immodium' Grade 1       off and on. 1 immodium takes care of it Grade 1       1  episode of diarrhea this morning   Fatigue Grade 0 Grade 1 Grade 1 Grade 1   Fibrosis Deep Connective Tissue Grade 0 Grade 0 Grade 0 Grade 0   Fracture Grade 0 Grade 0 Grade 0 Grade 0   Nausea Grade 0 Grade 1 Grade 1 Grade 0   Pain Grade 0 Grade 0 Grade 0 Grade 1       discomfort in lower abdomen   Treatment Related Secondary Malignancy Grade 0 Grade 0 Grade 0 Grade 0   Tumor Pain Grade 0 Grade 0 Grade 0 Grade 0   Vomiting Grade 0 Grade 0 Grade 0 Grade 0   Lower Gastrointestinal Hemorrhage Grade 0 Grade 0 Grade 1       small amount this morning when wiping Grade 0       scant amount when wiping   Proctitis Grade 0 Grade 0 Grade 0 Grade 0   Cystitis Noninfective Grade 0 Grade 0 Grade 0 Grade 0   Urinary Incontinence Grade 0 Grade 0 Grade 0 Grade 0   Pelvic Pain Grade 0 Grade 0 Grade 0 Grade 0   Pelvic Soft Tissue Necrosis Grade 0 Grade 0 Grade 0 Grade 0   Neuralgia Grade 0 Grade 0 Grade 0 Grade 0   Chronic Kidney Disease Grade 0 Grade 0 Grade 0 Grade 0   Vaginal Dryness Grade 0 Grade 0 Grade 0 Grade 0   Vaginal Fistula Grade 0 Grade 0 Grade 0 Grade 0   Vaginal Hemorrhage Grade 0 Grade 0 Grade 0 Grade 0   Vaginal Stricture Grade 0 Grade 0 Grade 0 Grade 0   Lymphedema Grade 0 Grade 0 Grade 0 Grade 0   Thromboembolic Event Grade 0 Grade 0 Grade 0 Grade 0   Hot Flashes Grade 0 Grade 0 Grade 0 Grade 0        Concurrent systemic therapy: methylPREDNISolone sod succinate (SOLU-Medrol) 40 mg/mL injection 40 mg, 40 mg, intravenous, As needed, 1 of 1 cycle        mitoMYcin (Mutamycin) 20 mg injection in 40 mL, 10 mg/m2 = 20 mg, intravenous, Once, 1 of 1 cycle    Administration: 20 mg (8/21/2024)    Xeloda orally    Labs:   WBC   Date Value Ref Range Status   09/05/2024 3.2 (L) 4.4 - 11.3 x10*3/uL Final   08/19/2024 8.4 4.4 - 11.3 x10*3/uL Final     Hemoglobin   Date Value Ref Range Status   09/05/2024 11.8 (L) 12.0 - 16.0 g/dL Final   08/19/2024 13.3 12.0 - 16.0 g/dL Final     Hematocrit   Date Value Ref Range Status    09/05/2024 35.8 (L) 36.0 - 46.0 % Final   08/19/2024 41.6 36.0 - 46.0 % Final     Neutrophils Absolute   Date Value Ref Range Status   09/05/2024 2.12 1.20 - 7.70 x10*3/uL Final     Comment:     Percent differential counts (%) should be interpreted in the context of the absolute cell counts (cells/uL).   08/19/2024 5.84 1.20 - 7.70 x10*3/uL Final     Comment:     Percent differential counts (%) should be interpreted in the context of the absolute cell counts (cells/uL).     Platelets   Date Value Ref Range Status   09/05/2024 110 (L) 150 - 450 x10*3/uL Final     Comment:     Platelet count verified by smear review.   08/19/2024 370 150 - 450 x10*3/uL Final     Alkaline Phosphatase   Date Value Ref Range Status   09/05/2024 78 33 - 136 U/L Final   08/19/2024 109 33 - 136 U/L Final     AST   Date Value Ref Range Status   09/05/2024 17 9 - 39 U/L Final   08/19/2024 17 9 - 39 U/L Final     ALT   Date Value Ref Range Status   09/05/2024 14 7 - 45 U/L Final     Comment:     Patients treated with Sulfasalazine may generate falsely decreased results for ALT.   08/19/2024 22 7 - 45 U/L Final     Comment:     Patients treated with Sulfasalazine may generate falsely decreased results for ALT.     Bilirubin, Total   Date Value Ref Range Status   09/05/2024 0.6 0.0 - 1.2 mg/dL Final   08/19/2024 0.3 0.0 - 1.2 mg/dL Final     Glucose   Date Value Ref Range Status   09/05/2024 89 74 - 99 mg/dL Final   08/19/2024 136 (H) 74 - 99 mg/dL Final     Calcium   Date Value Ref Range Status   09/05/2024 8.7 8.6 - 10.3 mg/dL Final   08/19/2024 9.3 8.6 - 10.3 mg/dL Final     Sodium   Date Value Ref Range Status   09/05/2024 141 136 - 145 mmol/L Final   08/19/2024 139 136 - 145 mmol/L Final     Potassium   Date Value Ref Range Status   09/05/2024 3.7 3.5 - 5.3 mmol/L Final   08/19/2024 3.8 3.5 - 5.3 mmol/L Final     Bicarbonate   Date Value Ref Range Status   09/05/2024 26 21 - 32 mmol/L Final   08/19/2024 28 21 - 32 mmol/L Final      Chloride   Date Value Ref Range Status   09/05/2024 105 98 - 107 mmol/L Final   08/19/2024 103 98 - 107 mmol/L Final     Urea Nitrogen   Date Value Ref Range Status   09/05/2024 9 6 - 23 mg/dL Final   08/19/2024 9 6 - 23 mg/dL Final     Creatinine   Date Value Ref Range Status   09/05/2024 0.81 0.50 - 1.05 mg/dL Final   08/19/2024 0.90 0.50 - 1.05 mg/dL Final         Exam: Resting comfortably in chair.     Assessment / Plan:  The patient is tolerating radiation therapy as anticipated.  Continue per current treatment plan.       Therapies: Encourage continue use of essentials ointment and Aquaphor as needed for dermatitis.  Discussed possible initiation of tramadol or oxycodone as needed for pain if perianal discomfort and skin dermatitis worsens.  Antiemetics as needed for nausea.  Imodium as needed for diarrhea.  The patient is undergoing low fiber diet.    Side effects reviewed with patient. Images, chart and labs reviewed.    Jorge Padilla MD

## 2024-09-12 ENCOUNTER — HOSPITAL ENCOUNTER (OUTPATIENT)
Dept: RADIATION ONCOLOGY | Facility: CLINIC | Age: 69
Setting detail: RADIATION/ONCOLOGY SERIES
Discharge: HOME | End: 2024-09-12
Payer: MEDICARE

## 2024-09-12 DIAGNOSIS — Z51.0 ENCOUNTER FOR ANTINEOPLASTIC RADIATION THERAPY: ICD-10-CM

## 2024-09-12 DIAGNOSIS — C21.0 MALIGNANT NEOPLASM OF ANUS, UNSPECIFIED (MULTI): ICD-10-CM

## 2024-09-12 LAB
RAD ONC MSQ ACTUAL FRACTIONS DELIVERED: 16
RAD ONC MSQ ACTUAL SESSION DELIVERED DOSE: 180 CGRAY
RAD ONC MSQ ACTUAL TOTAL DOSE: 2880 CGRAY
RAD ONC MSQ ELAPSED DAYS: 22
RAD ONC MSQ LAST DATE: NORMAL
RAD ONC MSQ PRESCRIBED FRACTIONAL DOSE: 180 CGRAY
RAD ONC MSQ PRESCRIBED NUMBER OF FRACTIONS: 28
RAD ONC MSQ PRESCRIBED TECHNIQUE: NORMAL
RAD ONC MSQ PRESCRIBED TOTAL DOSE: 5040 CGRAY
RAD ONC MSQ PRESCRIPTION PATTERN COMMENT: NORMAL
RAD ONC MSQ START DATE: NORMAL
RAD ONC MSQ TREATMENT COURSE NUMBER: 1
RAD ONC MSQ TREATMENT SITE: NORMAL

## 2024-09-12 PROCEDURE — 77386 HC INTENSITY-MODULATED RADIATION THERAPY (IMRT), COMPLEX: CPT | Performed by: STUDENT IN AN ORGANIZED HEALTH CARE EDUCATION/TRAINING PROGRAM

## 2024-09-13 ENCOUNTER — HOSPITAL ENCOUNTER (OUTPATIENT)
Dept: RADIATION ONCOLOGY | Facility: CLINIC | Age: 69
Setting detail: RADIATION/ONCOLOGY SERIES
Discharge: HOME | End: 2024-09-13
Payer: MEDICARE

## 2024-09-13 DIAGNOSIS — Z51.0 ENCOUNTER FOR ANTINEOPLASTIC RADIATION THERAPY: ICD-10-CM

## 2024-09-13 DIAGNOSIS — C21.0 MALIGNANT NEOPLASM OF ANUS, UNSPECIFIED: ICD-10-CM

## 2024-09-13 LAB
RAD ONC MSQ ACTUAL FRACTIONS DELIVERED: 17
RAD ONC MSQ ACTUAL SESSION DELIVERED DOSE: 180 CGRAY
RAD ONC MSQ ACTUAL TOTAL DOSE: 3060 CGRAY
RAD ONC MSQ ELAPSED DAYS: 23
RAD ONC MSQ LAST DATE: NORMAL
RAD ONC MSQ PRESCRIBED FRACTIONAL DOSE: 180 CGRAY
RAD ONC MSQ PRESCRIBED NUMBER OF FRACTIONS: 28
RAD ONC MSQ PRESCRIBED TECHNIQUE: NORMAL
RAD ONC MSQ PRESCRIBED TOTAL DOSE: 5040 CGRAY
RAD ONC MSQ PRESCRIPTION PATTERN COMMENT: NORMAL
RAD ONC MSQ START DATE: NORMAL
RAD ONC MSQ TREATMENT COURSE NUMBER: 1
RAD ONC MSQ TREATMENT SITE: NORMAL

## 2024-09-13 PROCEDURE — 77386 HC INTENSITY-MODULATED RADIATION THERAPY (IMRT), COMPLEX: CPT | Performed by: STUDENT IN AN ORGANIZED HEALTH CARE EDUCATION/TRAINING PROGRAM

## 2024-09-16 ENCOUNTER — SPECIALTY PHARMACY (OUTPATIENT)
Dept: PHARMACY | Facility: CLINIC | Age: 69
End: 2024-09-16

## 2024-09-16 ENCOUNTER — HOSPITAL ENCOUNTER (OUTPATIENT)
Dept: RADIATION ONCOLOGY | Facility: CLINIC | Age: 69
Setting detail: RADIATION/ONCOLOGY SERIES
Discharge: HOME | End: 2024-09-16
Payer: MEDICARE

## 2024-09-16 DIAGNOSIS — Z51.0 ENCOUNTER FOR ANTINEOPLASTIC RADIATION THERAPY: ICD-10-CM

## 2024-09-16 DIAGNOSIS — C21.0 MALIGNANT NEOPLASM OF ANUS, UNSPECIFIED: ICD-10-CM

## 2024-09-16 LAB
RAD ONC MSQ ACTUAL FRACTIONS DELIVERED: 18
RAD ONC MSQ ACTUAL SESSION DELIVERED DOSE: 180 CGRAY
RAD ONC MSQ ACTUAL TOTAL DOSE: 3240 CGRAY
RAD ONC MSQ ELAPSED DAYS: 26
RAD ONC MSQ LAST DATE: NORMAL
RAD ONC MSQ PRESCRIBED FRACTIONAL DOSE: 180 CGRAY
RAD ONC MSQ PRESCRIBED NUMBER OF FRACTIONS: 28
RAD ONC MSQ PRESCRIBED TECHNIQUE: NORMAL
RAD ONC MSQ PRESCRIBED TOTAL DOSE: 5040 CGRAY
RAD ONC MSQ PRESCRIPTION PATTERN COMMENT: NORMAL
RAD ONC MSQ START DATE: NORMAL
RAD ONC MSQ TREATMENT COURSE NUMBER: 1
RAD ONC MSQ TREATMENT SITE: NORMAL

## 2024-09-16 PROCEDURE — 77386 HC INTENSITY-MODULATED RADIATION THERAPY (IMRT), COMPLEX: CPT | Performed by: STUDENT IN AN ORGANIZED HEALTH CARE EDUCATION/TRAINING PROGRAM

## 2024-09-17 ENCOUNTER — HOSPITAL ENCOUNTER (OUTPATIENT)
Dept: RADIATION ONCOLOGY | Facility: CLINIC | Age: 69
Setting detail: RADIATION/ONCOLOGY SERIES
Discharge: HOME | End: 2024-09-17
Payer: MEDICARE

## 2024-09-17 DIAGNOSIS — Z51.0 ENCOUNTER FOR ANTINEOPLASTIC RADIATION THERAPY: ICD-10-CM

## 2024-09-17 DIAGNOSIS — C21.0 MALIGNANT NEOPLASM OF ANUS, UNSPECIFIED: ICD-10-CM

## 2024-09-17 LAB
RAD ONC MSQ ACTUAL FRACTIONS DELIVERED: 19
RAD ONC MSQ ACTUAL SESSION DELIVERED DOSE: 180 CGRAY
RAD ONC MSQ ACTUAL TOTAL DOSE: 3420 CGRAY
RAD ONC MSQ ELAPSED DAYS: 27
RAD ONC MSQ LAST DATE: NORMAL
RAD ONC MSQ PRESCRIBED FRACTIONAL DOSE: 180 CGRAY
RAD ONC MSQ PRESCRIBED NUMBER OF FRACTIONS: 28
RAD ONC MSQ PRESCRIBED TECHNIQUE: NORMAL
RAD ONC MSQ PRESCRIBED TOTAL DOSE: 5040 CGRAY
RAD ONC MSQ PRESCRIPTION PATTERN COMMENT: NORMAL
RAD ONC MSQ START DATE: NORMAL
RAD ONC MSQ TREATMENT COURSE NUMBER: 1
RAD ONC MSQ TREATMENT SITE: NORMAL

## 2024-09-17 PROCEDURE — 77336 RADIATION PHYSICS CONSULT: CPT | Performed by: STUDENT IN AN ORGANIZED HEALTH CARE EDUCATION/TRAINING PROGRAM

## 2024-09-17 PROCEDURE — 77386 HC INTENSITY-MODULATED RADIATION THERAPY (IMRT), COMPLEX: CPT | Performed by: STUDENT IN AN ORGANIZED HEALTH CARE EDUCATION/TRAINING PROGRAM

## 2024-09-17 NOTE — PROGRESS NOTES
Treatment Pre-Review for Date of Service: 9/17/24    Diagnosis:  Squamous cell cancer, anus (Multi), Clinical: Stage I (cT1, cN0, cM0)      Regimen: Mitomycin Day 1 + 29    Current Cycle/Day: Cycle 1 Day 29   Treatment Date Appropriate: Yes; Last Treatment: 8/21/24  Date change required: none    Dose or Regimen Modifications: None noted during pre-review    Med Rec/Drug Interactions: None noted during pre-review    Growth Factor: none    Financial Clearance/Authorization: Yes    Echo:  No results found for this or any previous visit from the past 1095 days.    Lifetime Dose Tracking    mitomycin: 9.479 mg/m2 (20 mg) = 15.8 % of the maximum lifetime dose of 60 mg/m2     Comments: none    I Robert Cary, PharmD hereby acknowledge that the treatment plan indicated above has been verified for correctness to the best of my ability on 9/17/2024 at 2:40 PM.

## 2024-09-18 ENCOUNTER — RADIATION ONCOLOGY OTV (OUTPATIENT)
Dept: RADIATION ONCOLOGY | Facility: CLINIC | Age: 69
End: 2024-09-18

## 2024-09-18 ENCOUNTER — OFFICE VISIT (OUTPATIENT)
Dept: HEMATOLOGY/ONCOLOGY | Facility: HOSPITAL | Age: 69
End: 2024-09-18
Payer: MEDICARE

## 2024-09-18 ENCOUNTER — INFUSION (OUTPATIENT)
Dept: HEMATOLOGY/ONCOLOGY | Facility: HOSPITAL | Age: 69
End: 2024-09-18
Payer: MEDICARE

## 2024-09-18 ENCOUNTER — HOSPITAL ENCOUNTER (OUTPATIENT)
Dept: RADIATION ONCOLOGY | Facility: CLINIC | Age: 69
Setting detail: RADIATION/ONCOLOGY SERIES
Discharge: HOME | End: 2024-09-18
Payer: MEDICARE

## 2024-09-18 VITALS
RESPIRATION RATE: 16 BRPM | BODY MASS INDEX: 32.83 KG/M2 | WEIGHT: 204.26 LBS | HEART RATE: 88 BPM | OXYGEN SATURATION: 97 % | SYSTOLIC BLOOD PRESSURE: 125 MMHG | TEMPERATURE: 97 F | HEIGHT: 66 IN | DIASTOLIC BLOOD PRESSURE: 77 MMHG

## 2024-09-18 VITALS
TEMPERATURE: 97.2 F | SYSTOLIC BLOOD PRESSURE: 130 MMHG | DIASTOLIC BLOOD PRESSURE: 69 MMHG | RESPIRATION RATE: 17 BRPM | HEART RATE: 67 BPM | OXYGEN SATURATION: 98 %

## 2024-09-18 VITALS
RESPIRATION RATE: 18 BRPM | SYSTOLIC BLOOD PRESSURE: 144 MMHG | HEART RATE: 97 BPM | DIASTOLIC BLOOD PRESSURE: 83 MMHG | WEIGHT: 205.25 LBS | TEMPERATURE: 97.2 F | BODY MASS INDEX: 33.13 KG/M2 | OXYGEN SATURATION: 97 %

## 2024-09-18 DIAGNOSIS — C21.0 SQUAMOUS CELL CANCER, ANUS (MULTI): ICD-10-CM

## 2024-09-18 DIAGNOSIS — C21.0 MALIGNANT NEOPLASM OF ANUS, UNSPECIFIED: ICD-10-CM

## 2024-09-18 DIAGNOSIS — Z51.0 ENCOUNTER FOR ANTINEOPLASTIC RADIATION THERAPY: ICD-10-CM

## 2024-09-18 DIAGNOSIS — C21.0 SQUAMOUS CELL CANCER, ANUS (MULTI): Primary | ICD-10-CM

## 2024-09-18 LAB
ALBUMIN SERPL BCP-MCNC: 4.1 G/DL (ref 3.4–5)
ALP SERPL-CCNC: 94 U/L (ref 33–136)
ALT SERPL W P-5'-P-CCNC: 15 U/L (ref 7–45)
ANION GAP SERPL CALC-SCNC: 11 MMOL/L (ref 10–20)
AST SERPL W P-5'-P-CCNC: 21 U/L (ref 9–39)
BASOPHILS # BLD AUTO: 0.01 X10*3/UL (ref 0–0.1)
BASOPHILS NFR BLD AUTO: 0.3 %
BILIRUB SERPL-MCNC: 0.7 MG/DL (ref 0–1.2)
BUN SERPL-MCNC: 13 MG/DL (ref 6–23)
CALCIUM SERPL-MCNC: 9 MG/DL (ref 8.6–10.3)
CHLORIDE SERPL-SCNC: 103 MMOL/L (ref 98–107)
CO2 SERPL-SCNC: 28 MMOL/L (ref 21–32)
CREAT SERPL-MCNC: 0.97 MG/DL (ref 0.5–1.05)
EGFRCR SERPLBLD CKD-EPI 2021: 64 ML/MIN/1.73M*2
EOSINOPHIL # BLD AUTO: 0.07 X10*3/UL (ref 0–0.7)
EOSINOPHIL NFR BLD AUTO: 1.9 %
ERYTHROCYTE [DISTWIDTH] IN BLOOD BY AUTOMATED COUNT: 16.8 % (ref 11.5–14.5)
GLUCOSE SERPL-MCNC: 101 MG/DL (ref 74–99)
HCT VFR BLD AUTO: 34.8 % (ref 36–46)
HGB BLD-MCNC: 11.4 G/DL (ref 12–16)
IMM GRANULOCYTES # BLD AUTO: 0.02 X10*3/UL (ref 0–0.7)
IMM GRANULOCYTES NFR BLD AUTO: 0.5 % (ref 0–0.9)
LYMPHOCYTES # BLD AUTO: 0.33 X10*3/UL (ref 1.2–4.8)
LYMPHOCYTES NFR BLD AUTO: 8.7 %
MCH RBC QN AUTO: 29.7 PG (ref 26–34)
MCHC RBC AUTO-ENTMCNC: 32.8 G/DL (ref 32–36)
MCV RBC AUTO: 91 FL (ref 80–100)
MONOCYTES # BLD AUTO: 0.38 X10*3/UL (ref 0.1–1)
MONOCYTES NFR BLD AUTO: 10.1 %
NEUTROPHILS # BLD AUTO: 2.97 X10*3/UL (ref 1.2–7.7)
NEUTROPHILS NFR BLD AUTO: 78.5 %
NRBC BLD-RTO: 0 /100 WBCS (ref 0–0)
PLATELET # BLD AUTO: 170 X10*3/UL (ref 150–450)
POTASSIUM SERPL-SCNC: 3.7 MMOL/L (ref 3.5–5.3)
PROT SERPL-MCNC: 6.6 G/DL (ref 6.4–8.2)
RAD ONC MSQ ACTUAL FRACTIONS DELIVERED: 20
RAD ONC MSQ ACTUAL SESSION DELIVERED DOSE: 180 CGRAY
RAD ONC MSQ ACTUAL TOTAL DOSE: 3600 CGRAY
RAD ONC MSQ ELAPSED DAYS: 28
RAD ONC MSQ LAST DATE: NORMAL
RAD ONC MSQ PRESCRIBED FRACTIONAL DOSE: 180 CGRAY
RAD ONC MSQ PRESCRIBED NUMBER OF FRACTIONS: 28
RAD ONC MSQ PRESCRIBED TECHNIQUE: NORMAL
RAD ONC MSQ PRESCRIBED TOTAL DOSE: 5040 CGRAY
RAD ONC MSQ PRESCRIPTION PATTERN COMMENT: NORMAL
RAD ONC MSQ START DATE: NORMAL
RAD ONC MSQ TREATMENT COURSE NUMBER: 1
RAD ONC MSQ TREATMENT SITE: NORMAL
RBC # BLD AUTO: 3.84 X10*6/UL (ref 4–5.2)
SODIUM SERPL-SCNC: 138 MMOL/L (ref 136–145)
WBC # BLD AUTO: 3.8 X10*3/UL (ref 4.4–11.3)

## 2024-09-18 PROCEDURE — 85025 COMPLETE CBC W/AUTO DIFF WBC: CPT

## 2024-09-18 PROCEDURE — 96375 TX/PRO/DX INJ NEW DRUG ADDON: CPT | Mod: INF

## 2024-09-18 PROCEDURE — 1125F AMNT PAIN NOTED PAIN PRSNT: CPT | Performed by: INTERNAL MEDICINE

## 2024-09-18 PROCEDURE — 99214 OFFICE O/P EST MOD 30 MIN: CPT | Mod: 25 | Performed by: INTERNAL MEDICINE

## 2024-09-18 PROCEDURE — 3078F DIAST BP <80 MM HG: CPT | Performed by: INTERNAL MEDICINE

## 2024-09-18 PROCEDURE — 96409 CHEMO IV PUSH SNGL DRUG: CPT

## 2024-09-18 PROCEDURE — 99214 OFFICE O/P EST MOD 30 MIN: CPT | Performed by: INTERNAL MEDICINE

## 2024-09-18 PROCEDURE — 84075 ASSAY ALKALINE PHOSPHATASE: CPT

## 2024-09-18 PROCEDURE — 2500000004 HC RX 250 GENERAL PHARMACY W/ HCPCS (ALT 636 FOR OP/ED): Mod: JZ,TB | Performed by: INTERNAL MEDICINE

## 2024-09-18 PROCEDURE — 3008F BODY MASS INDEX DOCD: CPT | Performed by: INTERNAL MEDICINE

## 2024-09-18 PROCEDURE — 1159F MED LIST DOCD IN RCRD: CPT | Performed by: INTERNAL MEDICINE

## 2024-09-18 PROCEDURE — 77386 HC INTENSITY-MODULATED RADIATION THERAPY (IMRT), COMPLEX: CPT | Performed by: STUDENT IN AN ORGANIZED HEALTH CARE EDUCATION/TRAINING PROGRAM

## 2024-09-18 PROCEDURE — 3074F SYST BP LT 130 MM HG: CPT | Performed by: INTERNAL MEDICINE

## 2024-09-18 PROCEDURE — 2500000004 HC RX 250 GENERAL PHARMACY W/ HCPCS (ALT 636 FOR OP/ED): Performed by: INTERNAL MEDICINE

## 2024-09-18 RX ORDER — MITOMYCIN 20 MG/40ML
10 INJECTION, POWDER, LYOPHILIZED, FOR SOLUTION INTRAVENOUS ONCE
Status: COMPLETED | OUTPATIENT
Start: 2024-09-18 | End: 2024-09-18

## 2024-09-18 RX ORDER — ONDANSETRON HYDROCHLORIDE 2 MG/ML
8 INJECTION, SOLUTION INTRAVENOUS ONCE
Status: COMPLETED | OUTPATIENT
Start: 2024-09-18 | End: 2024-09-18

## 2024-09-18 RX ORDER — HYDROCODONE BITARTRATE AND ACETAMINOPHEN 7.5; 325 MG/1; MG/1
1 TABLET ORAL EVERY 6 HOURS PRN
Qty: 60 TABLET | Refills: 0 | Status: SHIPPED | OUTPATIENT
Start: 2024-09-18 | End: 2024-10-08

## 2024-09-18 ASSESSMENT — ENCOUNTER SYMPTOMS
RECTAL PAIN: 1
CONSTITUTIONAL NEGATIVE: 1
ABDOMINAL DISTENTION: 0
ABDOMINAL PAIN: 0
MUSCULOSKELETAL NEGATIVE: 1
DIARRHEA: 1
CARDIOVASCULAR NEGATIVE: 1
BLOOD IN STOOL: 0

## 2024-09-18 ASSESSMENT — PATIENT HEALTH QUESTIONNAIRE - PHQ9
2. FEELING DOWN, DEPRESSED OR HOPELESS: NOT AT ALL
1. LITTLE INTEREST OR PLEASURE IN DOING THINGS: NOT AT ALL
SUM OF ALL RESPONSES TO PHQ9 QUESTIONS 1 & 2: 0

## 2024-09-18 ASSESSMENT — PAIN SCALES - GENERAL
PAINLEVEL: 8
PAINLEVEL: 7

## 2024-09-18 NOTE — SIGNIFICANT EVENT

## 2024-09-18 NOTE — PROGRESS NOTES
September 18, 2024 at 9:40 AM    Animal dander, House dust mite, Sulfa (sulfonamide antibiotics), Cat dander, and Codeine    Mary Palacios is a 69 y.o. female   There were no vitals taken for this visit.  There is no height or weight on file to calculate BSA.    Past Medical History:   Diagnosis Date    GERD (gastroesophageal reflux disease) 5/15/2015    Hypertension        Encounter Diagnosis   Name Primary?    Squamous cell cancer, anus (Multi)        Has the entire regimen been authorized by financial clearance (pre-certification)?  Yes     Prior to Admission medications    Medication Sig Start Date End Date Taking? Authorizing Provider   acetaminophen (Tylenol) 325 mg capsule Take by mouth.    Historical Provider, MD   amLODIPine (Norvasc) 10 mg tablet Take 1 tablet (10 mg) by mouth once daily. 5/21/24 9/18/24  Historical Provider, MD   atorvastatin (Lipitor) 20 mg tablet Take 1 tablet (20 mg) by mouth once daily. 5/21/24   Historical Provider, MD   azelastine (Optivar) 0.05 % ophthalmic solution INSTILL 1 DROP INTO AFFECTED EYE(S) TWICE A DAY AS NEEDED 9/2/23   Historical Provider, MD   busPIRone (Buspar) 10 mg tablet Take 1 tablet (10 mg) by mouth once daily. 7/8/24   Historical Provider, MD   capecitabine (Xeloda) 150 mg tablet Take 8 tablets (1,200 mg total) by mouth 2 times a day.  Take on days of radiation only. Take with food. Swallow whole with water. Do not crush or cut. Take with 500mg tablets for total dose of 1700mg. 9/6/24 10/22/24  Cristian Álvarez MD   capecitabine (Xeloda) 500 mg tablet Take 1 tablet (500 mg total) by mouth 2 times a day.  Take on days of radiation only. Take with food. Swallow whole with water. Do not crush or cut. Take with 150mg tablets for total dose of 1700mg. 9/6/24 10/22/24  Cristian Álvarez MD   ergocalciferol (Vitamin D-2) 1.25 MG (60982 UT) capsule Take 1 capsule (50,000 Units) by mouth once a week. 6/6/24   Historical Provider, MD   escitalopram (Lexapro) 20 mg  tablet Take 1 tablet (20 mg) by mouth once daily.    Historical Provider, MD   fexofenadine (Allegra) 180 mg tablet Take 1 tablet (180 mg) by mouth once daily.    Historical Provider, MD   HYDROcodone-acetaminophen (Norco) 7.5-325 mg tablet Take 1 tablet by mouth every 6 hours if needed for severe pain (7 - 10) or moderate pain (4 - 6) for up to 20 days. 9/18/24 10/8/24  Cristian Álvarez MD   hydrocortisone (Anusol-HC) 2.5 % rectal cream Insert into the rectum twice a day. 7/9/24 10/7/24  Historical Provider, MD   loperamide (Imodium) 2 mg capsule Take 2 capsules (4 mg) by mouth with the first episode of diarrhea and 1 capsule (2 mg) by mouth with any additional episodes. Maximum 8 capsules (16 mg) per day. 7/24/24   Cristian Álvarez MD   losartan (Cozaar) 100 mg tablet Take 1 tablet (100 mg) by mouth once daily. 6/18/24   Historical Provider, MD   magnesium oxide (Mag-Ox) 400 mg (241.3 mg magnesium) tablet Take 1 tablet (400 mg) by mouth early in the morning.. 6/11/24   Historical Provider, MD   meclizine (Antivert) 12.5 mg tablet Take 1 tablet (12.5 mg) by mouth every 6 hours if needed. 9/18/23   Historical Provider, MD   omeprazole (PriLOSEC) 40 mg DR capsule Take 1 capsule (40 mg) by mouth once daily. 5/10/24   Historical Provider, MD   ondansetron (Zofran) 8 mg tablet Take 1 tablet (8 mg) by mouth every 8 hours if needed for nausea or vomiting. 7/24/24   Cristian Álvarez MD   prochlorperazine (Compazine) 10 mg tablet Take 1 tablet (10 mg) by mouth every 6 hours if needed for nausea or vomiting. 7/24/24   Cristian Álvarez MD   LORazepam (Ativan) 2 mg tablet Take 1 tablet (2 mg) by mouth every 6 hours if needed for anxiety for up to 1 day. Take one table 2 hours prior to your MRI 8/9/24 9/18/24  Jason Bell MD       Reviewed documented list of home medications.  No significant drug interactions identified between home medications and chemotherapy regimen.    Yes    WBC   Date Value Ref Range Status    09/18/2024 3.8 (L) 4.4 - 11.3 x10*3/uL Final   09/05/2024 3.2 (L) 4.4 - 11.3 x10*3/uL Final   08/19/2024 8.4 4.4 - 11.3 x10*3/uL Final     Hemoglobin   Date Value Ref Range Status   09/18/2024 11.4 (L) 12.0 - 16.0 g/dL Final   09/05/2024 11.8 (L) 12.0 - 16.0 g/dL Final   08/19/2024 13.3 12.0 - 16.0 g/dL Final     Hematocrit   Date Value Ref Range Status   09/18/2024 34.8 (L) 36.0 - 46.0 % Final   09/05/2024 35.8 (L) 36.0 - 46.0 % Final   08/19/2024 41.6 36.0 - 46.0 % Final     Neutrophils Absolute   Date Value Ref Range Status   09/18/2024 2.97 1.20 - 7.70 x10*3/uL Final     Comment:     Percent differential counts (%) should be interpreted in the context of the absolute cell counts (cells/uL).   09/05/2024 2.12 1.20 - 7.70 x10*3/uL Final     Comment:     Percent differential counts (%) should be interpreted in the context of the absolute cell counts (cells/uL).   08/19/2024 5.84 1.20 - 7.70 x10*3/uL Final     Comment:     Percent differential counts (%) should be interpreted in the context of the absolute cell counts (cells/uL).     Platelets   Date Value Ref Range Status   09/18/2024 170 150 - 450 x10*3/uL Final   09/05/2024 110 (L) 150 - 450 x10*3/uL Final     Comment:     Platelet count verified by smear review.   08/19/2024 370 150 - 450 x10*3/uL Final     Creatinine   Date Value Ref Range Status   09/18/2024 0.97 0.50 - 1.05 mg/dL Final   09/05/2024 0.81 0.50 - 1.05 mg/dL Final   08/19/2024 0.90 0.50 - 1.05 mg/dL Final     Alkaline Phosphatase   Date Value Ref Range Status   09/18/2024 94 33 - 136 U/L Final   09/05/2024 78 33 - 136 U/L Final   08/19/2024 109 33 - 136 U/L Final     AST   Date Value Ref Range Status   09/18/2024 21 9 - 39 U/L Final   09/05/2024 17 9 - 39 U/L Final   08/19/2024 17 9 - 39 U/L Final     ALT   Date Value Ref Range Status   09/18/2024 15 7 - 45 U/L Final     Comment:     Patients treated with Sulfasalazine may generate falsely decreased results for ALT.   09/05/2024 14 7 - 45 U/L  Final     Comment:     Patients treated with Sulfasalazine may generate falsely decreased results for ALT.   08/19/2024 22 7 - 45 U/L Final     Comment:     Patients treated with Sulfasalazine may generate falsely decreased results for ALT.     Bilirubin, Total   Date Value Ref Range Status   09/18/2024 0.7 0.0 - 1.2 mg/dL Final   09/05/2024 0.6 0.0 - 1.2 mg/dL Final   08/19/2024 0.3 0.0 - 1.2 mg/dL Final         Does the patient meet the treatment conditions?  Yes    ANC >= 1.5  Plt >= 100  Bili <= 1.5 x ULN  Crcl >= 50    Are dosage calculations correct?  Yes    Are doses the same as previous cycle?   Yes    Were any doses modified?  No    If appropriate, was the Creatinine Clearance independently calculated based on [facility] standards?   Yes      Comments:      I Robert Cary, PharmD hereby acknowledge that the treatment plan indicated above has been verified for correctness to the best of my ability on 9/18/2024 at 9:40 AM.

## 2024-09-18 NOTE — PROGRESS NOTES
Radiation Oncology On Treatment Visit    Patient Name:  Raquel Palacios  MRN:  [id number]  :  1955    Referring Provider: No ref. provider found  Primary Care Provider: Jeremias Rubin DO  Care Team: Patient Care Team:  Jeremias Rubin DO as PCP - General (Family Medicine)  Cristian Álvarez MD as Medical Oncologist (Hematology and Oncology)    Date of Service: 2024     Diagnosis:   Specialty Problems          Radiation Oncology Problems    Squamous cell cancer, anus (Multi)         Treatment Summary:  IMRT: Not Applicable Anal canal, Bilateral Inguinal lymph node, Bilateral Pelvis    Treatment Period Technique Fraction Dose Fractions Total Dose   Course 1 2024-2024  (days elapsed: 28)         Anus_Pel_Ing 2024-2024 VMAT 180 / 180 cGy  3600 / 5,040 cGy     SUBJECTIVE: Significant moderate/severe burning with bowel movements related to diarrhea and dermatitis of the anal canal and perianal skin, prescribed hydrocodone/acetaminophen by medical oncology.  Using Imodium and low fiber diet for grade 2 diarrhea.  Received second infusion of mitomycin C.     OBJECTIVE:   Vital Signs:  /83 (Patient Position: Sitting)   Pulse 97   Temp 36.2 °C (97.2 °F)   Resp 18   Wt 93.1 kg (205 lb 4 oz)   SpO2 97%   BMI 33.13 kg/m²     Other Pertinent Findings:     Toxicity Assessment          2024    14:08 2024    11:13 2024    11:16 2024    11:07 2024    12:05   Toxicity Assessment   Treatment Site Pelvis - female Pelvis - female Pelvis - female Pelvis - female Pelvis - female   Anorexia Grade 0 Grade 1 Grade 1 Grade 1 Grade 0   Anxiety Grade 0 Grade 0 Grade 0 Grade 0 Grade 0   Dehydration Grade 0 Grade 0 Grade 0 Grade 0 Grade 0   Depression Grade 0 Grade 0 Grade 0 Grade 0 Grade 0   Dermatitis Radiation Grade 0 Grade 0 Grade 0  Grade 2   Diarrhea Grade 0 Grade 1       liquid stool this morning 4 times then took immodium' Grade 1       off and on. 1 immodium takes  care of it Grade 1       1 episode of diarrhea this morning Grade 2   Fatigue Grade 0 Grade 1 Grade 1 Grade 1 Grade 1   Fibrosis Deep Connective Tissue Grade 0 Grade 0 Grade 0 Grade 0 Grade 0   Fracture Grade 0 Grade 0 Grade 0 Grade 0 Grade 0   Nausea Grade 0 Grade 1 Grade 1 Grade 0 Grade 1   Pain Grade 0 Grade 0 Grade 0 Grade 1       discomfort in lower abdomen Grade 2       7/10 buttocks   Treatment Related Secondary Malignancy Grade 0 Grade 0 Grade 0 Grade 0 Grade 0   Tumor Pain Grade 0 Grade 0 Grade 0 Grade 0 Grade 0   Vomiting Grade 0 Grade 0 Grade 0 Grade 0 Grade 0   Lower Gastrointestinal Hemorrhage Grade 0 Grade 0 Grade 1       small amount this morning when wiping Grade 0       scant amount when wiping Grade 0   Proctitis Grade 0 Grade 0 Grade 0 Grade 0 Grade 0   Cystitis Noninfective Grade 0 Grade 0 Grade 0 Grade 0 Grade 0   Urinary Incontinence Grade 0 Grade 0 Grade 0 Grade 0 Grade 0   Pelvic Pain Grade 0 Grade 0 Grade 0 Grade 0    Pelvic Soft Tissue Necrosis Grade 0 Grade 0 Grade 0 Grade 0 Grade 0   Neuralgia Grade 0 Grade 0 Grade 0 Grade 0 Grade 0   Chronic Kidney Disease Grade 0 Grade 0 Grade 0 Grade 0 Grade 0   Vaginal Dryness Grade 0 Grade 0 Grade 0 Grade 0 Grade 0   Vaginal Fistula Grade 0 Grade 0 Grade 0 Grade 0 Grade 0   Vaginal Hemorrhage Grade 0 Grade 0 Grade 0 Grade 0 Grade 0   Vaginal Stricture Grade 0 Grade 0 Grade 0 Grade 0 Grade 0   Lymphedema Grade 0 Grade 0 Grade 0 Grade 0 Grade 0   Thromboembolic Event Grade 0 Grade 0 Grade 0 Grade 0 Grade 0   Hot Flashes Grade 0 Grade 0 Grade 0 Grade 0 Grade 0        Concurrent systemic therapy: methylPREDNISolone sod succinate (SOLU-Medrol) 40 mg/mL injection 40 mg, 40 mg, intravenous, As needed, 1 of 1 cycle        mitoMYcin (Mutamycin) 20 mg injection in 40 mL, 10 mg/m2 = 20 mg, intravenous, Once, 1 of 1 cycle    Administration: 20 mg (8/21/2024), 20 mg (9/18/2024)      Labs:   WBC   Date Value Ref Range Status   09/18/2024 3.8 (L) 4.4 - 11.3  x10*3/uL Final   09/05/2024 3.2 (L) 4.4 - 11.3 x10*3/uL Final     Hemoglobin   Date Value Ref Range Status   09/18/2024 11.4 (L) 12.0 - 16.0 g/dL Final   09/05/2024 11.8 (L) 12.0 - 16.0 g/dL Final     Hematocrit   Date Value Ref Range Status   09/18/2024 34.8 (L) 36.0 - 46.0 % Final   09/05/2024 35.8 (L) 36.0 - 46.0 % Final     Neutrophils Absolute   Date Value Ref Range Status   09/18/2024 2.97 1.20 - 7.70 x10*3/uL Final     Comment:     Percent differential counts (%) should be interpreted in the context of the absolute cell counts (cells/uL).   09/05/2024 2.12 1.20 - 7.70 x10*3/uL Final     Comment:     Percent differential counts (%) should be interpreted in the context of the absolute cell counts (cells/uL).     Platelets   Date Value Ref Range Status   09/18/2024 170 150 - 450 x10*3/uL Final   09/05/2024 110 (L) 150 - 450 x10*3/uL Final     Comment:     Platelet count verified by smear review.     Alkaline Phosphatase   Date Value Ref Range Status   09/18/2024 94 33 - 136 U/L Final   09/05/2024 78 33 - 136 U/L Final     AST   Date Value Ref Range Status   09/18/2024 21 9 - 39 U/L Final   09/05/2024 17 9 - 39 U/L Final     ALT   Date Value Ref Range Status   09/18/2024 15 7 - 45 U/L Final     Comment:     Patients treated with Sulfasalazine may generate falsely decreased results for ALT.   09/05/2024 14 7 - 45 U/L Final     Comment:     Patients treated with Sulfasalazine may generate falsely decreased results for ALT.     Bilirubin, Total   Date Value Ref Range Status   09/18/2024 0.7 0.0 - 1.2 mg/dL Final   09/05/2024 0.6 0.0 - 1.2 mg/dL Final     Glucose   Date Value Ref Range Status   09/18/2024 101 (H) 74 - 99 mg/dL Final   09/05/2024 89 74 - 99 mg/dL Final     Calcium   Date Value Ref Range Status   09/18/2024 9.0 8.6 - 10.3 mg/dL Final   09/05/2024 8.7 8.6 - 10.3 mg/dL Final     Sodium   Date Value Ref Range Status   09/18/2024 138 136 - 145 mmol/L Final   09/05/2024 141 136 - 145 mmol/L Final      Potassium   Date Value Ref Range Status   09/18/2024 3.7 3.5 - 5.3 mmol/L Final   09/05/2024 3.7 3.5 - 5.3 mmol/L Final     Bicarbonate   Date Value Ref Range Status   09/18/2024 28 21 - 32 mmol/L Final   09/05/2024 26 21 - 32 mmol/L Final     Chloride   Date Value Ref Range Status   09/18/2024 103 98 - 107 mmol/L Final   09/05/2024 105 98 - 107 mmol/L Final     Urea Nitrogen   Date Value Ref Range Status   09/18/2024 13 6 - 23 mg/dL Final   09/05/2024 9 6 - 23 mg/dL Final     Creatinine   Date Value Ref Range Status   09/18/2024 0.97 0.50 - 1.05 mg/dL Final   09/05/2024 0.81 0.50 - 1.05 mg/dL Final         Exam: Grade 2 radiation dermatitis of the perianal skin.     Assessment / Plan:  The patient is tolerating radiation therapy as anticipated.  Continue per current treatment plan.         Therapies: Continue essentials ointment and Aquaphor for skin care.  Hydrocodone/acetaminophen as needed for pain.  Imodium and low fiber diet for diarrhea.  Will monitor leukopenia without neutropenia.    Side effects reviewed with patient. Images, chart and labs reviewed.    Jorge Padilla MD

## 2024-09-18 NOTE — PROGRESS NOTES
"Patient ID: Mary Palacios is a 69 y.o. female.    Subjective:  Returns for follow up for anal cancer. Has 9 days left to complete chemoRT. Takes capecitabine as directed M-F. Denies having fever. Rectal sarea started burning a few days ago. Moves bowel 3-4 times daily. Loose stools.     Heme/Onc History:  Stage/Status:  - p/w anal mass without pain in June 2024. Colonoscopy in Jul 2024: Neg except perianal mass. Bx showed p16 and p40 positive SCC  - PET/CT (Aug 2024): Uptake in anus. No LAPs. MR rectum (Aug 2024): 2.6 cm restricting lesion in inferior anal canal without LAPs  - Deemed not to be amenable to complete resection => Started Nigor protocol (chemoRT with capecitabine/mitomycin) on 8/21/24    Assessment/Plan:  ? Anal cancer: T2N0M0. Not resectable. Tolerating Irina protocol with capecitabine well so far. 9 days of radiation left. Will give day 28 mitomycin today if labs are OK.   I asked her to take imodium for diarrhea. Haley tart hydrocodone for her pain.   Will get a CT c/a/p in 3 months for eval.     Review Of Systems:  Review of Systems   Constitutional: Negative.    HENT:  Negative.     Cardiovascular: Negative.    Gastrointestinal:  Positive for diarrhea and rectal pain. Negative for abdominal distention, abdominal pain and blood in stool.   Genitourinary: Negative.     Musculoskeletal: Negative.        Physical Exam:  /77 (BP Location: Left arm, Patient Position: Sitting, BP Cuff Size: Adult)   Pulse 88   Temp 36.1 °C (97 °F) (Temporal)   Resp 16   Ht 1.676 m (5' 6\")   Wt 92.7 kg (204 lb 4.1 oz)   SpO2 97%   BMI 32.97 kg/m²   BSA: 2.08 meters squared  Performance Status: Asymptomatic  Physical Exam  Constitutional:       Appearance: Normal appearance.   HENT:      Head: Normocephalic and atraumatic.   Eyes:      Pupils: Pupils are equal, round, and reactive to light.   Cardiovascular:      Rate and Rhythm: Normal rate and regular rhythm.   Pulmonary:      Effort: Pulmonary effort is " normal.   Abdominal:      General: Abdomen is flat.      Palpations: Abdomen is soft. There is no mass.   Musculoskeletal:      Right lower leg: No edema.      Left lower leg: No edema.   Lymphadenopathy:      Cervical: No cervical adenopathy.   Skin:     Coloration: Skin is not jaundiced.   Neurological:      General: No focal deficit present.      Mental Status: She is alert and oriented to person, place, and time.         Results:  Diagnostic Results   Lab Results   Component Value Date    WBC 3.2 (L) 09/05/2024    HGB 11.8 (L) 09/05/2024    HCT 35.8 (L) 09/05/2024    MCV 89 09/05/2024     (L) 09/05/2024     Lab Results   Component Value Date    CALCIUM 8.7 09/05/2024     09/05/2024    K 3.7 09/05/2024    CO2 26 09/05/2024     09/05/2024    BUN 9 09/05/2024    CREATININE 0.81 09/05/2024    ALT 14 09/05/2024    AST 17 09/05/2024       Current Outpatient Medications:     acetaminophen (Tylenol) 325 mg capsule, Take by mouth., Disp: , Rfl:     amLODIPine (Norvasc) 10 mg tablet, Take 1 tablet (10 mg) by mouth once daily., Disp: , Rfl:     atorvastatin (Lipitor) 20 mg tablet, Take 1 tablet (20 mg) by mouth once daily., Disp: , Rfl:     azelastine (Optivar) 0.05 % ophthalmic solution, INSTILL 1 DROP INTO AFFECTED EYE(S) TWICE A DAY AS NEEDED, Disp: , Rfl:     busPIRone (Buspar) 10 mg tablet, Take 1 tablet (10 mg) by mouth once daily., Disp: , Rfl:     capecitabine (Xeloda) 150 mg tablet, Take 8 tablets (1,200 mg total) by mouth 2 times a day.  Take on days of radiation only. Take with food. Swallow whole with water. Do not crush or cut. Take with 500mg tablets for total dose of 1700mg., Disp: 480 tablet, Rfl: 0    capecitabine (Xeloda) 500 mg tablet, Take 1 tablet (500 mg total) by mouth 2 times a day.  Take on days of radiation only. Take with food. Swallow whole with water. Do not crush or cut. Take with 150mg tablets for total dose of 1700mg., Disp: 60 tablet, Rfl: 0    ergocalciferol (Vitamin  D-2) 1.25 MG (85271 UT) capsule, Take 1 capsule (50,000 Units) by mouth once a week., Disp: , Rfl:     escitalopram (Lexapro) 20 mg tablet, Take 1 tablet (20 mg) by mouth once daily., Disp: , Rfl:     fexofenadine (Allegra) 180 mg tablet, Take 1 tablet (180 mg) by mouth once daily., Disp: , Rfl:     hydrocortisone (Anusol-HC) 2.5 % rectal cream, Insert into the rectum twice a day., Disp: , Rfl:     loperamide (Imodium) 2 mg capsule, Take 2 capsules (4 mg) by mouth with the first episode of diarrhea and 1 capsule (2 mg) by mouth with any additional episodes. Maximum 8 capsules (16 mg) per day., Disp: 100 capsule, Rfl: 2    losartan (Cozaar) 100 mg tablet, Take 1 tablet (100 mg) by mouth once daily., Disp: , Rfl:     magnesium oxide (Mag-Ox) 400 mg (241.3 mg magnesium) tablet, Take 1 tablet (400 mg) by mouth early in the morning.., Disp: , Rfl:     meclizine (Antivert) 12.5 mg tablet, Take 1 tablet (12.5 mg) by mouth every 6 hours if needed., Disp: , Rfl:     omeprazole (PriLOSEC) 40 mg DR capsule, Take 1 capsule (40 mg) by mouth once daily., Disp: , Rfl:     ondansetron (Zofran) 8 mg tablet, Take 1 tablet (8 mg) by mouth every 8 hours if needed for nausea or vomiting., Disp: 30 tablet, Rfl: 5    prochlorperazine (Compazine) 10 mg tablet, Take 1 tablet (10 mg) by mouth every 6 hours if needed for nausea or vomiting., Disp: 30 tablet, Rfl: 5    HYDROcodone-acetaminophen (Norco) 7.5-325 mg tablet, Take 1 tablet by mouth every 6 hours if needed for severe pain (7 - 10) or moderate pain (4 - 6) for up to 20 days., Disp: 60 tablet, Rfl: 0    LORazepam (Ativan) 2 mg tablet, Take 1 tablet (2 mg) by mouth every 6 hours if needed for anxiety for up to 1 day. Take one table 2 hours prior to your MRI, Disp: 1 tablet, Rfl: 0     Past Surgical History:   Procedure Laterality Date    HERNIA REPAIR       Family History   Problem Relation Name Age of Onset    Colon cancer Mother Regla Kojames     Melanoma Mother Regla Florentinparesh      Cancer Mother Regla Fernández     Prostate cancer Father Lester Fernández     Cancer Father Lester Fernández     Breast cancer Mother's Sister      Lung cancer Mother's Sister      Lung cancer Mother's Brother      Cancer Mother's Sister Sylvie Daniel     Cancer Mother's Sister Piper Louie     Cancer Mother's Brother Anatoly Cool       reports that she has been smoking cigarettes. She has a 7.5 pack-year smoking history. She has been exposed to tobacco smoke. She has never used smokeless tobacco.  Social History     Socioeconomic History    Marital status:      Spouse name: Not on file    Number of children: Not on file    Years of education: Not on file    Highest education level: Not on file   Occupational History    Not on file   Tobacco Use    Smoking status: Every Day     Current packs/day: 0.50     Average packs/day: 0.5 packs/day for 15.0 years (7.5 ttl pk-yrs)     Types: Cigarettes     Passive exposure: Past    Smokeless tobacco: Never   Vaping Use    Vaping status: Never Used   Substance and Sexual Activity    Alcohol use: Never    Drug use: Yes     Types: Marijuana    Sexual activity: Not Currently   Other Topics Concern    Not on file   Social History Narrative    Not on file     Social Determinants of Health     Financial Resource Strain: Low Risk  (7/24/2024)    Overall Financial Resource Strain (CARDIA)     Difficulty of Paying Living Expenses: Not hard at all   Food Insecurity: No Food Insecurity (7/24/2024)    Hunger Vital Sign     Worried About Running Out of Food in the Last Year: Never true     Ran Out of Food in the Last Year: Never true   Transportation Needs: No Transportation Needs (7/24/2024)    PRAPARE - Transportation     Lack of Transportation (Medical): No     Lack of Transportation (Non-Medical): No   Physical Activity: Inactive (7/24/2024)    Exercise Vital Sign     Days of Exercise per Week: 0 days     Minutes of Exercise per Session: 0 min   Stress: No Stress Concern Present  (8/21/2024)    [place] [employer] - Occupational Stress Questionnaire     Feeling of Stress : Not at all   Social Connections: Moderately Integrated (7/24/2024)    Social Connection and Isolation Panel [NHANES]     Frequency of Communication with Friends and Family: More than three times a week     Frequency of Social Gatherings with Friends and Family: Three times a week     Attends Restoration Services: More than 4 times per year     Active Member of Clubs or Organizations: Yes     Attends Club or Organization Meetings: 1 to 4 times per year     Marital Status:    Intimate Partner Violence: Not At Risk (7/24/2024)    Humiliation, Afraid, Rape, and Kick questionnaire     Fear of Current or Ex-Partner: No     Emotionally Abused: No     Physically Abused: No     Sexually Abused: No   Housing Stability: Low Risk  (7/24/2024)    Housing Stability Vital Sign     Unable to Pay for Housing in the Last Year: No     Number of Times Moved in the Last Year: 1     Homeless in the Last Year: No       Diagnoses and all orders for this visit:  Squamous cell cancer, anus (Multi)  -     Clinic Appointment Request  -     CBC and Auto Differential; Future  -     Comprehensive Metabolic Panel; Future  -     Clinic Appointment Request; Future  -     CBC and Auto Differential; Future  -     Comprehensive Metabolic Panel; Future  -     CT chest abdomen pelvis w IV contrast; Future  -     HYDROcodone-acetaminophen (Norco) 7.5-325 mg tablet; Take 1 tablet by mouth every 6 hours if needed for severe pain (7 - 10) or moderate pain (4 - 6) for up to 20 days.       I spent more than 30 minutes for the patient today, including face-to-face conversation, pre-visit preparation, post-visit orders, and others.   Cristian Álvarez MD

## 2024-09-19 ENCOUNTER — HOSPITAL ENCOUNTER (OUTPATIENT)
Dept: RADIATION ONCOLOGY | Facility: CLINIC | Age: 69
Setting detail: RADIATION/ONCOLOGY SERIES
Discharge: HOME | End: 2024-09-19
Payer: MEDICARE

## 2024-09-19 DIAGNOSIS — Z51.0 ENCOUNTER FOR ANTINEOPLASTIC RADIATION THERAPY: ICD-10-CM

## 2024-09-19 DIAGNOSIS — C21.0 MALIGNANT NEOPLASM OF ANUS, UNSPECIFIED: ICD-10-CM

## 2024-09-19 LAB
RAD ONC MSQ ACTUAL FRACTIONS DELIVERED: 21
RAD ONC MSQ ACTUAL SESSION DELIVERED DOSE: 180 CGRAY
RAD ONC MSQ ACTUAL TOTAL DOSE: 3780 CGRAY
RAD ONC MSQ ELAPSED DAYS: 29
RAD ONC MSQ LAST DATE: NORMAL
RAD ONC MSQ PRESCRIBED FRACTIONAL DOSE: 180 CGRAY
RAD ONC MSQ PRESCRIBED NUMBER OF FRACTIONS: 28
RAD ONC MSQ PRESCRIBED TECHNIQUE: NORMAL
RAD ONC MSQ PRESCRIBED TOTAL DOSE: 5040 CGRAY
RAD ONC MSQ PRESCRIPTION PATTERN COMMENT: NORMAL
RAD ONC MSQ START DATE: NORMAL
RAD ONC MSQ TREATMENT COURSE NUMBER: 1
RAD ONC MSQ TREATMENT SITE: NORMAL

## 2024-09-19 PROCEDURE — 77386 HC INTENSITY-MODULATED RADIATION THERAPY (IMRT), COMPLEX: CPT | Performed by: STUDENT IN AN ORGANIZED HEALTH CARE EDUCATION/TRAINING PROGRAM

## 2024-09-20 ENCOUNTER — HOSPITAL ENCOUNTER (OUTPATIENT)
Dept: RADIATION ONCOLOGY | Facility: CLINIC | Age: 69
Setting detail: RADIATION/ONCOLOGY SERIES
Discharge: HOME | End: 2024-09-20
Payer: MEDICARE

## 2024-09-20 DIAGNOSIS — C21.0 MALIGNANT NEOPLASM OF ANUS, UNSPECIFIED: ICD-10-CM

## 2024-09-20 DIAGNOSIS — Z51.0 ENCOUNTER FOR ANTINEOPLASTIC RADIATION THERAPY: ICD-10-CM

## 2024-09-20 LAB
RAD ONC MSQ ACTUAL FRACTIONS DELIVERED: 22
RAD ONC MSQ ACTUAL SESSION DELIVERED DOSE: 180 CGRAY
RAD ONC MSQ ACTUAL TOTAL DOSE: 3960 CGRAY
RAD ONC MSQ ELAPSED DAYS: 30
RAD ONC MSQ LAST DATE: NORMAL
RAD ONC MSQ PRESCRIBED FRACTIONAL DOSE: 180 CGRAY
RAD ONC MSQ PRESCRIBED NUMBER OF FRACTIONS: 28
RAD ONC MSQ PRESCRIBED TECHNIQUE: NORMAL
RAD ONC MSQ PRESCRIBED TOTAL DOSE: 5040 CGRAY
RAD ONC MSQ PRESCRIPTION PATTERN COMMENT: NORMAL
RAD ONC MSQ START DATE: NORMAL
RAD ONC MSQ TREATMENT COURSE NUMBER: 1
RAD ONC MSQ TREATMENT SITE: NORMAL

## 2024-09-20 PROCEDURE — 77386 HC INTENSITY-MODULATED RADIATION THERAPY (IMRT), COMPLEX: CPT | Performed by: STUDENT IN AN ORGANIZED HEALTH CARE EDUCATION/TRAINING PROGRAM

## 2024-09-23 ENCOUNTER — TELEPHONE (OUTPATIENT)
Dept: RADIATION ONCOLOGY | Facility: CLINIC | Age: 69
End: 2024-09-23

## 2024-09-23 ENCOUNTER — HOSPITAL ENCOUNTER (OUTPATIENT)
Dept: RADIATION ONCOLOGY | Facility: CLINIC | Age: 69
Setting detail: RADIATION/ONCOLOGY SERIES
Discharge: HOME | End: 2024-09-23
Payer: MEDICARE

## 2024-09-23 DIAGNOSIS — Z51.0 ENCOUNTER FOR ANTINEOPLASTIC RADIATION THERAPY: ICD-10-CM

## 2024-09-23 DIAGNOSIS — C21.0 ANAL CANCER (MULTI): Primary | ICD-10-CM

## 2024-09-23 DIAGNOSIS — C21.0 ANAL CANCER (MULTI): ICD-10-CM

## 2024-09-23 LAB
FLUAV RNA RESP QL NAA+PROBE: NOT DETECTED
FLUBV RNA RESP QL NAA+PROBE: NOT DETECTED
RAD ONC MSQ ACTUAL FRACTIONS DELIVERED: 23
RAD ONC MSQ ACTUAL SESSION DELIVERED DOSE: 180 CGRAY
RAD ONC MSQ ACTUAL TOTAL DOSE: 4140 CGRAY
RAD ONC MSQ ELAPSED DAYS: 33
RAD ONC MSQ LAST DATE: NORMAL
RAD ONC MSQ PRESCRIBED FRACTIONAL DOSE: 180 CGRAY
RAD ONC MSQ PRESCRIBED NUMBER OF FRACTIONS: 28
RAD ONC MSQ PRESCRIBED TECHNIQUE: NORMAL
RAD ONC MSQ PRESCRIBED TOTAL DOSE: 5040 CGRAY
RAD ONC MSQ PRESCRIPTION PATTERN COMMENT: NORMAL
RAD ONC MSQ START DATE: NORMAL
RAD ONC MSQ TREATMENT COURSE NUMBER: 1
RAD ONC MSQ TREATMENT SITE: NORMAL
SARS-COV-2 RNA RESP QL NAA+PROBE: NOT DETECTED

## 2024-09-23 PROCEDURE — 87636 SARSCOV2 & INF A&B AMP PRB: CPT | Mod: WESLAB | Performed by: STUDENT IN AN ORGANIZED HEALTH CARE EDUCATION/TRAINING PROGRAM

## 2024-09-23 PROCEDURE — 77386 HC INTENSITY-MODULATED RADIATION THERAPY (IMRT), COMPLEX: CPT | Performed by: STUDENT IN AN ORGANIZED HEALTH CARE EDUCATION/TRAINING PROGRAM

## 2024-09-24 ENCOUNTER — HOSPITAL ENCOUNTER (OUTPATIENT)
Dept: RADIATION ONCOLOGY | Facility: CLINIC | Age: 69
Setting detail: RADIATION/ONCOLOGY SERIES
Discharge: HOME | End: 2024-09-24
Payer: MEDICARE

## 2024-09-24 DIAGNOSIS — C21.0 MALIGNANT NEOPLASM OF ANUS, UNSPECIFIED: ICD-10-CM

## 2024-09-24 DIAGNOSIS — Z51.0 ENCOUNTER FOR ANTINEOPLASTIC RADIATION THERAPY: ICD-10-CM

## 2024-09-24 LAB
RAD ONC MSQ ACTUAL FRACTIONS DELIVERED: 24
RAD ONC MSQ ACTUAL SESSION DELIVERED DOSE: 180 CGRAY
RAD ONC MSQ ACTUAL TOTAL DOSE: 4320 CGRAY
RAD ONC MSQ ELAPSED DAYS: 34
RAD ONC MSQ LAST DATE: NORMAL
RAD ONC MSQ PRESCRIBED FRACTIONAL DOSE: 180 CGRAY
RAD ONC MSQ PRESCRIBED NUMBER OF FRACTIONS: 28
RAD ONC MSQ PRESCRIBED TECHNIQUE: NORMAL
RAD ONC MSQ PRESCRIBED TOTAL DOSE: 5040 CGRAY
RAD ONC MSQ PRESCRIPTION PATTERN COMMENT: NORMAL
RAD ONC MSQ START DATE: NORMAL
RAD ONC MSQ TREATMENT COURSE NUMBER: 1
RAD ONC MSQ TREATMENT SITE: NORMAL

## 2024-09-24 PROCEDURE — 77336 RADIATION PHYSICS CONSULT: CPT | Performed by: STUDENT IN AN ORGANIZED HEALTH CARE EDUCATION/TRAINING PROGRAM

## 2024-09-24 PROCEDURE — 77386 HC INTENSITY-MODULATED RADIATION THERAPY (IMRT), COMPLEX: CPT | Performed by: STUDENT IN AN ORGANIZED HEALTH CARE EDUCATION/TRAINING PROGRAM

## 2024-09-25 ENCOUNTER — HOSPITAL ENCOUNTER (OUTPATIENT)
Dept: RADIATION ONCOLOGY | Facility: CLINIC | Age: 69
Setting detail: RADIATION/ONCOLOGY SERIES
Discharge: HOME | End: 2024-09-25
Payer: MEDICARE

## 2024-09-25 ENCOUNTER — RADIATION ONCOLOGY OTV (OUTPATIENT)
Dept: RADIATION ONCOLOGY | Facility: CLINIC | Age: 69
End: 2024-09-25
Payer: MEDICARE

## 2024-09-25 VITALS
BODY MASS INDEX: 32.52 KG/M2 | OXYGEN SATURATION: 97 % | DIASTOLIC BLOOD PRESSURE: 76 MMHG | SYSTOLIC BLOOD PRESSURE: 138 MMHG | RESPIRATION RATE: 18 BRPM | HEART RATE: 108 BPM | WEIGHT: 201.5 LBS | TEMPERATURE: 97.9 F

## 2024-09-25 DIAGNOSIS — C21.0 MALIGNANT NEOPLASM OF ANUS, UNSPECIFIED: ICD-10-CM

## 2024-09-25 DIAGNOSIS — C21.0 SQUAMOUS CELL CANCER, ANUS (MULTI): ICD-10-CM

## 2024-09-25 DIAGNOSIS — Z51.0 ENCOUNTER FOR ANTINEOPLASTIC RADIATION THERAPY: ICD-10-CM

## 2024-09-25 LAB
RAD ONC MSQ ACTUAL FRACTIONS DELIVERED: 25
RAD ONC MSQ ACTUAL SESSION DELIVERED DOSE: 180 CGRAY
RAD ONC MSQ ACTUAL TOTAL DOSE: 4500 CGRAY
RAD ONC MSQ ELAPSED DAYS: 35
RAD ONC MSQ LAST DATE: NORMAL
RAD ONC MSQ PRESCRIBED FRACTIONAL DOSE: 180 CGRAY
RAD ONC MSQ PRESCRIBED NUMBER OF FRACTIONS: 28
RAD ONC MSQ PRESCRIBED TECHNIQUE: NORMAL
RAD ONC MSQ PRESCRIBED TOTAL DOSE: 5040 CGRAY
RAD ONC MSQ PRESCRIPTION PATTERN COMMENT: NORMAL
RAD ONC MSQ START DATE: NORMAL
RAD ONC MSQ TREATMENT COURSE NUMBER: 1
RAD ONC MSQ TREATMENT SITE: NORMAL

## 2024-09-25 PROCEDURE — 77386 HC INTENSITY-MODULATED RADIATION THERAPY (IMRT), COMPLEX: CPT | Performed by: STUDENT IN AN ORGANIZED HEALTH CARE EDUCATION/TRAINING PROGRAM

## 2024-09-25 ASSESSMENT — PATIENT HEALTH QUESTIONNAIRE - PHQ9
1. LITTLE INTEREST OR PLEASURE IN DOING THINGS: NOT AT ALL
SUM OF ALL RESPONSES TO PHQ9 QUESTIONS 1 AND 2: 0
2. FEELING DOWN, DEPRESSED OR HOPELESS: NOT AT ALL

## 2024-09-25 ASSESSMENT — ENCOUNTER SYMPTOMS
DEPRESSION: 0
OCCASIONAL FEELINGS OF UNSTEADINESS: 0
LOSS OF SENSATION IN FEET: 0

## 2024-09-25 ASSESSMENT — PAIN SCALES - GENERAL: PAINLEVEL: 10-WORST PAIN EVER

## 2024-09-25 NOTE — PROGRESS NOTES
Radiation Oncology On Treatment Visit    Patient Name:  Raquel Palacios  MRN:  [id number]  :  1955    Referring Provider: No ref. provider found  Primary Care Provider: Jeremias Rubin DO  Care Team: Patient Care Team:  Jeremias Rubin DO as PCP - General (Family Medicine)  Cristian Álvarez MD as Medical Oncologist (Hematology and Oncology)    Date of Service: 2024     Diagnosis:   Specialty Problems          Radiation Oncology Problems    Squamous cell cancer, anus (Multi)         Treatment Summary:  IMRT: Not Applicable Anal canal, Bilateral Inguinal lymph node, Bilateral Pelvis    Treatment Period Technique Fraction Dose Fractions Total Dose   Course 1 2024-2024  (days elapsed: 35)         Anus_Pel_Ing 2024-2024 VMAT 180 / 180 cGy  4500 / 5,040 cGy     SUBJECTIVE: Diarrhea, not using imodium currently.  Severe pain in the perianal region, worse with bowel movements, has Vicodin as needed. Low energy, requiring rest. Blood noticed on baby wipes. COVID and flu PCRs negative.     OBJECTIVE:   Vital Signs:  /76 (BP Location: Left arm, Patient Position: Sitting, BP Cuff Size: Adult long)   Pulse 108   Temp 36.6 °C (97.9 °F) (Temporal)   Resp 18   Wt 91.4 kg (201 lb 8 oz)   SpO2 97%   BMI 32.52 kg/m²     Other Pertinent Findings:     Toxicity Assessment          2024    14:08 2024    11:13 2024    11:16 2024    11:07 2024    12:05 2024    11:09   Toxicity Assessment   Treatment Site Pelvis - female Pelvis - female Pelvis - female Pelvis - female Pelvis - female Pelvis - female   Anorexia Grade 0 Grade 1 Grade 1 Grade 1 Grade 0 Grade 1   Anxiety Grade 0 Grade 0 Grade 0 Grade 0 Grade 0 Grade 1   Dehydration Grade 0 Grade 0 Grade 0 Grade 0 Grade 0 Grade 0   Depression Grade 0 Grade 0 Grade 0 Grade 0 Grade 0 Grade 1   Dermatitis Radiation Grade 0 Grade 0 Grade 0  Grade 2 Grade 2   Diarrhea Grade 0 Grade 1       liquid stool this morning 4 times  then took immodium' Grade 1       off and on. 1 immodium takes care of it Grade 1       1 episode of diarrhea this morning Grade 2 Grade 2   Fatigue Grade 0 Grade 1 Grade 1 Grade 1 Grade 1 Grade 1   Fibrosis Deep Connective Tissue Grade 0 Grade 0 Grade 0 Grade 0 Grade 0 Grade 0   Fracture Grade 0 Grade 0 Grade 0 Grade 0 Grade 0 Grade 0   Nausea Grade 0 Grade 1 Grade 1 Grade 0 Grade 1 Grade 1   Pain Grade 0 Grade 0 Grade 0 Grade 1       discomfort in lower abdomen Grade 2       7/10 buttocks Grade 3       10/10 buttocks   Treatment Related Secondary Malignancy Grade 0 Grade 0 Grade 0 Grade 0 Grade 0 Grade 0   Tumor Pain Grade 0 Grade 0 Grade 0 Grade 0 Grade 0 Grade 0   Vomiting Grade 0 Grade 0 Grade 0 Grade 0 Grade 0 Grade 0   Lower Gastrointestinal Hemorrhage Grade 0 Grade 0 Grade 1       small amount this morning when wiping Grade 0       scant amount when wiping Grade 0 Grade 1       blood with wiping   Proctitis Grade 0 Grade 0 Grade 0 Grade 0 Grade 0 Grade 0   Cystitis Noninfective Grade 0 Grade 0 Grade 0 Grade 0 Grade 0 Grade 0   Urinary Incontinence Grade 0 Grade 0 Grade 0 Grade 0 Grade 0 Grade 0   Pelvic Pain Grade 0 Grade 0 Grade 0 Grade 0  Grade 0   Pelvic Soft Tissue Necrosis Grade 0 Grade 0 Grade 0 Grade 0 Grade 0 Grade 0   Neuralgia Grade 0 Grade 0 Grade 0 Grade 0 Grade 0 Grade 0   Chronic Kidney Disease Grade 0 Grade 0 Grade 0 Grade 0 Grade 0 Grade 0   Vaginal Dryness Grade 0 Grade 0 Grade 0 Grade 0 Grade 0 Grade 0   Vaginal Fistula Grade 0 Grade 0 Grade 0 Grade 0 Grade 0 Grade 0   Vaginal Hemorrhage Grade 0 Grade 0 Grade 0 Grade 0 Grade 0 Grade 0   Vaginal Stricture Grade 0 Grade 0 Grade 0 Grade 0 Grade 0 Grade 0   Lymphedema Grade 0 Grade 0 Grade 0 Grade 0 Grade 0 Grade 0   Thromboembolic Event Grade 0 Grade 0 Grade 0 Grade 0 Grade 0 Grade 0   Hot Flashes Grade 0 Grade 0 Grade 0 Grade 0 Grade 0 Grade 0          Concurrent systemic therapy: methylPREDNISolone sod succinate (SOLU-Medrol) 40 mg/mL  injection 40 mg, 40 mg, intravenous, As needed, 1 of 1 cycle        mitoMYcin (Mutamycin) 20 mg injection in 40 mL, 10 mg/m2 = 20 mg, intravenous, Once, 1 of 1 cycle    Administration: 20 mg (8/21/2024), 20 mg (9/18/2024)      Labs:   WBC   Date Value Ref Range Status   09/18/2024 3.8 (L) 4.4 - 11.3 x10*3/uL Final   09/05/2024 3.2 (L) 4.4 - 11.3 x10*3/uL Final     Hemoglobin   Date Value Ref Range Status   09/18/2024 11.4 (L) 12.0 - 16.0 g/dL Final   09/05/2024 11.8 (L) 12.0 - 16.0 g/dL Final     Hematocrit   Date Value Ref Range Status   09/18/2024 34.8 (L) 36.0 - 46.0 % Final   09/05/2024 35.8 (L) 36.0 - 46.0 % Final     Neutrophils Absolute   Date Value Ref Range Status   09/18/2024 2.97 1.20 - 7.70 x10*3/uL Final     Comment:     Percent differential counts (%) should be interpreted in the context of the absolute cell counts (cells/uL).   09/05/2024 2.12 1.20 - 7.70 x10*3/uL Final     Comment:     Percent differential counts (%) should be interpreted in the context of the absolute cell counts (cells/uL).     Platelets   Date Value Ref Range Status   09/18/2024 170 150 - 450 x10*3/uL Final   09/05/2024 110 (L) 150 - 450 x10*3/uL Final     Comment:     Platelet count verified by smear review.     Alkaline Phosphatase   Date Value Ref Range Status   09/18/2024 94 33 - 136 U/L Final   09/05/2024 78 33 - 136 U/L Final     AST   Date Value Ref Range Status   09/18/2024 21 9 - 39 U/L Final   09/05/2024 17 9 - 39 U/L Final     ALT   Date Value Ref Range Status   09/18/2024 15 7 - 45 U/L Final     Comment:     Patients treated with Sulfasalazine may generate falsely decreased results for ALT.   09/05/2024 14 7 - 45 U/L Final     Comment:     Patients treated with Sulfasalazine may generate falsely decreased results for ALT.     Bilirubin, Total   Date Value Ref Range Status   09/18/2024 0.7 0.0 - 1.2 mg/dL Final   09/05/2024 0.6 0.0 - 1.2 mg/dL Final     Glucose   Date Value Ref Range Status   09/18/2024 101 (H) 74 - 99  mg/dL Final   09/05/2024 89 74 - 99 mg/dL Final     Calcium   Date Value Ref Range Status   09/18/2024 9.0 8.6 - 10.3 mg/dL Final   09/05/2024 8.7 8.6 - 10.3 mg/dL Final     Sodium   Date Value Ref Range Status   09/18/2024 138 136 - 145 mmol/L Final   09/05/2024 141 136 - 145 mmol/L Final     Potassium   Date Value Ref Range Status   09/18/2024 3.7 3.5 - 5.3 mmol/L Final   09/05/2024 3.7 3.5 - 5.3 mmol/L Final     Bicarbonate   Date Value Ref Range Status   09/18/2024 28 21 - 32 mmol/L Final   09/05/2024 26 21 - 32 mmol/L Final     Chloride   Date Value Ref Range Status   09/18/2024 103 98 - 107 mmol/L Final   09/05/2024 105 98 - 107 mmol/L Final     Urea Nitrogen   Date Value Ref Range Status   09/18/2024 13 6 - 23 mg/dL Final   09/05/2024 9 6 - 23 mg/dL Final     Creatinine   Date Value Ref Range Status   09/18/2024 0.97 0.50 - 1.05 mg/dL Final   09/05/2024 0.81 0.50 - 1.05 mg/dL Final         Exam: Grade 2 dermatitis of the perianal skin.     Assessment / Plan:  The patient is tolerating radiation therapy as anticipated.  Continue per current treatment plan.       Therapies: Recommended increasing Vicodin use to q4-6 hrs ATC while awake, currently taking 1/2 tablet once a day.  Recommended imodim for diarrhea.  Rest as needed for fatigue. Will obtain blood work to evaluate blood counts after second [facility] infusion.    Side effects reviewed with patient. Images, chart and labs reviewed.    Jorge Padilla MD

## 2024-09-26 ENCOUNTER — HOSPITAL ENCOUNTER (OUTPATIENT)
Dept: RADIATION ONCOLOGY | Facility: CLINIC | Age: 69
Setting detail: RADIATION/ONCOLOGY SERIES
Discharge: HOME | End: 2024-09-26
Payer: MEDICARE

## 2024-09-26 DIAGNOSIS — Z51.0 ENCOUNTER FOR ANTINEOPLASTIC RADIATION THERAPY: ICD-10-CM

## 2024-09-26 DIAGNOSIS — C21.0 MALIGNANT NEOPLASM OF ANUS, UNSPECIFIED: ICD-10-CM

## 2024-09-26 LAB
RAD ONC MSQ ACTUAL FRACTIONS DELIVERED: 26
RAD ONC MSQ ACTUAL SESSION DELIVERED DOSE: 180 CGRAY
RAD ONC MSQ ACTUAL TOTAL DOSE: 4680 CGRAY
RAD ONC MSQ ELAPSED DAYS: 36
RAD ONC MSQ LAST DATE: NORMAL
RAD ONC MSQ PRESCRIBED FRACTIONAL DOSE: 180 CGRAY
RAD ONC MSQ PRESCRIBED NUMBER OF FRACTIONS: 28
RAD ONC MSQ PRESCRIBED TECHNIQUE: NORMAL
RAD ONC MSQ PRESCRIBED TOTAL DOSE: 5040 CGRAY
RAD ONC MSQ PRESCRIPTION PATTERN COMMENT: NORMAL
RAD ONC MSQ START DATE: NORMAL
RAD ONC MSQ TREATMENT COURSE NUMBER: 1
RAD ONC MSQ TREATMENT SITE: NORMAL

## 2024-09-26 PROCEDURE — 77386 HC INTENSITY-MODULATED RADIATION THERAPY (IMRT), COMPLEX: CPT | Performed by: STUDENT IN AN ORGANIZED HEALTH CARE EDUCATION/TRAINING PROGRAM

## 2024-09-27 ENCOUNTER — LAB (OUTPATIENT)
Dept: LAB | Facility: CLINIC | Age: 69
End: 2024-09-27
Payer: MEDICARE

## 2024-09-27 ENCOUNTER — HOSPITAL ENCOUNTER (OUTPATIENT)
Dept: RADIATION ONCOLOGY | Facility: CLINIC | Age: 69
Setting detail: RADIATION/ONCOLOGY SERIES
Discharge: HOME | End: 2024-09-27
Payer: MEDICARE

## 2024-09-27 DIAGNOSIS — C21.0 ANAL CANCER (MULTI): ICD-10-CM

## 2024-09-27 DIAGNOSIS — C21.0 MALIGNANT NEOPLASM OF ANUS, UNSPECIFIED: ICD-10-CM

## 2024-09-27 DIAGNOSIS — Z51.0 ENCOUNTER FOR ANTINEOPLASTIC RADIATION THERAPY: ICD-10-CM

## 2024-09-27 LAB
ALBUMIN SERPL BCP-MCNC: 3.7 G/DL (ref 3.4–5)
ALP SERPL-CCNC: 74 U/L (ref 33–136)
ALT SERPL W P-5'-P-CCNC: 12 U/L (ref 7–45)
ANION GAP SERPL CALC-SCNC: 11 MMOL/L (ref 10–20)
AST SERPL W P-5'-P-CCNC: 16 U/L (ref 9–39)
BASOPHILS # BLD AUTO: 0 X10*3/UL (ref 0–0.1)
BASOPHILS NFR BLD AUTO: 0 %
BILIRUB SERPL-MCNC: 0.6 MG/DL (ref 0–1.2)
BUN SERPL-MCNC: 11 MG/DL (ref 6–23)
CALCIUM SERPL-MCNC: 8.2 MG/DL (ref 8.6–10.3)
CHLORIDE SERPL-SCNC: 97 MMOL/L (ref 98–107)
CO2 SERPL-SCNC: 29 MMOL/L (ref 21–32)
CREAT SERPL-MCNC: 0.94 MG/DL (ref 0.5–1.05)
EGFRCR SERPLBLD CKD-EPI 2021: 67 ML/MIN/1.73M*2
EOSINOPHIL # BLD AUTO: 0.01 X10*3/UL (ref 0–0.7)
EOSINOPHIL NFR BLD AUTO: 0.3 %
ERYTHROCYTE [DISTWIDTH] IN BLOOD BY AUTOMATED COUNT: 18.1 % (ref 11.5–14.5)
GLUCOSE SERPL-MCNC: 107 MG/DL (ref 74–99)
HCT VFR BLD AUTO: 29.4 % (ref 36–46)
HGB BLD-MCNC: 9.8 G/DL (ref 12–16)
IMM GRANULOCYTES # BLD AUTO: 0.01 X10*3/UL (ref 0–0.7)
IMM GRANULOCYTES NFR BLD AUTO: 0.3 % (ref 0–0.9)
LYMPHOCYTES # BLD AUTO: 0.33 X10*3/UL (ref 1.2–4.8)
LYMPHOCYTES NFR BLD AUTO: 11.4 %
MCH RBC QN AUTO: 30.4 PG (ref 26–34)
MCHC RBC AUTO-ENTMCNC: 33.3 G/DL (ref 32–36)
MCV RBC AUTO: 91 FL (ref 80–100)
MONOCYTES # BLD AUTO: 0.39 X10*3/UL (ref 0.1–1)
MONOCYTES NFR BLD AUTO: 13.5 %
NEUTROPHILS # BLD AUTO: 2.15 X10*3/UL (ref 1.2–7.7)
NEUTROPHILS NFR BLD AUTO: 74.5 %
NRBC BLD-RTO: 0 /100 WBCS (ref 0–0)
PLATELET # BLD AUTO: 116 X10*3/UL (ref 150–450)
POTASSIUM SERPL-SCNC: 3.7 MMOL/L (ref 3.5–5.3)
PROT SERPL-MCNC: 5.9 G/DL (ref 6.4–8.2)
RAD ONC MSQ ACTUAL FRACTIONS DELIVERED: 27
RAD ONC MSQ ACTUAL SESSION DELIVERED DOSE: 180 CGRAY
RAD ONC MSQ ACTUAL TOTAL DOSE: 4860 CGRAY
RAD ONC MSQ ELAPSED DAYS: 37
RAD ONC MSQ LAST DATE: NORMAL
RAD ONC MSQ PRESCRIBED FRACTIONAL DOSE: 180 CGRAY
RAD ONC MSQ PRESCRIBED NUMBER OF FRACTIONS: 28
RAD ONC MSQ PRESCRIBED TECHNIQUE: NORMAL
RAD ONC MSQ PRESCRIBED TOTAL DOSE: 5040 CGRAY
RAD ONC MSQ PRESCRIPTION PATTERN COMMENT: NORMAL
RAD ONC MSQ START DATE: NORMAL
RAD ONC MSQ TREATMENT COURSE NUMBER: 1
RAD ONC MSQ TREATMENT SITE: NORMAL
RBC # BLD AUTO: 3.22 X10*6/UL (ref 4–5.2)
SODIUM SERPL-SCNC: 133 MMOL/L (ref 136–145)
WBC # BLD AUTO: 2.9 X10*3/UL (ref 4.4–11.3)

## 2024-09-27 PROCEDURE — 80053 COMPREHEN METABOLIC PANEL: CPT

## 2024-09-27 PROCEDURE — 77386 HC INTENSITY-MODULATED RADIATION THERAPY (IMRT), COMPLEX: CPT | Performed by: STUDENT IN AN ORGANIZED HEALTH CARE EDUCATION/TRAINING PROGRAM

## 2024-09-27 PROCEDURE — 85025 COMPLETE CBC W/AUTO DIFF WBC: CPT

## 2024-09-27 PROCEDURE — 36415 COLL VENOUS BLD VENIPUNCTURE: CPT

## 2024-09-30 ENCOUNTER — HOSPITAL ENCOUNTER (OUTPATIENT)
Dept: RADIATION ONCOLOGY | Facility: CLINIC | Age: 69
Setting detail: RADIATION/ONCOLOGY SERIES
Discharge: HOME | End: 2024-09-30
Payer: MEDICARE

## 2024-09-30 ENCOUNTER — RADIATION ONCOLOGY OTV (OUTPATIENT)
Dept: RADIATION ONCOLOGY | Facility: CLINIC | Age: 69
End: 2024-09-30
Payer: MEDICARE

## 2024-09-30 ENCOUNTER — DOCUMENTATION (OUTPATIENT)
Dept: RADIATION ONCOLOGY | Facility: CLINIC | Age: 69
End: 2024-09-30

## 2024-09-30 VITALS
WEIGHT: 201.5 LBS | DIASTOLIC BLOOD PRESSURE: 70 MMHG | TEMPERATURE: 96.8 F | BODY MASS INDEX: 32.52 KG/M2 | SYSTOLIC BLOOD PRESSURE: 127 MMHG | HEART RATE: 109 BPM | RESPIRATION RATE: 18 BRPM | OXYGEN SATURATION: 91 %

## 2024-09-30 DIAGNOSIS — Z51.0 ENCOUNTER FOR ANTINEOPLASTIC RADIATION THERAPY: ICD-10-CM

## 2024-09-30 DIAGNOSIS — C21.0 MALIGNANT NEOPLASM OF ANUS, UNSPECIFIED: ICD-10-CM

## 2024-09-30 LAB
RAD ONC MSQ ACTUAL FRACTIONS DELIVERED: 28
RAD ONC MSQ ACTUAL SESSION DELIVERED DOSE: 180 CGRAY
RAD ONC MSQ ACTUAL TOTAL DOSE: 5040 CGRAY
RAD ONC MSQ ELAPSED DAYS: 40
RAD ONC MSQ LAST DATE: NORMAL
RAD ONC MSQ PRESCRIBED FRACTIONAL DOSE: 180 CGRAY
RAD ONC MSQ PRESCRIBED NUMBER OF FRACTIONS: 28
RAD ONC MSQ PRESCRIBED TECHNIQUE: NORMAL
RAD ONC MSQ PRESCRIBED TOTAL DOSE: 5040 CGRAY
RAD ONC MSQ PRESCRIPTION PATTERN COMMENT: NORMAL
RAD ONC MSQ START DATE: NORMAL
RAD ONC MSQ TREATMENT COURSE NUMBER: 1
RAD ONC MSQ TREATMENT SITE: NORMAL

## 2024-09-30 PROCEDURE — 77386 HC INTENSITY-MODULATED RADIATION THERAPY (IMRT), COMPLEX: CPT | Performed by: STUDENT IN AN ORGANIZED HEALTH CARE EDUCATION/TRAINING PROGRAM

## 2024-09-30 ASSESSMENT — ENCOUNTER SYMPTOMS
DEPRESSION: 0
OCCASIONAL FEELINGS OF UNSTEADINESS: 0
LOSS OF SENSATION IN FEET: 0

## 2024-09-30 ASSESSMENT — PAIN SCALES - GENERAL: PAINLEVEL: 7

## 2024-09-30 NOTE — PROGRESS NOTES
Radiation Oncology On Treatment Visit    Patient Name:  Raquel Palacios  MRN:  [id number]  :  1955    Referring Provider: No ref. provider found  Primary Care Provider: Jeremias Rubin DO  Care Team: Patient Care Team:  Jeremias Rubin DO as PCP - General (Family Medicine)  Cristian Álvarez MD as Medical Oncologist (Hematology and Oncology)    Date of Service: 2024     Diagnosis:   Specialty Problems          Radiation Oncology Problems    Squamous cell cancer, anus (Multi)         Treatment Summary:  IMRT: Not Applicable Anal canal, Bilateral Inguinal lymph node, Bilateral Pelvis    Treatment Period Technique Fraction Dose Fractions Total Dose   Course 1 2024-2024  (days elapsed: 40)         Anus_Pel_Ing 2024-2024 VMAT 180 / 180 cGy  5040 / 5,040 cGy     SUBJECTIVE: Low appetite, using essentials ointment for dermatitis, continuing to use one half of Percocet as needed for pain daily with 7/10 severe pain.  Nausea despite Zofran.      OBJECTIVE:   Vital Signs:  /70 (BP Location: Left arm, Patient Position: Sitting, BP Cuff Size: Large adult)   Pulse 109   Temp 36 °C (96.8 °F) (Temporal)   Resp 18   Wt 91.4 kg (201 lb 8 oz)   SpO2 91%   BMI 32.52 kg/m²     Other Pertinent Findings:     Toxicity Assessment          2024    11:13 2024    11:16 2024    11:07 2024    12:05 2024    11:09 2024    11:12   Toxicity Assessment   Treatment Site Pelvis - female Pelvis - female Pelvis - female Pelvis - female Pelvis - female Pelvis - female   Anorexia Grade 1 Grade 1 Grade 1 Grade 0 Grade 1 Grade 1   Anxiety Grade 0 Grade 0 Grade 0 Grade 0 Grade 1 Grade 0   Dehydration Grade 0 Grade 0 Grade 0 Grade 0 Grade 0 Grade 0   Depression Grade 0 Grade 0 Grade 0 Grade 0 Grade 1 Grade 0   Dermatitis Radiation Grade 0 Grade 0  Grade 2 Grade 2 Grade 2   Diarrhea Grade 1       liquid stool this morning 4 times then took immodium' Grade 1       off and on. 1  immodium takes care of it Grade 1       1 episode of diarrhea this morning Grade 2 Grade 2 Grade 2   Fatigue Grade 1 Grade 1 Grade 1 Grade 1 Grade 1 Grade 1   Fibrosis Deep Connective Tissue Grade 0 Grade 0 Grade 0 Grade 0 Grade 0 Grade 0   Fracture Grade 0 Grade 0 Grade 0 Grade 0 Grade 0 Grade 0   Nausea Grade 1 Grade 1 Grade 0 Grade 1 Grade 1 Grade 1   Pain Grade 0 Grade 0 Grade 1       discomfort in lower abdomen Grade 2       7/10 buttocks Grade 3       10/10 buttocks Grade 2       7/10   Treatment Related Secondary Malignancy Grade 0 Grade 0 Grade 0 Grade 0 Grade 0 Grade 0   Tumor Pain Grade 0 Grade 0 Grade 0 Grade 0 Grade 0 Grade 0   Vomiting Grade 0 Grade 0 Grade 0 Grade 0 Grade 0 Grade 0   Lower Gastrointestinal Hemorrhage Grade 0 Grade 1       small amount this morning when wiping Grade 0       scant amount when wiping Grade 0 Grade 1       blood with wiping Grade 0       blood with wiping   Proctitis Grade 0 Grade 0 Grade 0 Grade 0 Grade 0 Grade 0   Cystitis Noninfective Grade 0 Grade 0 Grade 0 Grade 0 Grade 0 Grade 0   Urinary Incontinence Grade 0 Grade 0 Grade 0 Grade 0 Grade 0 Grade 0   Pelvic Pain Grade 0 Grade 0 Grade 0  Grade 0 Grade 0   Pelvic Soft Tissue Necrosis Grade 0 Grade 0 Grade 0 Grade 0 Grade 0 Grade 0   Neuralgia Grade 0 Grade 0 Grade 0 Grade 0 Grade 0 Grade 0   Chronic Kidney Disease Grade 0 Grade 0 Grade 0 Grade 0 Grade 0 Grade 0   Vaginal Dryness Grade 0 Grade 0 Grade 0 Grade 0 Grade 0 Grade 0   Vaginal Fistula Grade 0 Grade 0 Grade 0 Grade 0 Grade 0 Grade 0   Vaginal Hemorrhage Grade 0 Grade 0 Grade 0 Grade 0 Grade 0 Grade 0   Vaginal Stricture Grade 0 Grade 0 Grade 0 Grade 0 Grade 0 Grade 0   Lymphedema Grade 0 Grade 0 Grade 0 Grade 0 Grade 0 Grade 0   Thromboembolic Event Grade 0 Grade 0 Grade 0 Grade 0 Grade 0 Grade 0   Hot Flashes Grade 0 Grade 0 Grade 0 Grade 0 Grade 0 Grade 0        Concurrent systemic therapy: methylPREDNISolone sod succinate (SOLU-Medrol) 40 mg/mL injection 40  mg, 40 mg, intravenous, As needed, 1 of 1 cycle        mitoMYcin (Mutamycin) 20 mg injection in 40 mL, 10 mg/m2 = 20 mg, intravenous, Once, 1 of 1 cycle    Administration: 20 mg (8/21/2024), 20 mg (9/18/2024)    Oral Xeloda    Labs:   WBC   Date Value Ref Range Status   09/27/2024 2.9 (L) 4.4 - 11.3 x10*3/uL Final   09/18/2024 3.8 (L) 4.4 - 11.3 x10*3/uL Final     Hemoglobin   Date Value Ref Range Status   09/27/2024 9.8 (L) 12.0 - 16.0 g/dL Final   09/18/2024 11.4 (L) 12.0 - 16.0 g/dL Final     Hematocrit   Date Value Ref Range Status   09/27/2024 29.4 (L) 36.0 - 46.0 % Final   09/18/2024 34.8 (L) 36.0 - 46.0 % Final     Neutrophils Absolute   Date Value Ref Range Status   09/27/2024 2.15 1.20 - 7.70 x10*3/uL Final     Comment:     Percent differential counts (%) should be interpreted in the context of the absolute cell counts (cells/uL).   09/18/2024 2.97 1.20 - 7.70 x10*3/uL Final     Comment:     Percent differential counts (%) should be interpreted in the context of the absolute cell counts (cells/uL).     Platelets   Date Value Ref Range Status   09/27/2024 116 (L) 150 - 450 x10*3/uL Final   09/18/2024 170 150 - 450 x10*3/uL Final     Alkaline Phosphatase   Date Value Ref Range Status   09/27/2024 74 33 - 136 U/L Final   09/18/2024 94 33 - 136 U/L Final     AST   Date Value Ref Range Status   09/27/2024 16 9 - 39 U/L Final   09/18/2024 21 9 - 39 U/L Final     ALT   Date Value Ref Range Status   09/27/2024 12 7 - 45 U/L Final     Comment:     Patients treated with Sulfasalazine may generate falsely decreased results for ALT.   09/18/2024 15 7 - 45 U/L Final     Comment:     Patients treated with Sulfasalazine may generate falsely decreased results for ALT.     Bilirubin, Total   Date Value Ref Range Status   09/27/2024 0.6 0.0 - 1.2 mg/dL Final   09/18/2024 0.7 0.0 - 1.2 mg/dL Final     Glucose   Date Value Ref Range Status   09/27/2024 107 (H) 74 - 99 mg/dL Final   09/18/2024 101 (H) 74 - 99 mg/dL Final      Calcium   Date Value Ref Range Status   09/27/2024 8.2 (L) 8.6 - 10.3 mg/dL Final   09/18/2024 9.0 8.6 - 10.3 mg/dL Final     Sodium   Date Value Ref Range Status   09/27/2024 133 (L) 136 - 145 mmol/L Final   09/18/2024 138 136 - 145 mmol/L Final     Potassium   Date Value Ref Range Status   09/27/2024 3.7 3.5 - 5.3 mmol/L Final   09/18/2024 3.7 3.5 - 5.3 mmol/L Final     Bicarbonate   Date Value Ref Range Status   09/27/2024 29 21 - 32 mmol/L Final   09/18/2024 28 21 - 32 mmol/L Final     Chloride   Date Value Ref Range Status   09/27/2024 97 (L) 98 - 107 mmol/L Final   09/18/2024 103 98 - 107 mmol/L Final     Urea Nitrogen   Date Value Ref Range Status   09/27/2024 11 6 - 23 mg/dL Final   09/18/2024 13 6 - 23 mg/dL Final     Creatinine   Date Value Ref Range Status   09/27/2024 0.94 0.50 - 1.05 mg/dL Final   09/18/2024 0.97 0.50 - 1.05 mg/dL Final         Exam: Mild distress in wheelchair     Assessment / Plan:  The patient is tolerating radiation therapy as anticipated.  Continue per current treatment plan.       Therapies: Encouraged 0.5 percocet ATC q4-6 hours for pain to limit effects on cognition. Recommended alternation of zofran and compazine for nausea.     Side effects reviewed with patient. Images, chart and labs reviewed.    Follow up in 2 weeks for toxicity assessment.    Jorge Padilla MD

## 2024-10-03 ENCOUNTER — TELEPHONE (OUTPATIENT)
Dept: HEMATOLOGY/ONCOLOGY | Facility: HOSPITAL | Age: 69
End: 2024-10-03
Payer: MEDICARE

## 2024-10-03 NOTE — TELEPHONE ENCOUNTER
Pt called in with questions on where she could destroy her last few chemo pills. Pt made aware she can take the meds to [facility] ambulance department, in [place], for disposal. Pt states she understand and knows where the facility is located.    normal...

## 2024-10-07 ENCOUNTER — SPECIALTY PHARMACY (OUTPATIENT)
Dept: PHARMACY | Facility: CLINIC | Age: 69
End: 2024-10-07

## 2024-10-17 ENCOUNTER — HOSPITAL ENCOUNTER (OUTPATIENT)
Dept: RADIATION ONCOLOGY | Facility: CLINIC | Age: 69
Setting detail: RADIATION/ONCOLOGY SERIES
Discharge: HOME | End: 2024-10-17
Payer: MEDICARE

## 2024-10-17 VITALS
TEMPERATURE: 97.7 F | DIASTOLIC BLOOD PRESSURE: 81 MMHG | BODY MASS INDEX: 29.8 KG/M2 | RESPIRATION RATE: 18 BRPM | WEIGHT: 184.63 LBS | OXYGEN SATURATION: 98 % | HEART RATE: 103 BPM | SYSTOLIC BLOOD PRESSURE: 148 MMHG

## 2024-10-17 DIAGNOSIS — C21.0 SQUAMOUS CELL CANCER, ANUS (MULTI): Primary | ICD-10-CM

## 2024-10-17 PROCEDURE — 99213 OFFICE O/P EST LOW 20 MIN: CPT | Performed by: STUDENT IN AN ORGANIZED HEALTH CARE EDUCATION/TRAINING PROGRAM

## 2024-10-17 PROCEDURE — 99211 OFF/OP EST MAY X REQ PHY/QHP: CPT | Performed by: STUDENT IN AN ORGANIZED HEALTH CARE EDUCATION/TRAINING PROGRAM

## 2024-10-17 PROCEDURE — G2211 COMPLEX E/M VISIT ADD ON: HCPCS | Performed by: STUDENT IN AN ORGANIZED HEALTH CARE EDUCATION/TRAINING PROGRAM

## 2024-10-17 ASSESSMENT — ENCOUNTER SYMPTOMS
VOMITING: 0
CHILLS: 0
NUMBNESS: 0
DEPRESSION: 0
HEADACHES: 0
ADENOPATHY: 0
CONSTIPATION: 0
DIARRHEA: 1
LEG SWELLING: 0
WOUND: 0
LOSS OF SENSATION IN FEET: 0
SHORTNESS OF BREATH: 0
FREQUENCY: 0
OCCASIONAL FEELINGS OF UNSTEADINESS: 0
EXTREMITY WEAKNESS: 0
FEVER: 0
NAUSEA: 1
UNEXPECTED WEIGHT CHANGE: 0
DYSURIA: 0
FATIGUE: 1
BACK PAIN: 0
RECTAL PAIN: 1
ARTHRALGIAS: 0

## 2024-10-17 ASSESSMENT — PAIN SCALES - GENERAL: PAINLEVEL_OUTOF10: 5

## 2024-10-17 NOTE — PROGRESS NOTES
"Radiation Oncology Follow-Up    Patient Name:  Raquel Palacios  MRN:  [id number]  :  1955    Referring Provider: Jorge Padilla MD  Primary Care Provider: Jeremias Rubin DO  Care Team: Patient Care Team:  Jeremias Rubin DO as PCP - General (Family Medicine)  Cristian Álvarez MD as Medical Oncologist (Hematology and Oncology)    Date of Service: 10/17/2024    SUBJECTIVE  History of Present Illness:  Raquel Palacios \"Mary\" is a 69 y.o. female who was previously seen at the [facility] Department of Radiation Oncology for anal squamous cell carcinoma.      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 Palacios is a female that noted a perianal lesion in 2020 for which had become uncomfortable and swollen.  The patient had a prior history of negative colonoscopy approximately 4 years ago.  The patient was seen by general surgery for repeat colonoscopy.  The patient had evaluation which showed a anal lesion which was biopsied revealing invasive squamous cell carcinoma, moderately differentiated.  The patient underwent colonoscopy on 2024 which showed a adenomatous appearing mass at the posterior anal region covered one quarter of the circumference biopsy was obtained, diverticulosis and medium protruding hemorrhoid was noted.  Biopsy of the anus revealed squamous cell carcinoma, p16 strongly positive.     The patient underwent staging CT chest/abdomen/pelvis with IV contrast on 2024 which showed no evidence of pulmonary nodules, 1.1 cm left hepatic lobe and 9 mm right hepatic lobe hepatic cysts, diverticulosis and no evidence of osseous metastatic disease.  The patient underwent PET/CT on 2024 which revealed focal uptake in the left parotid gland consistent with Warthin tumor, focal uptake in the anus with max SUV 17.5 and no evidence of hypermetabolic abdominal pelvic or inguinal " lymphadenopathy.  On 2024 the patient underwent MRI rectum which showed an inferior anal canal extending to the perianal space a lobulated lesion measuring 2.6 x 1.3 x 2.2 cm, below the dentate line.  No evidence of pelvic or inguinal lymphadenopathy.    The patient completed concurrent chemoradiation therapy with concurrent Xeloda and Mitomycin-C with simultaneous integrated boost technique 5040 cGy in 28 fractions to the primary and to the elective pelvis and bilateral inguinal lymph nodes 4200 cGy in 28 fractions from 2024-2024.    10/17/2024: Moderate anal pain, using Vicodin 0.5-1 tablets daily for pain. Issues with appetite change, starting to incorporate more solid food.  Using essentials cream still. Nausea related to Vicodin, using medical mariajuana and Zofran.    Labs:  None     Pathology:   2024-A. ANUS BIOPSY: Squamous cell carcinoma, Note: Immunostain for p40 is positive and immunostain for p16 is diffusely positive (strong).     7/10/2024-A.  Anus, biopsy: -Invasive squamous cell carcinoma, moderately differentiated.      2024-Pap smear: Negative for intraepithelial lesion or malignancy.  Negative for HPV testing.     Disease Associated Genomics:  P16 positive     Disease Tumor Markers:   None     Imagin2024-MRI rectum: In the area of the inferior anal canal extending to the perianal space a lobulated lesion measuring 2.6 x 1.3 x 2.2 cm, below the dentate line.  No evidence of pelvic or inguinal lymphadenopathy.     2024-PET/CT: No evidence of focal hypermetabolic cervical lymphadenopathy.  Focal uptake in the left parotid gland with max SUV 4.5 consistent with Warthin tumor.  No evidence of focal hypermetabolic lesions of the lung, mediastinum, hilar or axilla.  There is focal uptake in the anus with max SUV 17.5.  No evidence of hypermetabolic lymphadenopathy.  There is no evidence of hypermetabolic osseous metastatic disease.     2024-CT  chest/abdomen/pelvis with IV contrast: No evidence of pulmonary nodules, prominent but nonenlarged mediastinal lymph nodes including right lower paratracheal lymph node.  A 1.1 cm lesion in the left hepatic lobe and a 9 mm lesion within the right hepatic lobe likely hepatic cyst.  Diverticulosis without evidence of diverticulitis.  Anal lesion is not well-visualized.  Status post appendectomy.  No evidence of suspicious osseous metastatic disease.     Prior Systemic Therapies:  8/21/2024-9/30/2024: Mitomycin-C and Xeloda with radiation therapy     Prior Surgeries:  7/11/2024-colonoscopy: Single adenomatous appearing mass at the posterior anal region covering one quarter of the circumference, biopsy was obtained.  Medium protruding hemorrhoid, diverticulosis of the ascending colon, hepatic flexure, transverse colon, descending colon and sigmoid colon.     Prior Radiation Therapy:   8/21/2024-9/30/2024: Simultaneous integrated boost technique 5040 cGy in 28 fractions to the primary and to the elective pelvis and bilateral inguinal lymph nodes 4200 cGy in 28 fractions    Treatment Rendered:   IMRT: Not Applicable Anal canal, Bilateral Inguinal lymph node, Bilateral Pelvis    Treatment Period Technique Fraction Dose Fractions Total Dose   Course 1 8/21/2024-9/30/2024  (days elapsed: 40)         Anus_Pel_Ing 8/21/2024-9/30/2024 VMAT 180 / 180 cGy 28 / 28 5040 / 5,040 cGy       Review of Systems:   Review of Systems   Constitutional:  Positive for fatigue (Improved with rest). Negative for chills, fever and unexpected weight change.   Respiratory:  Negative for shortness of breath.    Cardiovascular:  Negative for chest pain and leg swelling.   Gastrointestinal:  Positive for diarrhea (1-2 looser / watery bowel movements), nausea (from vicodin, no significant relief from zofran) and rectal pain (Feels the lesion, mild pains with wiping). Negative for constipation and vomiting.   Genitourinary:  Negative for bladder  incontinence, dysuria, frequency, vaginal bleeding and vaginal discharge.    Musculoskeletal:  Negative for arthralgias and back pain.   Skin:  Negative for wound.   Neurological:  Negative for extremity weakness, headaches and numbness.   Hematological:  Negative for adenopathy.       Performance Status:   The Karnofsky performance scale today is 80, Normal activity with effort; some signs or symptoms of disease (ECOG equivalent 1).       OBJECTIVE  Vital Signs:  /81   Pulse 103   Temp 36.5 °C (97.7 °F)   Resp 18   Wt 83.7 kg (184 lb 10.2 oz)   SpO2 98%   BMI 29.80 kg/m²   Physical Exam  Vitals and nursing note reviewed. Exam conducted with a chaperone present.   HENT:      Head: Normocephalic.   Eyes:      Extraocular Movements: Extraocular movements intact.   Cardiovascular:      Rate and Rhythm: Normal rate and regular rhythm.   Pulmonary:      Effort: Pulmonary effort is normal.   Genitourinary:     Labia:         Right: No rash or lesion.         Left: No rash or lesion.           Comments: Perirectal skin grade 2 radiation dermatitis  Musculoskeletal:         General: Normal range of motion.      Right lower leg: No edema.      Left lower leg: No edema.   Neurological:      Mental Status: She is alert.            ASSESSMENT:   Raquel Palacios is a 69 y.o. female with Squamous cell cancer, anus (Multi), Clinical: Stage IIA (cT2, cN0, cM0).      Ms. 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    The patient is still recovering from acute toxicities including fatigue which is improved with rest, moderate pain using Vicodin and nausea related to Vicodin using Zofran and medical marijuana.  The patient is currently using essentials ointments for her grade 2 radiation dermatitis.  I discussed with the patient follow-up earlier if needed due to toxicities, otherwise her radiation symptoms will likely improve  over the next few weeks to months.      The patient is scheduled for follow-up CT chest/abdomen/pelvis with medical oncology in 12/2024.  The patient is scheduled for follow-up with colorectal surgery for anoscopy in 12/2024.    The patient will be referred for repeat MRI of the rectum at 6 months status post chemoradiation therapy for evaluation.  The patient will be followed with yearly MRIs of the rectum for 3 years given her T2 disease.  The patient will be scheduled for clinical examination in late March or early April 2025, or earlier if needed.    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  -Follow up in 4/2025 with 6-month repeat MRI rectum to evaluate primary lesion post radiation therapy  -Following with medical oncology in 12/2024 with CT chest/abdomen/pelvis  -Following with colorectal surgery in 12/2024 for examination and anoscopy  -Status post chemoradiation therapy    #) Acute toxicities  -Fatigue: Grade 1 improved with rest  -Pain: Grade 2, using Vicodin  -Nausea: Grade 2, using Zofran and medical marijuana  -Radiation dermatitis: Grade 2, using essentials ointment, sitz bath, Vicodin    #) Long term side effects    A total of 20 minutes were spent face-to-face with the patient, with an additional 10 minutes spent reviewing records including imaging, pathology and physician notes.    Jorge Padilla MD  [facility] [place]  [facility]  - Department of Radiation Oncology  Phone: [phone]  Fax: [phone]  [employer] secure chat preferred / Pager [phone]    Note: This was transcribed using Dragon voice recognition software. Attempts were made to correct any errors; however, errors or omissions may be present.     NCCN Guidelines were applicable to guide this patients treatment plan.

## 2024-10-17 NOTE — PROGRESS NOTES
Radiation Oncology Nursing Note    Pain: The patient's current pain level was assessed.  They report currently having a pain of 5 out of 10.  They feel their pain is under control with the use of pain medications.    Review of Systems:  Review of Systems - Oncology

## 2024-10-17 NOTE — PROGRESS NOTES
Patient is in today for follow up visit. Her last radiation treatment was 9/30/24. Today the patient reports pain 5/10 in buttock area. She states that it still is very painful to go to the bathroom. The patient also reports fatigue and that she is starting to eat some more foods other then liquids and boost/ensure. She will see Pavel 12/2, have CT 12/11, and see Henri on 12/18.

## 2024-10-22 NOTE — PROGRESS NOTES
Radiation Oncology Treatment Summary    Patient Name:  Raquel Palacios  MRN:  [id number]  :  1955    Referring Provider: Jason Bell MD  Primary Care Provider: Jeremias Rubin DO    Brief History: Please see the radiation oncology consultation note.  Briefly, Raquel Palacios is a 69 y.o. female with Squamous cell cancer, anus (Multi), Clinical: Stage IIA (cT2, cN0, cM0).  The patient completed radiotherapy as outlined below.    The radiation planning and treatment procedures were explained to the patient in advance, and all questions were answered. The benefits and goals of treatment, options and alternatives, limitations, side effects and risks of radiation were also explained. The patient provided informed consent.    Technical Summary:  Region(s) Treated: Anus and pelvis  Radiation Dose Prescribed: Integrated boost technique 5040 cGy in 28 fractions to the primary mass with 4200 cGy in 28 fractions to the elective pelvis  Radiation Technique/Machine/Energy Used: VMAT / Truebeam /6 MV photons  Additional radiation technical details available in our radiation oncology EMR [facility] and our treatment planning system.    Radiation Treatment Summary:    IMRT: Not Applicable Anal canal, Bilateral Inguinal lymph node, Bilateral Pelvis    Treatment Period Technique Fraction Dose Fractions Total Dose   Course 1 2024-2024  (days elapsed: 40)         Anus_Pel_Ing 2024-2024 VMAT 180 / 180 cGy  5040 / 5,040 cGy       Please see the patient's [facility] chart for further details regarding the radiation plan, including beam energy.    Concurrent Chemotherapy:  Treatment Plans       Name Type Plan Dates Plan Provider         Active    Capecitabine / MitoMYcin (10 mg/m2) with Concurrent Radiation, 42 Day Cycle Oncology Treatment 2024 - Present Cristian Álvarez MD                    Clinical Summary:  The patient tolerated this course of radiation therapy relatively well, with no unusual  events or unanticipated toxicities. Symptoms during treatment included grade 2 diarrhea treated with diet modification and Imodium, grade 1 fatigue improved with rest, nausea treated with antiemetics, moderate pain treated with [place].    CTCAE Toxicity Overview:   Toxicity Assessment          8/28/2024    11:13 9/4/2024    11:16 9/11/2024    11:07 9/18/2024    12:05 9/25/2024    11:09 9/30/2024    11:12   Toxicity Assessment   Treatment Site Pelvis - female Pelvis - female Pelvis - female Pelvis - female Pelvis - female Pelvis - female   Anorexia Grade 1 Grade 1 Grade 1 Grade 0 Grade 1 Grade 1   Anxiety Grade 0 Grade 0 Grade 0 Grade 0 Grade 1 Grade 0   Dehydration Grade 0 Grade 0 Grade 0 Grade 0 Grade 0 Grade 0   Depression Grade 0 Grade 0 Grade 0 Grade 0 Grade 1 Grade 0   Dermatitis Radiation Grade 0 Grade 0  Grade 2 Grade 2 Grade 2   Diarrhea Grade 1       liquid stool this morning 4 times then took immodium' Grade 1       off and on. 1 immodium takes care of it Grade 1       1 episode of diarrhea this morning Grade 2 Grade 2 Grade 2   Fatigue Grade 1 Grade 1 Grade 1 Grade 1 Grade 1 Grade 1   Fibrosis Deep Connective Tissue Grade 0 Grade 0 Grade 0 Grade 0 Grade 0 Grade 0   Fracture Grade 0 Grade 0 Grade 0 Grade 0 Grade 0 Grade 0   Nausea Grade 1 Grade 1 Grade 0 Grade 1 Grade 1 Grade 1   Pain Grade 0 Grade 0 Grade 1       discomfort in lower abdomen Grade 2       7/10 buttocks Grade 3       10/10 buttocks Grade 2       7/10   Treatment Related Secondary Malignancy Grade 0 Grade 0 Grade 0 Grade 0 Grade 0 Grade 0   Tumor Pain Grade 0 Grade 0 Grade 0 Grade 0 Grade 0 Grade 0   Vomiting Grade 0 Grade 0 Grade 0 Grade 0 Grade 0 Grade 0   Lower Gastrointestinal Hemorrhage Grade 0 Grade 1       small amount this morning when wiping Grade 0       scant amount when wiping Grade 0 Grade 1       blood with wiping Grade 0       blood with wiping   Proctitis Grade 0 Grade 0 Grade 0 Grade 0 Grade 0 Grade 0   Cystitis Noninfective  Grade 0 Grade 0 Grade 0 Grade 0 Grade 0 Grade 0   Urinary Incontinence Grade 0 Grade 0 Grade 0 Grade 0 Grade 0 Grade 0   Pelvic Pain Grade 0 Grade 0 Grade 0  Grade 0 Grade 0   Pelvic Soft Tissue Necrosis Grade 0 Grade 0 Grade 0 Grade 0 Grade 0 Grade 0   Neuralgia Grade 0 Grade 0 Grade 0 Grade 0 Grade 0 Grade 0   Chronic Kidney Disease Grade 0 Grade 0 Grade 0 Grade 0 Grade 0 Grade 0   Vaginal Dryness Grade 0 Grade 0 Grade 0 Grade 0 Grade 0 Grade 0   Vaginal Fistula Grade 0 Grade 0 Grade 0 Grade 0 Grade 0 Grade 0   Vaginal Hemorrhage Grade 0 Grade 0 Grade 0 Grade 0 Grade 0 Grade 0   Vaginal Stricture Grade 0 Grade 0 Grade 0 Grade 0 Grade 0 Grade 0   Lymphedema Grade 0 Grade 0 Grade 0 Grade 0 Grade 0 Grade 0   Thromboembolic Event Grade 0 Grade 0 Grade 0 Grade 0 Grade 0 Grade 0   Hot Flashes Grade 0 Grade 0 Grade 0 Grade 0 Grade 0 Grade 0     Patient Disposition:   The patient will be scheduled for follow-up at our clinic on 10/17/24. The patient was encouraged to contact us for any questions or concerns in the interim.     Jorge Padilla MD  [facility] [place]  [facility]  - Department of Radiation Oncology  Phone: [phone]  Fax: [phone]  [facility] secure chat preferred

## 2024-12-02 ENCOUNTER — APPOINTMENT (OUTPATIENT)
Dept: SURGERY | Facility: CLINIC | Age: 69
End: 2024-12-02
Payer: MEDICARE

## 2024-12-06 PROBLEM — R11.0 NAUSEA: Status: RESOLVED | Noted: 2022-02-18 | Resolved: 2024-12-06

## 2024-12-10 ENCOUNTER — LAB (OUTPATIENT)
Dept: LAB | Facility: LAB | Age: 69
End: 2024-12-10
Payer: MEDICARE

## 2024-12-10 DIAGNOSIS — C21.0 SQUAMOUS CELL CANCER, ANUS (MULTI): Primary | ICD-10-CM

## 2024-12-10 DIAGNOSIS — C21.0 SQUAMOUS CELL CANCER, ANUS (MULTI): ICD-10-CM

## 2024-12-10 LAB
ALBUMIN SERPL BCP-MCNC: 3.7 G/DL (ref 3.4–5)
ALP SERPL-CCNC: 81 U/L (ref 33–136)
ALT SERPL W P-5'-P-CCNC: 11 U/L (ref 7–45)
ANION GAP SERPL CALC-SCNC: 11 MMOL/L (ref 10–20)
AST SERPL W P-5'-P-CCNC: 17 U/L (ref 9–39)
BASOPHILS # BLD AUTO: 0.02 X10*3/UL (ref 0–0.1)
BASOPHILS NFR BLD AUTO: 0.3 %
BILIRUB SERPL-MCNC: 0.6 MG/DL (ref 0–1.2)
BUN SERPL-MCNC: 8 MG/DL (ref 6–23)
CALCIUM SERPL-MCNC: 8.7 MG/DL (ref 8.6–10.3)
CHLORIDE SERPL-SCNC: 103 MMOL/L (ref 98–107)
CO2 SERPL-SCNC: 27 MMOL/L (ref 21–32)
CREAT SERPL-MCNC: 0.78 MG/DL (ref 0.5–1.05)
EGFRCR SERPLBLD CKD-EPI 2021: 83 ML/MIN/1.73M*2
EOSINOPHIL # BLD AUTO: 0.09 X10*3/UL (ref 0–0.7)
EOSINOPHIL NFR BLD AUTO: 1.6 %
ERYTHROCYTE [DISTWIDTH] IN BLOOD BY AUTOMATED COUNT: 14.3 % (ref 11.5–14.5)
GLUCOSE SERPL-MCNC: 90 MG/DL (ref 74–99)
HCT VFR BLD AUTO: 34.6 % (ref 36–46)
HGB BLD-MCNC: 11.4 G/DL (ref 12–16)
IMM GRANULOCYTES # BLD AUTO: 0.01 X10*3/UL (ref 0–0.7)
IMM GRANULOCYTES NFR BLD AUTO: 0.2 % (ref 0–0.9)
LYMPHOCYTES # BLD AUTO: 1.01 X10*3/UL (ref 1.2–4.8)
LYMPHOCYTES NFR BLD AUTO: 17.7 %
MCH RBC QN AUTO: 34.5 PG (ref 26–34)
MCHC RBC AUTO-ENTMCNC: 32.9 G/DL (ref 32–36)
MCV RBC AUTO: 105 FL (ref 80–100)
MONOCYTES # BLD AUTO: 0.51 X10*3/UL (ref 0.1–1)
MONOCYTES NFR BLD AUTO: 8.9 %
NEUTROPHILS # BLD AUTO: 4.08 X10*3/UL (ref 1.2–7.7)
NEUTROPHILS NFR BLD AUTO: 71.3 %
NRBC BLD-RTO: 0 /100 WBCS (ref 0–0)
PLATELET # BLD AUTO: 216 X10*3/UL (ref 150–450)
POTASSIUM SERPL-SCNC: 3 MMOL/L (ref 3.5–5.3)
PROT SERPL-MCNC: 6.1 G/DL (ref 6.4–8.2)
RBC # BLD AUTO: 3.3 X10*6/UL (ref 4–5.2)
SODIUM SERPL-SCNC: 138 MMOL/L (ref 136–145)
WBC # BLD AUTO: 5.7 X10*3/UL (ref 4.4–11.3)

## 2024-12-10 PROCEDURE — 36415 COLL VENOUS BLD VENIPUNCTURE: CPT

## 2024-12-11 ENCOUNTER — HOSPITAL ENCOUNTER (OUTPATIENT)
Dept: RADIOLOGY | Facility: HOSPITAL | Age: 69
Discharge: HOME | End: 2024-12-11
Payer: MEDICARE

## 2024-12-11 DIAGNOSIS — C21.0 SQUAMOUS CELL CANCER, ANUS (MULTI): ICD-10-CM

## 2024-12-11 PROCEDURE — 74177 CT ABD & PELVIS W/CONTRAST: CPT | Performed by: RADIOLOGY

## 2024-12-11 PROCEDURE — 71260 CT THORAX DX C+: CPT | Performed by: RADIOLOGY

## 2024-12-11 PROCEDURE — 2550000001 HC RX 255 CONTRASTS: Performed by: INTERNAL MEDICINE

## 2024-12-11 PROCEDURE — 71260 CT THORAX DX C+: CPT

## 2024-12-18 ENCOUNTER — OFFICE VISIT (OUTPATIENT)
Dept: HEMATOLOGY/ONCOLOGY | Facility: HOSPITAL | Age: 69
End: 2024-12-18
Payer: MEDICARE

## 2024-12-18 VITALS
BODY MASS INDEX: 27.23 KG/M2 | HEIGHT: 66 IN | TEMPERATURE: 96.4 F | DIASTOLIC BLOOD PRESSURE: 72 MMHG | HEART RATE: 88 BPM | WEIGHT: 169.42 LBS | OXYGEN SATURATION: 99 % | SYSTOLIC BLOOD PRESSURE: 108 MMHG | RESPIRATION RATE: 16 BRPM

## 2024-12-18 DIAGNOSIS — C21.0 SQUAMOUS CELL CANCER, ANUS (MULTI): ICD-10-CM

## 2024-12-18 DIAGNOSIS — C21.0 SQUAMOUS CELL CANCER, ANUS (MULTI): Primary | ICD-10-CM

## 2024-12-18 PROCEDURE — 99215 OFFICE O/P EST HI 40 MIN: CPT | Performed by: INTERNAL MEDICINE

## 2024-12-18 PROCEDURE — 1126F AMNT PAIN NOTED NONE PRSNT: CPT | Performed by: INTERNAL MEDICINE

## 2024-12-18 PROCEDURE — 3008F BODY MASS INDEX DOCD: CPT | Performed by: INTERNAL MEDICINE

## 2024-12-18 PROCEDURE — 3078F DIAST BP <80 MM HG: CPT | Performed by: INTERNAL MEDICINE

## 2024-12-18 PROCEDURE — 1159F MED LIST DOCD IN RCRD: CPT | Performed by: INTERNAL MEDICINE

## 2024-12-18 PROCEDURE — 3074F SYST BP LT 130 MM HG: CPT | Performed by: INTERNAL MEDICINE

## 2024-12-18 RX ORDER — FAMOTIDINE 40 MG/1
40 TABLET, FILM COATED ORAL 2 TIMES DAILY
Qty: 180 TABLET | Refills: 0 | Status: SHIPPED | OUTPATIENT
Start: 2024-12-18 | End: 2025-03-18

## 2024-12-18 RX ORDER — FAMOTIDINE 20 MG/1
20 TABLET, FILM COATED ORAL DAILY
COMMUNITY
End: 2024-12-18 | Stop reason: ALTCHOICE

## 2024-12-18 RX ORDER — ONDANSETRON HYDROCHLORIDE 8 MG/1
8 TABLET, FILM COATED ORAL EVERY 8 HOURS PRN
Qty: 30 TABLET | Refills: 1 | Status: SHIPPED | OUTPATIENT
Start: 2024-12-18

## 2024-12-18 RX ORDER — PROCHLORPERAZINE MALEATE 10 MG
10 TABLET ORAL EVERY 6 HOURS PRN
Qty: 30 TABLET | Refills: 5 | Status: SHIPPED | OUTPATIENT
Start: 2024-12-18

## 2024-12-18 ASSESSMENT — ENCOUNTER SYMPTOMS
ABDOMINAL PAIN: 0
DIARRHEA: 1
ABDOMINAL DISTENTION: 0
MUSCULOSKELETAL NEGATIVE: 1
BLOOD IN STOOL: 0
RECTAL PAIN: 1
CARDIOVASCULAR NEGATIVE: 1
CONSTITUTIONAL NEGATIVE: 1

## 2024-12-18 ASSESSMENT — PAIN SCALES - GENERAL: PAINLEVEL_OUTOF10: 0-NO PAIN

## 2024-12-18 NOTE — PROGRESS NOTES
"Patient ID: Mary Palacios is a 69 y.o. female.    Subjective:  Returns for follow up for anal cancer. Has not felt well since completing therapy until a few days ago. Has had stomach aches and loss of appetite. Denies seeing blood in stool.     Heme/Onc History:  Stage/Status:  - p/w anal mass without pain in June 2024. Colonoscopy in Jul 2024: Neg except perianal mass. Bx showed p16 and p40 positive SCC  - PET/CT (Aug 2024): Uptake in anus. No LAPs. MR rectum (Aug 2024): 2.6 cm restricting lesion in inferior anal canal without LAPs  - Deemed not to be amenable to complete resection => Nigor protocol (chemoRT with capecitabine/mitomycin) (Aug - Sep 2024)  - CT a/p (Dec 2024): No sign of recurrence. Anoscopy to be done.    Assessment/Plan:  ? Anal cancer: T2N0M0. Not resectable. Completed Irina protocol with capecitabine. I reviewed CT report and images => No sign of recurrence.   Needs anoscopy. Will see surgery this week.   Will repeat scans in 4 months.   Continue famotidine for dyspepsia.    Review Of Systems:  Review of Systems   Constitutional: Negative.    HENT:  Negative.     Cardiovascular: Negative.    Gastrointestinal:  Positive for diarrhea and rectal pain. Negative for abdominal distention, abdominal pain and blood in stool.   Genitourinary: Negative.     Musculoskeletal: Negative.        Physical Exam:  /72 (BP Location: Left arm, Patient Position: Sitting, BP Cuff Size: Adult)   Pulse 88   Temp 35.8 °C (96.4 °F) (Temporal)   Resp 16   Ht 1.676 m (5' 6\")   Wt 76.9 kg (169 lb 6.8 oz)   SpO2 99%   BMI 27.35 kg/m²   BSA: 1.89 meters squared  Performance Status: Asymptomatic  Physical Exam  Constitutional:       Appearance: Normal appearance.   HENT:      Head: Normocephalic and atraumatic.   Eyes:      Pupils: Pupils are equal, round, and reactive to light.   Cardiovascular:      Rate and Rhythm: Normal rate and regular rhythm.   Pulmonary:      Effort: Pulmonary effort is normal. "   Abdominal:      General: Abdomen is flat.      Palpations: Abdomen is soft. There is no mass.   Musculoskeletal:      Right lower leg: No edema.      Left lower leg: No edema.   Lymphadenopathy:      Cervical: No cervical adenopathy.   Skin:     Coloration: Skin is not jaundiced.   Neurological:      General: No focal deficit present.      Mental Status: She is alert and oriented to person, place, and time.         Results:  Diagnostic Results   Lab Results   Component Value Date    WBC 5.7 12/10/2024    HGB 11.4 (L) 12/10/2024    HCT 34.6 (L) 12/10/2024     (H) 12/10/2024     12/10/2024     Lab Results   Component Value Date    CALCIUM 8.7 12/10/2024     12/10/2024    K 3.0 (L) 12/10/2024    CO2 27 12/10/2024     12/10/2024    BUN 8 12/10/2024    CREATININE 0.78 12/10/2024    ALT 11 12/10/2024    AST 17 12/10/2024       Current Outpatient Medications:     acetaminophen (Tylenol) 325 mg capsule, Take by mouth., Disp: , Rfl:     amLODIPine (Norvasc) 10 mg tablet, Take 1 tablet (10 mg) by mouth once daily., Disp: , Rfl:     atorvastatin (Lipitor) 20 mg tablet, Take 1 tablet (20 mg) by mouth once daily., Disp: , Rfl:     azelastine (Optivar) 0.05 % ophthalmic solution, INSTILL 1 DROP INTO AFFECTED EYE(S) TWICE A DAY AS NEEDED, Disp: , Rfl:     busPIRone (Buspar) 10 mg tablet, Take 1 tablet (10 mg) by mouth once daily., Disp: , Rfl:     ergocalciferol (Vitamin D-2) 1.25 MG (71687 UT) capsule, Take 1 capsule (50,000 Units) by mouth once a week., Disp: , Rfl:     escitalopram (Lexapro) 20 mg tablet, Take 1 tablet (20 mg) by mouth once daily., Disp: , Rfl:     fexofenadine (Allegra) 180 mg tablet, Take 1 tablet (180 mg) by mouth once daily., Disp: , Rfl:     loperamide (Imodium) 2 mg capsule, Take 2 capsules (4 mg) by mouth with the first episode of diarrhea and 1 capsule (2 mg) by mouth with any additional episodes. Maximum 8 capsules (16 mg) per day. (Patient taking differently: 3 times a day  as needed. Take 2 capsules (4 mg) by mouth with the first episode of diarrhea and 1 capsule (2 mg) by mouth with any additional episodes. Maximum 8 capsules (16 mg) per day.), Disp: 100 capsule, Rfl: 2    losartan (Cozaar) 100 mg tablet, Take 1 tablet (100 mg) by mouth once daily., Disp: , Rfl:     magnesium oxide (Mag-Ox) 400 mg (241.3 mg magnesium) tablet, Take 1 tablet (400 mg) by mouth early in the morning.., Disp: , Rfl:     meclizine (Antivert) 12.5 mg tablet, Take 1 tablet (12.5 mg) by mouth every 6 hours if needed., Disp: , Rfl:     famotidine (Pepcid) 40 mg tablet, Take 1 tablet (40 mg) by mouth 2 times a day., Disp: 180 tablet, Rfl: 0    hydrocortisone (Anusol-HC) 2.5 % rectal cream, Insert into the rectum twice a day. (Patient not taking: Reported on 12/18/2024), Disp: , Rfl:     ondansetron (Zofran) 8 mg tablet, Take 1 tablet (8 mg) by mouth every 8 hours if needed for nausea or vomiting., Disp: 30 tablet, Rfl: 1    prochlorperazine (Compazine) 10 mg tablet, Take 1 tablet (10 mg) by mouth every 6 hours if needed for nausea or vomiting., Disp: 30 tablet, Rfl: 5     Past Surgical History:   Procedure Laterality Date    HERNIA REPAIR       Family History   Problem Relation Name Age of Onset    Colon cancer Mother Regla Fernández     Melanoma Mother Reglashara Lermaa     Cancer Mother Regla Lermaa     Prostate cancer Father Lester Fernández     Cancer Father Lester Fernández     Breast cancer Mother's Sister      Lung cancer Mother's Sister      Lung cancer Mother's Brother      Cancer Mother's Sister Sylvie Mckeonprasannaan     Cancer Mother's Sister Piper Rost     Cancer Mother's Brother Anatoly Cool       reports that she has been smoking cigarettes. She has a 7.5 pack-year smoking history. She has been exposed to tobacco smoke. She has never used smokeless tobacco.  Social History     Socioeconomic History    Marital status:      Spouse name: Not on file    Number of children: Not on file    Years of education: Not  on file    Highest education level: Not on file   Occupational History    Not on file   Tobacco Use    Smoking status: Every Day     Current packs/day: 0.50     Average packs/day: 0.5 packs/day for 15.0 years (7.5 ttl pk-yrs)     Types: Cigarettes     Passive exposure: Past    Smokeless tobacco: Never   Vaping Use    Vaping status: Never Used   Substance and Sexual Activity    Alcohol use: Never    Drug use: Yes     Types: Marijuana    Sexual activity: Not Currently   Other Topics Concern    Not on file   Social History Narrative    Not on file     Social Drivers of Health     Financial Resource Strain: Low Risk  (7/24/2024)    Overall Financial Resource Strain (CARDIA)     Difficulty of Paying Living Expenses: Not hard at all   Food Insecurity: No Food Insecurity (7/24/2024)    Hunger Vital Sign     Worried About Running Out of Food in the Last Year: Never true     Ran Out of Food in the Last Year: Never true   Transportation Needs: No Transportation Needs (7/24/2024)    PRAPARE - Transportation     Lack of Transportation (Medical): No     Lack of Transportation (Non-Medical): No   Physical Activity: Inactive (7/24/2024)    Exercise Vital Sign     Days of Exercise per Week: 0 days     Minutes of Exercise per Session: 0 min   Stress: No Stress Concern Present (9/18/2024)    [employer] [place] [employer] - Occupational Stress Questionnaire     Feeling of Stress : Not at all   Social Connections: Moderately Integrated (7/24/2024)    Social Connection and Isolation Panel [NHANES]     Frequency of Communication with Friends and Family: More than three times a week     Frequency of Social Gatherings with Friends and Family: Three times a week     Attends Rastafarian Services: More than 4 times per year     Active Member of Clubs or Organizations: Yes     Attends Club or Organization Meetings: 1 to 4 times per year     Marital Status:    Intimate Partner Violence: Not At Risk (7/24/2024)    Humiliation,  Afraid, Rape, and Kick questionnaire     Fear of Current or Ex-Partner: No     Emotionally Abused: No     Physically Abused: No     Sexually Abused: No   Housing Stability: Low Risk  (7/24/2024)    Housing Stability Vital Sign     Unable to Pay for Housing in the Last Year: No     Number of Times Moved in the Last Year: 1     Homeless in the Last Year: No       Diagnoses and all orders for this visit:  Squamous cell cancer, anus (Multi)  -     Clinic Appointment Request  -     Clinic Appointment Request; Future  -     CBC and Auto Differential; Future  -     Comprehensive Metabolic Panel; Future  -     Magnesium; Future  -     famotidine (Pepcid) 40 mg tablet; Take 1 tablet (40 mg) by mouth 2 times a day.  -     ondansetron (Zofran) 8 mg tablet; Take 1 tablet (8 mg) by mouth every 8 hours if needed for nausea or vomiting.  -     CT chest abdomen pelvis w IV contrast; Future       Cristian Álvarez MD

## 2024-12-20 ENCOUNTER — OFFICE VISIT (OUTPATIENT)
Dept: SURGERY | Facility: CLINIC | Age: 69
End: 2024-12-20
Payer: MEDICARE

## 2024-12-20 VITALS
HEART RATE: 87 BPM | BODY MASS INDEX: 27.61 KG/M2 | HEIGHT: 66 IN | WEIGHT: 171.8 LBS | OXYGEN SATURATION: 98 % | DIASTOLIC BLOOD PRESSURE: 60 MMHG | SYSTOLIC BLOOD PRESSURE: 108 MMHG | TEMPERATURE: 97.9 F

## 2024-12-20 DIAGNOSIS — C21.0 SQUAMOUS CELL CANCER, ANUS (MULTI): Primary | ICD-10-CM

## 2024-12-20 PROCEDURE — 99213 OFFICE O/P EST LOW 20 MIN: CPT | Mod: 25 | Performed by: STUDENT IN AN ORGANIZED HEALTH CARE EDUCATION/TRAINING PROGRAM

## 2024-12-20 PROCEDURE — 1159F MED LIST DOCD IN RCRD: CPT | Performed by: STUDENT IN AN ORGANIZED HEALTH CARE EDUCATION/TRAINING PROGRAM

## 2024-12-20 PROCEDURE — 3078F DIAST BP <80 MM HG: CPT | Performed by: STUDENT IN AN ORGANIZED HEALTH CARE EDUCATION/TRAINING PROGRAM

## 2024-12-20 PROCEDURE — 3008F BODY MASS INDEX DOCD: CPT | Performed by: STUDENT IN AN ORGANIZED HEALTH CARE EDUCATION/TRAINING PROGRAM

## 2024-12-20 PROCEDURE — 3074F SYST BP LT 130 MM HG: CPT | Performed by: STUDENT IN AN ORGANIZED HEALTH CARE EDUCATION/TRAINING PROGRAM

## 2024-12-20 PROCEDURE — 46600 DIAGNOSTIC ANOSCOPY SPX: CPT | Performed by: STUDENT IN AN ORGANIZED HEALTH CARE EDUCATION/TRAINING PROGRAM

## 2024-12-20 PROCEDURE — 1126F AMNT PAIN NOTED NONE PRSNT: CPT | Performed by: STUDENT IN AN ORGANIZED HEALTH CARE EDUCATION/TRAINING PROGRAM

## 2024-12-20 PROCEDURE — 99213 OFFICE O/P EST LOW 20 MIN: CPT | Performed by: STUDENT IN AN ORGANIZED HEALTH CARE EDUCATION/TRAINING PROGRAM

## 2024-12-20 ASSESSMENT — PATIENT HEALTH QUESTIONNAIRE - PHQ9
1. LITTLE INTEREST OR PLEASURE IN DOING THINGS: NOT AT ALL
2. FEELING DOWN, DEPRESSED OR HOPELESS: NOT AT ALL
SUM OF ALL RESPONSES TO PHQ9 QUESTIONS 1 AND 2: 0

## 2024-12-20 ASSESSMENT — PAIN SCALES - GENERAL: PAINLEVEL_OUTOF10: 0-NO PAIN

## 2025-01-13 ASSESSMENT — ENCOUNTER SYMPTOMS
CHEST TIGHTNESS: 0
TROUBLE SWALLOWING: 0
FACIAL ASYMMETRY: 0
VOICE CHANGE: 0
CHILLS: 0
FATIGUE: 1
ABDOMINAL PAIN: 0
UNEXPECTED WEIGHT CHANGE: 0
ADENOPATHY: 0
ARTHRALGIAS: 0
BLOOD IN STOOL: 0
DIARRHEA: 0
HEADACHES: 0
SORE THROAT: 0
FEVER: 0
NAUSEA: 0
SHORTNESS OF BREATH: 0
PALPITATIONS: 0
HEMATURIA: 0
VOMITING: 0
SPEECH DIFFICULTY: 0
WOUND: 0
BRUISES/BLEEDS EASILY: 0
DYSURIA: 0

## 2025-01-13 NOTE — PROGRESS NOTES
History Of Present Illness  Mary Palacios is a 69 y.o. female presenting for follow-up after chemoradiation for squamous cell carcinoma of the anus.  Completed treatment in September so she is now about 3 months out.  Has had some fatigue but this is improving.  The pain around her anus has also improved.  No blood per rectum.     Past Medical History  She has a past medical history of Acute pain of right shoulder (09/15/2016), Anal cancer (Multi), Chest pain (07/24/2016), Depression (01/23/2012), GERD (gastroesophageal reflux disease) (05/15/2015), Hypertension, Nausea (02/18/2022), and Vitamin D deficiency (12/14/2015).    Surgical History  She has a past surgical history that includes Hernia repair (1978).     Social History  She reports that she has been smoking cigarettes. She has a 7.5 pack-year smoking history. She has been exposed to tobacco smoke. She has never used smokeless tobacco. She reports current drug use. Drug: Marijuana. She reports that she does not drink alcohol.    Family History  Family History   Problem Relation Name Age of Onset    Colon cancer Mother Regla Lermaa     Melanoma Mother Regla Kotila     Cancer Mother Reglashara Lermaa     Prostate cancer Father Lester Fernández     Cancer Father Lester Fernández     Breast cancer Mother's Sister      Lung cancer Mother's Sister      Lung cancer Mother's Brother      Cancer Mother's Sister Sylvie Mkceonprasannaan     Cancer Mother's Sister Piper Louie     Cancer Mother's Brother Anatoly Cool         Allergies  Animal dander, House dust mite, Sulfa (sulfonamide antibiotics), Cat dander, and Codeine    Review of Systems   Constitutional:  Positive for fatigue. Negative for chills, fever and unexpected weight change.   HENT:  Negative for sneezing, sore throat, trouble swallowing and voice change.    Respiratory:  Negative for chest tightness and shortness of breath.    Cardiovascular:  Negative for chest pain and palpitations.   Gastrointestinal:  Negative for  "abdominal pain, blood in stool, diarrhea, nausea and vomiting.   Endocrine: Negative for cold intolerance and heat intolerance.   Genitourinary:  Negative for decreased urine volume, dysuria and hematuria.   Musculoskeletal:  Negative for arthralgias and gait problem.   Skin:  Negative for rash and wound.   Neurological:  Negative for facial asymmetry, speech difficulty and headaches.   Hematological:  Negative for adenopathy. Does not bruise/bleed easily.   Psychiatric/Behavioral:  Negative for self-injury and suicidal ideas.         Physical Exam  Vitals and nursing note reviewed.   Constitutional:       Appearance: Normal appearance.   HENT:      Head: Normocephalic and atraumatic.      Mouth/Throat:      Mouth: Mucous membranes are moist.      Pharynx: Oropharynx is clear.   Eyes:      Extraocular Movements: Extraocular movements intact.      Pupils: Pupils are equal, round, and reactive to light.   Cardiovascular:      Rate and Rhythm: Normal rate and regular rhythm.      Pulses: Normal pulses.   Pulmonary:      Effort: Pulmonary effort is normal.      Breath sounds: Normal breath sounds.   Abdominal:      General: There is no distension.      Palpations: Abdomen is soft.      Tenderness: There is no abdominal tenderness.   Genitourinary:      Musculoskeletal:      Cervical back: Normal range of motion and neck supple.   Skin:     General: Skin is warm and dry.   Neurological:      General: No focal deficit present.      Mental Status: She is alert and oriented to person, place, and time.   Psychiatric:         Mood and Affect: Mood normal.         Behavior: Behavior normal.          Last Recorded Vitals  Blood pressure 108/60, pulse 87, temperature 36.6 °C (97.9 °F), temperature source Oral, height 1.676 m (5' 6\"), weight 77.9 kg (171 lb 12.8 oz), SpO2 98%.    Relevant Results  CT scan of chest abdomen pelvis on 12/11/2024 showed no evidence of metastatic disease     Assessment/Plan   Problem List Items " "Addressed This Visit             ICD-10-CM    Squamous cell cancer, anus (Multi) - Primary C21.0     Now status post alysha protocol with no evidence of persistent disease on exam.  She will go into the surveillance protocol.  Will follow-up with me in 3 months for anoscopy and ROBERT.  She has an MRI scheduled around that time as well.  Follow-up with me sooner with any new concerns      uLz Zhao MD    Patient ID: Raquel Palacios \"Lupe" is a 69 y.o. female.    Anoscopy    Date/Time: 12/20/2024 1:45 PM    Performed by: Luz Zhao MD  Authorized by: Luz Zhao MD    Consent:     Consent obtained:  Verbal and written    Consent given by:  Patient    Risks, benefits, and alternatives were discussed: yes      Risks discussed:  Bleeding and pain    Alternatives discussed:  No treatment and alternative treatment  Universal protocol:     Procedure explained and questions answered to patient or proxy's satisfaction: yes      Immediately prior to procedure, a time out was called: yes      Patient identity confirmed:  Verbally with patient  Indications:     Indications comment:  History of anal cancer  Post-procedure details:     Procedure completion:  Tolerated well, no immediate complications  Comments:      Scarring in the left posterior anal canal extending externally, telangiectasia seen within the anal canal.  No ulceration    "

## 2025-01-27 ENCOUNTER — PATIENT MESSAGE (OUTPATIENT)
Dept: SURGERY | Facility: CLINIC | Age: 70
End: 2025-01-27
Payer: MEDICARE

## 2025-01-28 ENCOUNTER — TELEPHONE (OUTPATIENT)
Dept: HEMATOLOGY/ONCOLOGY | Facility: HOSPITAL | Age: 70
End: 2025-01-28
Payer: MEDICARE

## 2025-01-28 NOTE — TELEPHONE ENCOUNTER
She may need an upper endoscopy. I dont think this is related to her treatment now. It has been 4 months. Per Dr. Hall staff message.      I will order upper endoscopy for her. Can you have it scheduled? Per Dr. Hall staff message.         Contacted patient, she is fine with having Dr. Bell's office performing her EGD. Will forward a message to his office for scheduling.

## 2025-01-29 NOTE — PATIENT COMMUNICATION
BRANDYN Camejo MD21 hours ago (10:54 AM)       Dr. Bell's office has contacted patient, will be scheduling her. Prefers him, he is closer to home.

## 2025-01-29 NOTE — PATIENT COMMUNICATION
MD Alisia Devi RN; Alyson Madrid MA; Luz Zhao MD23 hours ago (8:50 AM)     UB  She may need an upper endoscopy. I dont think this is related to her treatment now. It has been 4 months.    Alisia/Karen:  I will order upper endoscopy for her. Can you have it scheduled?  Isabel

## 2025-01-30 ENCOUNTER — ANESTHESIA EVENT (OUTPATIENT)
Dept: GASTROENTEROLOGY | Facility: HOSPITAL | Age: 70
End: 2025-01-30
Payer: MEDICARE

## 2025-01-30 ENCOUNTER — PRE-ADMISSION TESTING (OUTPATIENT)
Dept: PREADMISSION TESTING | Facility: HOSPITAL | Age: 70
End: 2025-01-30
Payer: MEDICARE

## 2025-01-30 ASSESSMENT — DUKE ACTIVITY SCORE INDEX (DASI)
CAN YOU PARTICIPATE IN STRENOUS SPORTS LIKE SWIMMING, SINGLES TENNIS, FOOTBALL, BASKETBALL, OR SKIING: NO
CAN YOU RUN A SHORT DISTANCE: YES
CAN YOU WALK A BLOCK OR TWO ON LEVEL GROUND: YES
CAN YOU CLIMB A FLIGHT OF STAIRS OR WALK UP A HILL: YES
CAN YOU HAVE SEXUAL RELATIONS: NO
CAN YOU PARTICIPATE IN MODERATE RECREATIONAL ACTIVITIES LIKE GOLF, BOWLING, DANCING, DOUBLES TENNIS OR THROWING A BASEBALL OR FOOTBALL: YES
CAN YOU TAKE CARE OF YOURSELF (EAT, DRESS, BATHE, OR USE TOILET): YES
CAN YOU DO YARD WORK LIKE RAKING LEAVES, WEEDING OR PUSHING A MOWER: YES
TOTAL_SCORE: 45.45
CAN YOU DO MODERATE WORK AROUND THE HOUSE LIKE VACUUMING, SWEEPING FLOORS OR CARRYING GROCERIES: YES
CAN YOU DO LIGHT WORK AROUND THE HOUSE LIKE DUSTING OR WASHING DISHES: YES
DASI METS SCORE: 8.3
CAN YOU DO HEAVY WORK AROUND THE HOUSE LIKE SCRUBBING FLOORS OR LIFTING AND MOVING HEAVY FURNITURE: YES
CAN YOU WALK INDOORS, SUCH AS AROUND YOUR HOUSE: YES

## 2025-01-30 NOTE — ANESTHESIA PREPROCEDURE EVALUATION
"Patient: Raquel Palacios \"Mary\"    Procedure Information       Date/Time: 02/04/25 0800    Scheduled providers: Jason Bell MD    Procedure: EGD    Location: [facility]            Relevant Problems   Anesthesia (within normal limits)      Cardiac   (+) HTN (hypertension)      Pulmonary   (+) Extrinsic asthma with acute exacerbation (HHS-HCC)      Neuro   (+) Anxiety state      GI   (+) GERD (gastroesophageal reflux disease)   (+) Squamous cell cancer, anus (Multi)      /Renal (within normal limits)      Liver   (+) Squamous cell cancer, anus (Multi)      Endocrine (within normal limits)   (+) Prediabetes      Hematology (within normal limits)      Musculoskeletal (within normal limits)      HEENT (within normal limits)      ID (within normal limits)      Skin (within normal limits)      GYN (within normal limits)      Genitourinary   (+) Stage 3b chronic kidney disease (Multi)      ENT   (+) Allergic rhinitis      Tobacco   (+) Tobacco use disorder      Cardiac and Vasculature   (+) Dyslipidemia     There were no vitals filed for this visit.    Past Surgical History:   Procedure Laterality Date   • HERNIA REPAIR  1978     Past Medical History:   Diagnosis Date   • Acute pain of right shoulder 09/15/2016   • Anal cancer (Multi)    • Chest pain 07/24/2016   • Depression 01/23/2012   • GERD (gastroesophageal reflux disease) 05/15/2015   • Hypertension    • Nausea 02/18/2022   • Vitamin D deficiency 12/14/2015       Current Outpatient Medications:   •  acetaminophen (Tylenol) 325 mg capsule, Take by mouth., Disp: , Rfl:   •  amLODIPine (Norvasc) 10 mg tablet, Take 1 tablet (10 mg) by mouth once daily., Disp: , Rfl:   •  atorvastatin (Lipitor) 20 mg tablet, Take 1 tablet (20 mg) by mouth once daily., Disp: , Rfl:   •  azelastine (Optivar) 0.05 % ophthalmic solution, INSTILL 1 DROP INTO AFFECTED EYE(S) TWICE A DAY AS NEEDED, Disp: , Rfl:   •  busPIRone (Buspar) 10 mg tablet, Take 1 tablet (10 mg) by " mouth once daily., Disp: , Rfl:   •  ergocalciferol (Vitamin D-2) 1.25 MG (30828 UT) capsule, Take 1 capsule (50,000 Units) by mouth once a week., Disp: , Rfl:   •  escitalopram (Lexapro) 20 mg tablet, Take 1 tablet (20 mg) by mouth once daily., Disp: , Rfl:   •  famotidine (Pepcid) 40 mg tablet, Take 1 tablet (40 mg) by mouth 2 times a day., Disp: 180 tablet, Rfl: 0  •  fexofenadine (Allegra) 180 mg tablet, Take 1 tablet (180 mg) by mouth once daily., Disp: , Rfl:   •  hydrocortisone (Anusol-HC) 2.5 % rectal cream, Insert into the rectum twice a day. (Patient not taking: Reported on 12/18/2024), Disp: , Rfl:   •  loperamide (Imodium) 2 mg capsule, Take 2 capsules (4 mg) by mouth with the first episode of diarrhea and 1 capsule (2 mg) by mouth with any additional episodes. Maximum 8 capsules (16 mg) per day. (Patient taking differently: 3 times a day as needed. Take 2 capsules (4 mg) by mouth with the first episode of diarrhea and 1 capsule (2 mg) by mouth with any additional episodes. Maximum 8 capsules (16 mg) per day.), Disp: 100 capsule, Rfl: 2  •  losartan (Cozaar) 100 mg tablet, Take 1 tablet (100 mg) by mouth once daily., Disp: , Rfl:   •  magnesium oxide (Mag-Ox) 400 mg (241.3 mg magnesium) tablet, Take 1 tablet (400 mg) by mouth early in the morning.., Disp: , Rfl:   •  meclizine (Antivert) 12.5 mg tablet, Take 1 tablet (12.5 mg) by mouth every 6 hours if needed., Disp: , Rfl:   •  ondansetron (Zofran) 8 mg tablet, Take 1 tablet (8 mg) by mouth every 8 hours if needed for nausea or vomiting., Disp: 30 tablet, Rfl: 1  •  prochlorperazine (Compazine) 10 mg tablet, Take 1 tablet (10 mg) by mouth every 6 hours if needed for nausea or vomiting., Disp: 30 tablet, Rfl: 5  Prior to Admission medications    Medication Sig Start Date End Date Taking? Authorizing Provider   acetaminophen (Tylenol) 325 mg capsule Take by mouth.    Historical Provider, MD   amLODIPine (Norvasc) 10 mg tablet Take 1 tablet (10 mg) by  mouth once daily. 5/21/24 12/18/24  Historical Provider, MD   atorvastatin (Lipitor) 20 mg tablet Take 1 tablet (20 mg) by mouth once daily. 5/21/24   Historical Provider, MD   azelastine (Optivar) 0.05 % ophthalmic solution INSTILL 1 DROP INTO AFFECTED EYE(S) TWICE A DAY AS NEEDED 9/2/23   Historical Provider, MD   busPIRone (Buspar) 10 mg tablet Take 1 tablet (10 mg) by mouth once daily. 7/8/24   Historical Provider, MD   ergocalciferol (Vitamin D-2) 1.25 MG (15967 UT) capsule Take 1 capsule (50,000 Units) by mouth once a week. 6/6/24   Historical Provider, MD   escitalopram (Lexapro) 20 mg tablet Take 1 tablet (20 mg) by mouth once daily.    Historical Provider, MD   famotidine (Pepcid) 40 mg tablet Take 1 tablet (40 mg) by mouth 2 times a day. 12/18/24 3/18/25  Cristian Álvarez MD   fexofenadine (Allegra) 180 mg tablet Take 1 tablet (180 mg) by mouth once daily.    Historical Provider, MD   hydrocortisone (Anusol-HC) 2.5 % rectal cream Insert into the rectum twice a day.  Patient not taking: Reported on 12/18/2024 7/9/24 10/7/24  Historical Provider, MD   loperamide (Imodium) 2 mg capsule Take 2 capsules (4 mg) by mouth with the first episode of diarrhea and 1 capsule (2 mg) by mouth with any additional episodes. Maximum 8 capsules (16 mg) per day.  Patient taking differently: 3 times a day as needed. Take 2 capsules (4 mg) by mouth with the first episode of diarrhea and 1 capsule (2 mg) by mouth with any additional episodes. Maximum 8 capsules (16 mg) per day. 7/24/24   Cristian Álvarez MD   losartan (Cozaar) 100 mg tablet Take 1 tablet (100 mg) by mouth once daily. 6/18/24   Historical Provider, MD   magnesium oxide (Mag-Ox) 400 mg (241.3 mg magnesium) tablet Take 1 tablet (400 mg) by mouth early in the morning.. 6/11/24   Historical Provider, MD   meclizine (Antivert) 12.5 mg tablet Take 1 tablet (12.5 mg) by mouth every 6 hours if needed. 9/18/23   Historical Provider, MD   ondansetron (Zofran) 8 mg tablet  "Take 1 tablet (8 mg) by mouth every 8 hours if needed for nausea or vomiting. 12/18/24   Cristian Álvarez MD   prochlorperazine (Compazine) 10 mg tablet Take 1 tablet (10 mg) by mouth every 6 hours if needed for nausea or vomiting. 12/18/24   Cristian Álvarez MD     Allergies   Allergen Reactions   • Animal Dander Itching and Shortness of breath   • House Dust Mite Itching   • Sulfa (Sulfonamide Antibiotics) Anaphylaxis   • Cat Dander Itching   • Codeine Other     Cant wake up     Social History     Tobacco Use   • Smoking status: Every Day     Current packs/day: 0.50     Average packs/day: 0.5 packs/day for 15.0 years (7.5 ttl pk-yrs)     Types: Cigarettes     Passive exposure: Past   • Smokeless tobacco: Never   Substance Use Topics   • Alcohol use: Never         Chemistry    Lab Results   Component Value Date/Time     12/10/2024 1456    K 3.0 (L) 12/10/2024 1456     12/10/2024 1456    CO2 27 12/10/2024 1456    BUN 8 12/10/2024 1456    CREATININE 0.78 12/10/2024 1456    Lab Results   Component Value Date/Time    CALCIUM 8.7 12/10/2024 1456    ALKPHOS 81 12/10/2024 1456    AST 17 12/10/2024 1456    ALT 11 12/10/2024 1456    BILITOT 0.6 12/10/2024 1456          Lab Results   Component Value Date/Time    WBC 5.7 12/10/2024 1456    HGB 11.4 (L) 12/10/2024 1456    HCT 34.6 (L) 12/10/2024 1456     12/10/2024 1456     No results found for: \"PROTIME\", \"PTT\", \"INR\"  No results found for this or any previous visit (from the past 4464 hours).  No results found for this or any previous visit from the past 1095 days.    Clinical information reviewed:                 Chart reviewed.  No clearances ordered.      NPO Detail:  No data recorded     Physical Exam    Airway  Mallampati: II  TM distance: >3 FB  Neck ROM: full     Cardiovascular - normal exam     Dental - normal exam     Pulmonary - normal exam     Abdominal - normal exam         Anesthesia Plan    History of general anesthesia?: yes  History of " complications of general anesthesia?: no    ASA 3     MAC     The patient is not a current smoker.  Patient was not previously instructed to abstain from smoking on day of procedure.  Patient did not smoke on day of procedure.    intravenous induction   Postoperative administration of opioids is intended.  Anesthetic plan and risks discussed with patient.  Use of blood products discussed with patient who consented to blood products.    Plan discussed with CRNA.

## 2025-01-30 NOTE — PREPROCEDURE INSTRUCTIONS
Medication List            Accurate as of January 30, 2025  9:54 AM. Always use your most recent med list.                acetaminophen 325 mg capsule  Commonly known as: Tylenol  Medication Adjustments for Surgery: Take/Use as prescribed     amLODIPine 10 mg tablet  Commonly known as: Norvasc  Medication Adjustments for Surgery: Take/Use as prescribed     atorvastatin 20 mg tablet  Commonly known as: Lipitor  Medication Adjustments for Surgery: Take/Use as prescribed     azelastine 0.05 % ophthalmic solution  Commonly known as: Optivar  Medication Adjustments for Surgery: Take/Use as prescribed     busPIRone 10 mg tablet  Commonly known as: Buspar  Medication Adjustments for Surgery: Take/Use as prescribed     ergocalciferol 1.25 MG (98180 UT) capsule  Commonly known as: Vitamin D-2  Additional Medication Adjustments for Surgery: Take last dose 7 days before surgery     escitalopram 20 mg tablet  Commonly known as: Lexapro  Medication Adjustments for Surgery: Take/Use as prescribed     famotidine 40 mg tablet  Commonly known as: Pepcid  Take 1 tablet (40 mg) by mouth 2 times a day.  Medication Adjustments for Surgery: Do Not take on the morning of surgery     fexofenadine 180 mg tablet  Commonly known as: Allegra  Medication Adjustments for Surgery: Take/Use as prescribed     hydrocortisone 2.5 % rectal cream  Commonly known as: Anusol-HC  Medication Adjustments for Surgery: Take/Use as prescribed     loperamide 2 mg capsule  Commonly known as: Imodium  Take 2 capsules (4 mg) by mouth with the first episode of diarrhea and 1 capsule (2 mg) by mouth with any additional episodes. Maximum 8 capsules (16 mg) per day.  Medication Adjustments for Surgery: Take/Use as prescribed     losartan 100 mg tablet  Commonly known as: Cozaar  Medication Adjustments for Surgery: Take/Use as prescribed     magnesium oxide 400 mg (241.3 mg magnesium) tablet  Commonly known as: Mag-Ox  Medication Adjustments for Surgery: Take last  dose 3 days before surgery     meclizine 12.5 mg tablet  Commonly known as: Antivert  Medication Adjustments for Surgery: Take/Use as prescribed     ondansetron 8 mg tablet  Commonly known as: Zofran  Take 1 tablet (8 mg) by mouth every 8 hours if needed for nausea or vomiting.  Medication Adjustments for Surgery: Take/Use as prescribed     prochlorperazine 10 mg tablet  Commonly known as: Compazine  Take 1 tablet (10 mg) by mouth every 6 hours if needed for nausea or vomiting.  Medication Adjustments for Surgery: Take/Use as prescribed            - Call Outpatient Surgery the day before procedure/surgery (Friday for a Monday procedure) between 1-3 pm to get your arrival time:  [phone]    -No food after midnight including chewing gum, candy, and mints.    -You can have clear liquids ( Water, Tea, Pop/Soda, Gatorade/Powerade, Black coffee) up to 3 hours before your procedure. NO JUICES, NO DAIRY, NO CREAMERS, NO HALF/HALF, NO NON-DAIRY CREAMERS/POWDERS.    -Please refrain from smoking THC, cigarettes, and/or vaping prior to your procedure for at least 24 hours.     - On day of procedure, have a  (if having anesthesia or sedation). Park in the back parking lot and come through the ER lobby. Take elevator to second floor outpatient dept. Check in at the outpatient surgery window.    - Wear comfortable clothes, remove all jewelry and piercings.    -Glasses, contacts, and/or dentures may have to be removed. Bring a glass/contact case. A denture cup will be provided.    -Please shower or bathe in the morning using an antibacterial soap. No makeup. Follow instructions if you are issued Hibiclens soap and/or mouth wash.     -No more than 2 visitors at your bedside.

## 2025-02-04 ENCOUNTER — HOSPITAL ENCOUNTER (OUTPATIENT)
Dept: GASTROENTEROLOGY | Facility: HOSPITAL | Age: 70
Discharge: HOME | End: 2025-02-04
Payer: MEDICARE

## 2025-02-04 ENCOUNTER — ANESTHESIA (OUTPATIENT)
Dept: GASTROENTEROLOGY | Facility: HOSPITAL | Age: 70
End: 2025-02-04
Payer: MEDICARE

## 2025-02-04 VITALS
RESPIRATION RATE: 16 BRPM | WEIGHT: 160 LBS | TEMPERATURE: 98.7 F | BODY MASS INDEX: 25.71 KG/M2 | HEART RATE: 74 BPM | HEIGHT: 66 IN | DIASTOLIC BLOOD PRESSURE: 79 MMHG | OXYGEN SATURATION: 98 % | SYSTOLIC BLOOD PRESSURE: 105 MMHG

## 2025-02-04 DIAGNOSIS — C21.0 SQUAMOUS CELL CANCER, ANUS (MULTI): ICD-10-CM

## 2025-02-04 DIAGNOSIS — K21.9 GASTROESOPHAGEAL REFLUX DISEASE WITHOUT ESOPHAGITIS: ICD-10-CM

## 2025-02-04 DIAGNOSIS — R11.0 NAUSEA: Primary | ICD-10-CM

## 2025-02-04 PROCEDURE — 3700000001 HC GENERAL ANESTHESIA TIME - INITIAL BASE CHARGE

## 2025-02-04 PROCEDURE — 7100000010 HC PHASE TWO TIME - EACH INCREMENTAL 1 MINUTE

## 2025-02-04 PROCEDURE — 2500000004 HC RX 250 GENERAL PHARMACY W/ HCPCS (ALT 636 FOR OP/ED): Performed by: NURSE ANESTHETIST, CERTIFIED REGISTERED

## 2025-02-04 PROCEDURE — 43239 EGD BIOPSY SINGLE/MULTIPLE: CPT | Performed by: SURGERY

## 2025-02-04 PROCEDURE — 7100000009 HC PHASE TWO TIME - INITIAL BASE CHARGE

## 2025-02-04 PROCEDURE — 3700000002 HC GENERAL ANESTHESIA TIME - EACH INCREMENTAL 1 MINUTE

## 2025-02-04 RX ORDER — POTASSIUM CHLORIDE 1500 MG/1
20 TABLET, EXTENDED RELEASE ORAL 2 TIMES DAILY
COMMUNITY
Start: 2024-12-16

## 2025-02-04 RX ORDER — PROPOFOL 10 MG/ML
INJECTION, EMULSION INTRAVENOUS AS NEEDED
Status: DISCONTINUED | OUTPATIENT
Start: 2025-02-04 | End: 2025-02-04

## 2025-02-04 RX ORDER — IPRATROPIUM BROMIDE 42 UG/1
1 SPRAY, METERED NASAL
COMMUNITY
Start: 2025-01-04

## 2025-02-04 RX ORDER — LIDOCAINE HYDROCHLORIDE 20 MG/ML
INJECTION, SOLUTION EPIDURAL; INFILTRATION; INTRACAUDAL; PERINEURAL AS NEEDED
Status: DISCONTINUED | OUTPATIENT
Start: 2025-02-04 | End: 2025-02-04

## 2025-02-04 RX ORDER — SUCRALFATE 1 G/1
1 TABLET ORAL
Qty: 60 TABLET | Refills: 1 | Status: SHIPPED | OUTPATIENT
Start: 2025-02-04 | End: 2025-04-05

## 2025-02-04 RX ADMIN — PROPOFOL 100 MG: 10 INJECTION, EMULSION INTRAVENOUS at 08:26

## 2025-02-04 RX ADMIN — LIDOCAINE HYDROCHLORIDE 100 MG: 20 INJECTION, SOLUTION EPIDURAL; INFILTRATION; INTRACAUDAL; PERINEURAL at 08:26

## 2025-02-04 RX ADMIN — PROPOFOL 50 MG: 10 INJECTION, EMULSION INTRAVENOUS at 08:31

## 2025-02-04 RX ADMIN — PROPOFOL 50 MG: 10 INJECTION, EMULSION INTRAVENOUS at 08:28

## 2025-02-04 SDOH — HEALTH STABILITY: MENTAL HEALTH: CURRENT SMOKER: 0

## 2025-02-04 ASSESSMENT — PAIN SCALES - GENERAL
PAINLEVEL_OUTOF10: 0 - NO PAIN

## 2025-02-04 ASSESSMENT — PAIN - FUNCTIONAL ASSESSMENT
PAIN_FUNCTIONAL_ASSESSMENT: 0-10

## 2025-02-04 NOTE — ANESTHESIA POSTPROCEDURE EVALUATION
"Patient: Raquel Palacios \"Mary\"    Procedure Summary       Date: 02/04/25 Room / Location: [facility]    Anesthesia Start: 0821 Anesthesia Stop: 0836    Procedure: EGD Diagnosis: Squamous cell cancer, anus (Multi)    Scheduled Providers: Jason Bell MD Responsible Provider: DIONICIO Boone    Anesthesia Type: MAC ASA Status: 3            Anesthesia Type: MAC    Vitals Value Taken Time   BP 85/58 02/04/25 0842   Temp 36.2 °C (97.1 °F) 02/04/25 0834   Pulse 73 02/04/25 0842   Resp 16 02/04/25 0842   SpO2 93 % 02/04/25 0842       Anesthesia Post Evaluation    Patient location during evaluation: bedside  Patient participation: complete - patient participated  Level of consciousness: awake and alert  Pain management: satisfactory to patient  Multimodal analgesia pain management approach  Airway patency: patent  Cardiovascular status: acceptable  Respiratory status: acceptable  Hydration status: acceptable  Postoperative Nausea and Vomiting: none    No notable events documented.    "

## 2025-02-04 NOTE — DISCHARGE INSTRUCTIONS

## 2025-02-04 NOTE — H&P
"History Of Present Illness  Raquel Palacios \"Mary\" is a 69 y.o. female presenting for an EGD for epigastric pain      Past Medical History  Past Medical History:   Diagnosis Date    Acute pain of right shoulder 09/15/2016    Anal cancer (Multi)     Chest pain 07/24/2016    Depression 01/23/2012    GERD (gastroesophageal reflux disease) 05/15/2015    Hypertension     Nausea 02/18/2022    Vitamin D deficiency 12/14/2015       Surgical History  Past Surgical History:   Procedure Laterality Date    HERNIA REPAIR  1978        Social History  She reports that she has been smoking cigarettes. She has a 7.5 pack-year smoking history. She has been exposed to tobacco smoke. She has never used smokeless tobacco. She reports current drug use. Drug: Marijuana. She reports that she does not drink alcohol.    Family History  Family History   Problem Relation Name Age of Onset    Colon cancer Mother Reglashara Lermaa     Melanoma Mother Reglashara Lermaa     Cancer Mother Reglashara Fernández     Prostate cancer Father Lester Fernández     Cancer Father Lester Fernández     Breast cancer Mother's Sister      Lung cancer Mother's Sister      Lung cancer Mother's Brother      Cancer Mother's Sister Sylvie Fregosoan     Cancer Mother's Sister Piper Louie     Cancer Mother's Brother Anatoly Cool         Allergies  Animal dander, House dust mite, Sulfa (sulfonamide antibiotics), Cat dander, and Codeine    Review of Systems   All other systems reviewed and are negative.       Physical Exam  Constitutional:       Appearance: Normal appearance.   Cardiovascular:      Heart sounds: Normal heart sounds.   Pulmonary:      Breath sounds: Normal breath sounds and air entry.   Abdominal:      General: Abdomen is flat.      Palpations: Abdomen is soft.      Tenderness: There is no abdominal tenderness.   Neurological:      Mental Status: She is alert.          Last Recorded Vitals  There were no vitals taken for this visit.       Assessment/Plan   Assessment & " Plan  Squamous cell cancer, anus (Multi)      ESOPHAGOGASTRODODENOSCOPY/ BIOPSIES   Risks include, but not limited to pain, infection, bleeding, perforation,  missed lesions, aspiration, risk of cardiac, pulmonary, neurologic, locomotor, anesthetic events,  and other unforeseen complications including death.      Jason Bell MD

## 2025-02-05 ASSESSMENT — PAIN SCALES - GENERAL: PAINLEVEL_OUTOF10: 0 - NO PAIN

## 2025-02-11 ENCOUNTER — TELEPHONE (OUTPATIENT)
Dept: SURGERY | Facility: CLINIC | Age: 70
End: 2025-02-11
Payer: MEDICARE

## 2025-02-26 DIAGNOSIS — K21.9 GASTROESOPHAGEAL REFLUX DISEASE WITHOUT ESOPHAGITIS: ICD-10-CM

## 2025-02-26 RX ORDER — SUCRALFATE 1 G/1
1 TABLET ORAL
Qty: 60 TABLET | Refills: 1 | Status: SHIPPED | OUTPATIENT
Start: 2025-02-26 | End: 2025-04-27

## 2025-02-28 ENCOUNTER — APPOINTMENT (OUTPATIENT)
Dept: RADIOLOGY | Facility: HOSPITAL | Age: 70
End: 2025-02-28
Payer: MEDICARE

## 2025-03-05 ENCOUNTER — APPOINTMENT (OUTPATIENT)
Dept: SURGERY | Facility: CLINIC | Age: 70
End: 2025-03-05
Payer: MEDICARE

## 2025-03-05 VITALS
BODY MASS INDEX: 27 KG/M2 | HEIGHT: 66 IN | SYSTOLIC BLOOD PRESSURE: 112 MMHG | WEIGHT: 168 LBS | HEART RATE: 97 BPM | TEMPERATURE: 97 F | DIASTOLIC BLOOD PRESSURE: 74 MMHG

## 2025-03-05 DIAGNOSIS — C21.0 SQUAMOUS CELL CANCER, ANUS (MULTI): ICD-10-CM

## 2025-03-05 DIAGNOSIS — R10.13 EPIGASTRIC PAIN: Primary | ICD-10-CM

## 2025-03-05 PROBLEM — E66.811 OBESITY, CLASS I, BMI 30-34.9: Status: RESOLVED | Noted: 2019-02-27 | Resolved: 2025-03-05

## 2025-03-05 PROCEDURE — 1159F MED LIST DOCD IN RCRD: CPT | Performed by: SURGERY

## 2025-03-05 PROCEDURE — 3074F SYST BP LT 130 MM HG: CPT | Performed by: SURGERY

## 2025-03-05 PROCEDURE — 3008F BODY MASS INDEX DOCD: CPT | Performed by: SURGERY

## 2025-03-05 PROCEDURE — 99212 OFFICE O/P EST SF 10 MIN: CPT | Performed by: SURGERY

## 2025-03-05 PROCEDURE — 3078F DIAST BP <80 MM HG: CPT | Performed by: SURGERY

## 2025-03-05 PROCEDURE — 1160F RVW MEDS BY RX/DR IN RCRD: CPT | Performed by: SURGERY

## 2025-03-05 NOTE — PATIENT INSTRUCTIONS
I am glad you are feeling better after your scope and on the Carafate.  Continue this while your symptoms persist.  You can eventually stop this if needed.  You can take this with Pepcid.  You can apply topical Desitin ointment to your perianal area if there is any irritation.  I will see you as needed.

## 2025-03-05 NOTE — PROGRESS NOTES
"  Patient ID: Raquel Palacios \"Lupe" is a 69 y.o. female.  Following up after EGD.    Subjective   HPI  Patient feels much improved with oral Carafate.  Has a better appetite.  Is taking 1 pill a day.  Feels like she has a small bulge in the perianal area.  Review of Systems   All other systems reviewed and are negative.      Objective   Physical Exam  Constitutional:       Appearance: Normal appearance.   Abdominal:      Palpations: Abdomen is soft. There is no mass.      Tenderness: There is no abdominal tenderness. There is no guarding.      Comments: Rectal examination no masses.  Indurated perianal skin.  Mild irritation.           Review of pathology  Surgical Pathology Exam: [id number]  Order: [id number]   Collected 2/4/2025 08:28       Status: Final result       Visible to patient: Yes (seen)       Dx: Squamous cell cancer, anus (Multi)    1 Result Note       2 Follow-up Encounters       Component  Resulting Agency   FINAL DIAGNOSIS   A. DUODENUM SECOND PART BIOPSY:   -- Duodenal mucosa with no significant pathologic change.  -- No morphologic evidence of Celiac disease.       B. STOMACH ANTRUM BIOPSY:   -- Gastric mucosa with mild reactive epithelial change.    -- No Helicobacter pylori organisms identified on routine stained slides.          Problem List Items Addressed This Visit       Squamous cell cancer, anus (Multi)    Epigastric pain - Primary        Assessment/Plan   Plan-continue to 1 pill of Carafate a day if that helps you.  You can eventually discontinue this if your symptoms improved significantly.  I will follow-up with you as needed.   "

## 2025-03-11 ENCOUNTER — APPOINTMENT (OUTPATIENT)
Dept: SURGERY | Facility: CLINIC | Age: 70
End: 2025-03-11
Payer: MEDICARE

## 2025-03-18 ENCOUNTER — OFFICE VISIT (OUTPATIENT)
Dept: SURGERY | Facility: CLINIC | Age: 70
End: 2025-03-18
Payer: MEDICARE

## 2025-03-18 VITALS
SYSTOLIC BLOOD PRESSURE: 110 MMHG | WEIGHT: 163.4 LBS | HEART RATE: 74 BPM | OXYGEN SATURATION: 100 % | DIASTOLIC BLOOD PRESSURE: 62 MMHG | HEIGHT: 66 IN | BODY MASS INDEX: 26.26 KG/M2

## 2025-03-18 DIAGNOSIS — C21.0 SQUAMOUS CELL CANCER, ANUS (MULTI): Primary | ICD-10-CM

## 2025-03-18 PROCEDURE — 3074F SYST BP LT 130 MM HG: CPT | Performed by: STUDENT IN AN ORGANIZED HEALTH CARE EDUCATION/TRAINING PROGRAM

## 2025-03-18 PROCEDURE — 99214 OFFICE O/P EST MOD 30 MIN: CPT | Mod: 25 | Performed by: STUDENT IN AN ORGANIZED HEALTH CARE EDUCATION/TRAINING PROGRAM

## 2025-03-18 PROCEDURE — 46600 DIAGNOSTIC ANOSCOPY SPX: CPT | Performed by: STUDENT IN AN ORGANIZED HEALTH CARE EDUCATION/TRAINING PROGRAM

## 2025-03-18 PROCEDURE — 1126F AMNT PAIN NOTED NONE PRSNT: CPT | Performed by: STUDENT IN AN ORGANIZED HEALTH CARE EDUCATION/TRAINING PROGRAM

## 2025-03-18 PROCEDURE — 3008F BODY MASS INDEX DOCD: CPT | Performed by: STUDENT IN AN ORGANIZED HEALTH CARE EDUCATION/TRAINING PROGRAM

## 2025-03-18 PROCEDURE — 99214 OFFICE O/P EST MOD 30 MIN: CPT | Performed by: STUDENT IN AN ORGANIZED HEALTH CARE EDUCATION/TRAINING PROGRAM

## 2025-03-18 PROCEDURE — 1159F MED LIST DOCD IN RCRD: CPT | Performed by: STUDENT IN AN ORGANIZED HEALTH CARE EDUCATION/TRAINING PROGRAM

## 2025-03-18 PROCEDURE — 3078F DIAST BP <80 MM HG: CPT | Performed by: STUDENT IN AN ORGANIZED HEALTH CARE EDUCATION/TRAINING PROGRAM

## 2025-03-18 RX ORDER — LORAZEPAM 1 MG/1
1 TABLET ORAL EVERY 6 HOURS PRN
Qty: 1 TABLET | Refills: 0 | Status: SHIPPED | OUTPATIENT
Start: 2025-03-18

## 2025-03-18 ASSESSMENT — ENCOUNTER SYMPTOMS
HEMATURIA: 0
UNEXPECTED WEIGHT CHANGE: 0
CHILLS: 0
DYSURIA: 0
ARTHRALGIAS: 0
BLOOD IN STOOL: 0
TROUBLE SWALLOWING: 0
PALPITATIONS: 0
VOICE CHANGE: 0
DIARRHEA: 0
VOMITING: 0
SHORTNESS OF BREATH: 0
SPEECH DIFFICULTY: 0
BRUISES/BLEEDS EASILY: 0
FACIAL ASYMMETRY: 0
DIAPHORESIS: 1
HEADACHES: 0
SORE THROAT: 0
NAUSEA: 0
ADENOPATHY: 0
WOUND: 0
ABDOMINAL PAIN: 0
CHEST TIGHTNESS: 0
FEVER: 0

## 2025-03-18 ASSESSMENT — PAIN SCALES - GENERAL: PAINLEVEL_OUTOF10: 0-NO PAIN

## 2025-03-18 NOTE — PROGRESS NOTES
History Of Present Illness  Mary Palacios is a 69 y.o. female presenting for follow-up after treatment of anal squamous cell carcinoma.  Chemoradiation was completed in September 2024.  He was having abdominal pain earlier this year and underwent an EGD which showed gastritis.  Was negative for H. pylori and she was given Carafate which has helped with her symptoms.  She also has occasional hot flashes at night.  Interestingly it does not happen if she takes a nap during the day, but if she does not it will happen at night.  This been going on for several months.  No other concerning symptoms.  She is no longer losing any weight.  No changes in her bowel habits.  No perianal pain or blood with bowel movements.     Past Medical History  She has a past medical history of Acute pain of right shoulder (09/15/2016), Anal cancer (Multi), Chest pain (07/24/2016), Depression (01/23/2012), GERD (gastroesophageal reflux disease) (05/15/2015), Hypertension, Nausea (02/18/2022), Squamous cell carcinoma of anus (Multi), and Vitamin D deficiency (12/14/2015).    Surgical History  She has a past surgical history that includes Hernia repair (1978); Colonoscopy (07/2024); and Esophagogastroduodenoscopy.     Social History  She reports that she has been smoking cigarettes. She has a 7.5 pack-year smoking history. She has been exposed to tobacco smoke. She has never used smokeless tobacco. She reports current drug use. Drug: Marijuana. She reports that she does not drink alcohol.    Family History  Family History   Problem Relation Name Age of Onset    Colon cancer Mother Regla Lermaa     Melanoma Mother Reglashara Lermaa     Cancer Mother Regla Lermaa     Prostate cancer Father Lester Fernández     Cancer Father Lester Fernández     Breast cancer Mother's Sister      Lung cancer Mother's Sister      Lung cancer Mother's Brother      Cancer Mother's Sister Sylvie Daniel     Cancer Mother's Sister Piper Louie     Cancer Mother's Brother  Anatoly Cool         Allergies  Animal dander, House dust mite, Sulfa (sulfonamide antibiotics), Cat dander, and Codeine    Review of Systems   Constitutional:  Positive for diaphoresis. Negative for chills, fever and unexpected weight change.   HENT:  Negative for sneezing, sore throat, trouble swallowing and voice change.    Respiratory:  Negative for chest tightness and shortness of breath.    Cardiovascular:  Negative for chest pain and palpitations.   Gastrointestinal:  Negative for abdominal pain, blood in stool, diarrhea, nausea and vomiting.   Endocrine: Negative for cold intolerance and heat intolerance.   Genitourinary:  Negative for decreased urine volume, dysuria and hematuria.   Musculoskeletal:  Negative for arthralgias and gait problem.   Skin:  Negative for rash and wound.   Neurological:  Negative for facial asymmetry, speech difficulty and headaches.   Hematological:  Negative for adenopathy. Does not bruise/bleed easily.   Psychiatric/Behavioral:  Negative for self-injury and suicidal ideas.         Physical Exam  Vitals and nursing note reviewed.   Constitutional:       Appearance: Normal appearance.   HENT:      Head: Normocephalic and atraumatic.      Mouth/Throat:      Mouth: Mucous membranes are moist.      Pharynx: Oropharynx is clear.   Eyes:      Extraocular Movements: Extraocular movements intact.      Pupils: Pupils are equal, round, and reactive to light.   Cardiovascular:      Rate and Rhythm: Normal rate and regular rhythm.      Pulses: Normal pulses.   Pulmonary:      Effort: Pulmonary effort is normal.      Breath sounds: Normal breath sounds.   Abdominal:      General: There is no distension.      Palpations: Abdomen is soft.      Tenderness: There is no abdominal tenderness.   Musculoskeletal:      Cervical back: Normal range of motion and neck supple.   Lymphadenopathy:      Lower Body: No right inguinal adenopathy. No left inguinal adenopathy.   Skin:     General: Skin is warm  "and dry.   Neurological:      General: No focal deficit present.      Mental Status: She is alert and oriented to person, place, and time.   Psychiatric:         Mood and Affect: Mood normal.         Behavior: Behavior normal.          Last Recorded Vitals  Blood pressure 110/62, pulse 74, height 1.676 m (5' 6\"), weight 74.1 kg (163 lb 6.4 oz), SpO2 100%.    Relevant Results       Assessment/Plan   Problem List Items Addressed This Visit             ICD-10-CM    Squamous cell cancer, anus (Multi) - Primary C21.0    Relevant Medications    LORazepam (Ativan) 1 mg tablet     Doing well after chemoradiation for anal squamous cell carcinoma which was completed in September 2024.  No evidence of persistent disease on exam today.  He has follow-up with medical and radiation oncology in the coming month.  Also has a CT scan and MRI scheduled.  Have ordered a tablet of Ativan for her to receive the MRI.  Previously used a 1 mg tablet and was able to complete the MRI.  She will follow-up with me in 6 months for another anoscopy.      Luz Zhao MD  Patient ID: Raquel Palacios \"Lupe" is a 69 y.o. female.    Anoscopy    Date/Time: 3/18/2025 9:49 AM    Performed by: Luz Zhao MD  Authorized by: Luz Zhao MD    Consent:     Consent obtained:  Verbal and written    Consent given by:  Patient    Risks, benefits, and alternatives were discussed: yes      Risks discussed:  Bleeding and pain    Alternatives discussed:  No treatment and alternative treatment  Universal protocol:     Procedure explained and questions answered to patient or proxy's satisfaction: yes      Immediately prior to procedure, a time out was called: yes      Patient identity confirmed:  Verbally with patient  Indications:     Indications comment:  Hx anal scc  Post-procedure details:     Procedure completion:  Tolerated well, no immediate complications  Comments:      There is mild erythema and telangiectasias in the left posterior anal canal, " scarring has mostly resolved at this point.  No blood or concerning lesions.  No pathology to the perianal skin.

## 2025-03-31 ENCOUNTER — APPOINTMENT (OUTPATIENT)
Dept: RADIOLOGY | Facility: HOSPITAL | Age: 70
End: 2025-03-31
Payer: MEDICARE

## 2025-03-31 ENCOUNTER — APPOINTMENT (OUTPATIENT)
Dept: RADIOLOGY | Facility: CLINIC | Age: 70
End: 2025-03-31
Payer: MEDICARE

## 2025-03-31 DIAGNOSIS — C21.0 SQUAMOUS CELL CANCER, ANUS (MULTI): Primary | ICD-10-CM

## 2025-04-04 ENCOUNTER — APPOINTMENT (OUTPATIENT)
Dept: RADIATION ONCOLOGY | Facility: CLINIC | Age: 70
End: 2025-04-04
Payer: MEDICARE

## 2025-04-07 DIAGNOSIS — C21.0 SQUAMOUS CELL CANCER, ANUS (MULTI): Primary | ICD-10-CM

## 2025-04-08 ENCOUNTER — LAB REQUISITION (OUTPATIENT)
Dept: LAB | Facility: HOSPITAL | Age: 70
End: 2025-04-08
Payer: MEDICARE

## 2025-04-08 DIAGNOSIS — C21.0 MALIGNANT NEOPLASM OF ANUS, UNSPECIFIED: ICD-10-CM

## 2025-04-08 LAB
CREAT SERPL-MCNC: 0.86 MG/DL (ref 0.5–1.05)
EGFRCR SERPLBLD CKD-EPI 2021: 74 ML/MIN/1.73M*2

## 2025-04-08 PROCEDURE — 82565 ASSAY OF CREATININE: CPT

## 2025-04-11 ENCOUNTER — HOSPITAL ENCOUNTER (OUTPATIENT)
Dept: RADIOLOGY | Facility: HOSPITAL | Age: 70
Discharge: HOME | End: 2025-04-11
Payer: MEDICARE

## 2025-04-11 DIAGNOSIS — C21.0 SQUAMOUS CELL CANCER, ANUS (MULTI): ICD-10-CM

## 2025-04-11 PROCEDURE — 2550000001 HC RX 255 CONTRASTS: Performed by: INTERNAL MEDICINE

## 2025-04-11 PROCEDURE — 71260 CT THORAX DX C+: CPT

## 2025-04-11 RX ADMIN — IOHEXOL 75 ML: 350 INJECTION, SOLUTION INTRAVENOUS at 11:16

## 2025-04-15 ENCOUNTER — APPOINTMENT (OUTPATIENT)
Dept: RADIOLOGY | Facility: HOSPITAL | Age: 70
End: 2025-04-15
Payer: MEDICARE

## 2025-04-16 ENCOUNTER — APPOINTMENT (OUTPATIENT)
Dept: HEMATOLOGY/ONCOLOGY | Facility: HOSPITAL | Age: 70
End: 2025-04-16
Payer: MEDICARE

## 2025-04-16 ENCOUNTER — LAB (OUTPATIENT)
Dept: LAB | Facility: HOSPITAL | Age: 70
End: 2025-04-16
Payer: MEDICARE

## 2025-04-16 VITALS
TEMPERATURE: 96.8 F | DIASTOLIC BLOOD PRESSURE: 88 MMHG | HEART RATE: 77 BPM | WEIGHT: 164.35 LBS | SYSTOLIC BLOOD PRESSURE: 135 MMHG | RESPIRATION RATE: 18 BRPM | OXYGEN SATURATION: 99 % | BODY MASS INDEX: 26.53 KG/M2

## 2025-04-16 DIAGNOSIS — I31.39 PERICARDIAL EFFUSION (HHS-HCC): ICD-10-CM

## 2025-04-16 DIAGNOSIS — C21.0 SQUAMOUS CELL CANCER, ANUS (MULTI): Primary | ICD-10-CM

## 2025-04-16 DIAGNOSIS — N13.30 HYDRONEPHROSIS, UNSPECIFIED HYDRONEPHROSIS TYPE: ICD-10-CM

## 2025-04-16 LAB
ALBUMIN SERPL BCP-MCNC: 4.5 G/DL (ref 3.4–5)
ALP SERPL-CCNC: 102 U/L (ref 33–136)
ALT SERPL W P-5'-P-CCNC: 16 U/L (ref 7–45)
ANION GAP SERPL CALC-SCNC: 10 MMOL/L (ref 10–20)
AST SERPL W P-5'-P-CCNC: 16 U/L (ref 9–39)
BASOPHILS # BLD AUTO: 0.02 X10*3/UL (ref 0–0.1)
BASOPHILS NFR BLD AUTO: 0.5 %
BILIRUB SERPL-MCNC: 0.7 MG/DL (ref 0–1.2)
BUN SERPL-MCNC: 16 MG/DL (ref 6–23)
CALCIUM SERPL-MCNC: 9.6 MG/DL (ref 8.6–10.3)
CHLORIDE SERPL-SCNC: 105 MMOL/L (ref 98–107)
CO2 SERPL-SCNC: 26 MMOL/L (ref 21–32)
CREAT SERPL-MCNC: 0.89 MG/DL (ref 0.5–1.05)
EGFRCR SERPLBLD CKD-EPI 2021: 71 ML/MIN/1.73M*2
EOSINOPHIL # BLD AUTO: 0.08 X10*3/UL (ref 0–0.7)
EOSINOPHIL NFR BLD AUTO: 2.1 %
ERYTHROCYTE [DISTWIDTH] IN BLOOD BY AUTOMATED COUNT: 16 % (ref 11.5–14.5)
FERRITIN SERPL-MCNC: 32 NG/ML (ref 8–150)
GLUCOSE SERPL-MCNC: 85 MG/DL (ref 74–99)
HCT VFR BLD AUTO: 35.9 % (ref 36–46)
HGB BLD-MCNC: 11.8 G/DL (ref 12–16)
IMM GRANULOCYTES # BLD AUTO: 0.01 X10*3/UL (ref 0–0.7)
IMM GRANULOCYTES NFR BLD AUTO: 0.3 % (ref 0–0.9)
IRON SATN MFR SERPL: 14 % (ref 25–45)
IRON SERPL-MCNC: 57 UG/DL (ref 35–150)
LDH SERPL L TO P-CCNC: 190 U/L (ref 84–246)
LYMPHOCYTES # BLD AUTO: 0.7 X10*3/UL (ref 1.2–4.8)
LYMPHOCYTES NFR BLD AUTO: 18 %
MCH RBC QN AUTO: 31.8 PG (ref 26–34)
MCHC RBC AUTO-ENTMCNC: 32.9 G/DL (ref 32–36)
MCV RBC AUTO: 97 FL (ref 80–100)
MONOCYTES # BLD AUTO: 0.44 X10*3/UL (ref 0.1–1)
MONOCYTES NFR BLD AUTO: 11.3 %
NEUTROPHILS # BLD AUTO: 2.63 X10*3/UL (ref 1.2–7.7)
NEUTROPHILS NFR BLD AUTO: 67.8 %
NRBC BLD-RTO: 0 /100 WBCS (ref 0–0)
PLATELET # BLD AUTO: 229 X10*3/UL (ref 150–450)
POTASSIUM SERPL-SCNC: 3.9 MMOL/L (ref 3.5–5.3)
PROT SERPL-MCNC: 7.3 G/DL (ref 6.4–8.2)
RBC # BLD AUTO: 3.71 X10*6/UL (ref 4–5.2)
SODIUM SERPL-SCNC: 137 MMOL/L (ref 136–145)
TIBC SERPL-MCNC: 413 UG/DL (ref 240–445)
UIBC SERPL-MCNC: 356 UG/DL (ref 110–370)
WBC # BLD AUTO: 3.9 X10*3/UL (ref 4.4–11.3)

## 2025-04-16 PROCEDURE — 1126F AMNT PAIN NOTED NONE PRSNT: CPT | Performed by: INTERNAL MEDICINE

## 2025-04-16 PROCEDURE — 83550 IRON BINDING TEST: CPT

## 2025-04-16 PROCEDURE — 1159F MED LIST DOCD IN RCRD: CPT | Performed by: INTERNAL MEDICINE

## 2025-04-16 PROCEDURE — G2211 COMPLEX E/M VISIT ADD ON: HCPCS | Performed by: INTERNAL MEDICINE

## 2025-04-16 PROCEDURE — 85025 COMPLETE CBC W/AUTO DIFF WBC: CPT

## 2025-04-16 PROCEDURE — 36415 COLL VENOUS BLD VENIPUNCTURE: CPT

## 2025-04-16 PROCEDURE — 82728 ASSAY OF FERRITIN: CPT

## 2025-04-16 PROCEDURE — 83615 LACTATE (LD) (LDH) ENZYME: CPT

## 2025-04-16 PROCEDURE — 99215 OFFICE O/P EST HI 40 MIN: CPT | Performed by: INTERNAL MEDICINE

## 2025-04-16 PROCEDURE — 80053 COMPREHEN METABOLIC PANEL: CPT

## 2025-04-16 PROCEDURE — 83540 ASSAY OF IRON: CPT

## 2025-04-16 PROCEDURE — 3079F DIAST BP 80-89 MM HG: CPT | Performed by: INTERNAL MEDICINE

## 2025-04-16 PROCEDURE — 3075F SYST BP GE 130 - 139MM HG: CPT | Performed by: INTERNAL MEDICINE

## 2025-04-16 ASSESSMENT — ENCOUNTER SYMPTOMS
RECTAL PAIN: 1
BLOOD IN STOOL: 0
ABDOMINAL DISTENTION: 0
CONSTITUTIONAL NEGATIVE: 1
CARDIOVASCULAR NEGATIVE: 1
MUSCULOSKELETAL NEGATIVE: 1
DIARRHEA: 1
ABDOMINAL PAIN: 0

## 2025-04-16 ASSESSMENT — PAIN SCALES - GENERAL: PAINLEVEL_OUTOF10: 0-NO PAIN

## 2025-04-16 NOTE — PROGRESS NOTES
Patient ID: Mary Palacios is a 69 y.o. female.    Subjective:  Returns for follow up for anal cancer.   Feels OK. Denies fatigue.   Night sweats+.     Heme/Onc History:  Stage/Status:  - p/w anal mass without pain in June 2024. Colonoscopy in Jul 2024: Neg except perianal mass. Bx showed p16 and p40 positive SCC  - PET/CT (Aug 2024): Uptake in anus. No LAPs. MR rectum (Aug 2024): 2.6 cm restricting lesion in inferior anal canal without LAPs  - Deemed not to be amenable to complete resection => Nigor protocol (chemoRT with capecitabine/mitomycin) (Aug - Sep 2024)  - CT a/p (Dec 2024): No sign of recurrence. Anoscopy to be done.    Assessment/Plan:  ? Anal cancer: T2N0M0. Not resectable. Completed Irina protocol with capecitabine. Last ansocopy in Mar 2025 was OK. I reviewed CT report and images from April 2025 => No sign of recurrence.     Will repeat scans in 4 months.     CT showed mild R hydronephrosis> Could it be due to radiation? Will refer to Urology    Also CT showed pericardial effusion => Echo and cardiology referral    Review Of Systems:  Review of Systems   Constitutional: Negative.    HENT:  Negative.     Cardiovascular: Negative.    Gastrointestinal:  Positive for diarrhea and rectal pain. Negative for abdominal distention, abdominal pain and blood in stool.   Genitourinary: Negative.     Musculoskeletal: Negative.        Physical Exam:  BP (S) 135/88 (BP Location: Right arm, Patient Position: Sitting)   Pulse 77   Temp 36 °C (96.8 °F) (Temporal)   Resp 18   Wt 74.6 kg (164 lb 5.7 oz)   SpO2 99%   BMI 26.53 kg/m²   BSA: 1.86 meters squared  Performance Status: Asymptomatic  Physical Exam  Constitutional:       Appearance: Normal appearance.   HENT:      Head: Normocephalic and atraumatic.   Eyes:      Pupils: Pupils are equal, round, and reactive to light.   Cardiovascular:      Rate and Rhythm: Normal rate and regular rhythm.   Pulmonary:      Effort: Pulmonary effort is normal.   Abdominal:       General: Abdomen is flat.      Palpations: Abdomen is soft. There is no mass.   Musculoskeletal:      Right lower leg: No edema.      Left lower leg: No edema.   Lymphadenopathy:      Cervical: No cervical adenopathy.   Skin:     Coloration: Skin is not jaundiced.   Neurological:      General: No focal deficit present.      Mental Status: She is alert and oriented to person, place, and time.         Results:  Diagnostic Results   Lab Results   Component Value Date    WBC 5.7 12/10/2024    HGB 11.4 (L) 12/10/2024    HCT 34.6 (L) 12/10/2024     (H) 12/10/2024     12/10/2024     Lab Results   Component Value Date    CALCIUM 8.7 12/10/2024     12/10/2024    K 3.0 (L) 12/10/2024    CO2 27 12/10/2024     12/10/2024    BUN 8 12/10/2024    CREATININE 0.86 04/08/2025    ALT 11 12/10/2024    AST 17 12/10/2024       Current Outpatient Medications:     acetaminophen (Tylenol) 325 mg capsule, Take by mouth., Disp: , Rfl:     amLODIPine (Norvasc) 10 mg tablet, Take 1 tablet (10 mg) by mouth once daily., Disp: , Rfl:     atorvastatin (Lipitor) 20 mg tablet, Take 1 tablet (20 mg) by mouth once daily., Disp: , Rfl:     azelastine (Optivar) 0.05 % ophthalmic solution, , Disp: , Rfl:     busPIRone (Buspar) 10 mg tablet, Take 1 tablet (10 mg) by mouth once daily., Disp: , Rfl:     ergocalciferol (Vitamin D-2) 1.25 MG (63384 UT) capsule, Take 1 capsule (50,000 Units) by mouth once a week., Disp: , Rfl:     escitalopram (Lexapro) 20 mg tablet, Take 1 tablet (20 mg) by mouth once daily., Disp: , Rfl:     famotidine (Pepcid) 40 mg tablet, Take 1 tablet (40 mg) by mouth 2 times a day., Disp: 180 tablet, Rfl: 0    fexofenadine (Allegra) 180 mg tablet, Take 1 tablet (180 mg) by mouth once daily., Disp: , Rfl:     hydrocortisone (Anusol-HC) 2.5 % rectal cream, Insert into the rectum twice a day. As needed, Disp: , Rfl:     loperamide (Imodium) 2 mg capsule, Take 2 capsules (4 mg) by mouth with the first episode  of diarrhea and 1 capsule (2 mg) by mouth with any additional episodes. Maximum 8 capsules (16 mg) per day., Disp: 100 capsule, Rfl: 2    LORazepam (Ativan) 1 mg tablet, Take 1 tablet (1 mg) by mouth every 6 hours if needed for anxiety (MRI) for up to 1 dose., Disp: 1 tablet, Rfl: 0    losartan (Cozaar) 100 mg tablet, Take 1 tablet (100 mg) by mouth once daily., Disp: , Rfl:     meclizine (Antivert) 12.5 mg tablet, Take 1 tablet (12.5 mg) by mouth every 6 hours if needed., Disp: , Rfl:     ondansetron (Zofran) 8 mg tablet, Take 1 tablet (8 mg) by mouth every 8 hours if needed for nausea or vomiting., Disp: 30 tablet, Rfl: 1    sucralfate (Carafate) 1 gram tablet, Take 1 tablet (1 g) by mouth 2 times a day before meals. (Patient taking differently: Take 1 tablet (1 g) by mouth once daily.), Disp: 60 tablet, Rfl: 1     Past Surgical History:   Procedure Laterality Date    COLONOSCOPY  07/2024    ESOPHAGOGASTRODUODENOSCOPY      HERNIA REPAIR  1978     Family History   Problem Relation Name Age of Onset    Colon cancer Mother Reglashara Lermaa     Melanoma Mother Reglashara Lermaa     Cancer Mother Regla Lermaa     Prostate cancer Father Lester Fernández     Cancer Father Lesetr Fernández     Breast cancer Mother's Sister      Lung cancer Mother's Sister      Lung cancer Mother's Brother      Cancer Mother's Sister Sylvie Mckeonprasannaan     Cancer Mother's Sister Piper Louie     Cancer Mother's Brother Anatoly Cool       reports that she has been smoking cigarettes. She has a 7.5 pack-year smoking history. She has been exposed to tobacco smoke. She has never used smokeless tobacco.  Social History     Socioeconomic History    Marital status:      Spouse name: Not on file    Number of children: Not on file    Years of education: Not on file    Highest education level: Not on file   Occupational History    Not on file   Tobacco Use    Smoking status: Every Day     Current packs/day: 0.50     Average packs/day: 0.5 packs/day for 15.0  years (7.5 ttl pk-yrs)     Types: Cigarettes     Passive exposure: Past    Smokeless tobacco: Never    Tobacco comments:     Smoked last this morning   Vaping Use    Vaping status: Never Used   Substance and Sexual Activity    Alcohol use: Never    Drug use: Yes     Types: Marijuana     Comment: in evenings for nausea    Sexual activity: Not Currently   Other Topics Concern    Not on file   Social History Narrative    Not on file     Social Drivers of Health     Financial Resource Strain: Low Risk  (7/24/2024)    Overall Financial Resource Strain (CARDIA)     Difficulty of Paying Living Expenses: Not hard at all   Food Insecurity: No Food Insecurity (7/24/2024)    Hunger Vital Sign     Worried About Running Out of Food in the Last Year: Never true     Ran Out of Food in the Last Year: Never true   Transportation Needs: No Transportation Needs (7/24/2024)    PRAPARE - Transportation     Lack of Transportation (Medical): No     Lack of Transportation (Non-Medical): No   Physical Activity: Inactive (7/24/2024)    Exercise Vital Sign     Days of Exercise per Week: 0 days     Minutes of Exercise per Session: 0 min   Stress: No Stress Concern Present (4/16/2025)    [employer] [place] [employer] - Occupational Stress Questionnaire     Feeling of Stress : Not at all   Social Connections: Moderately Integrated (7/24/2024)    Social Connection and Isolation Panel [NHANES]     Frequency of Communication with Friends and Family: More than three times a week     Frequency of Social Gatherings with Friends and Family: Three times a week     Attends Hinduism Services: More than 4 times per year     Active Member of Clubs or Organizations: Yes     Attends Club or Organization Meetings: 1 to 4 times per year     Marital Status:    Intimate Partner Violence: Not At Risk (7/24/2024)    Humiliation, Afraid, Rape, and Kick questionnaire     Fear of Current or Ex-Partner: No     Emotionally Abused: No     Physically  Abused: No     Sexually Abused: No   Housing Stability: Low Risk  (7/24/2024)    Housing Stability Vital Sign     Unable to Pay for Housing in the Last Year: No     Number of Times Moved in the Last Year: 1     Homeless in the Last Year: No       Diagnoses and all orders for this visit:  Squamous cell cancer, anus (Multi)  -     Clinic Appointment Request  -     CBC and Auto Differential; Future  -     Comprehensive Metabolic Panel; Future  -     Lactate Dehydrogenase; Future  -     Ferritin; Future  -     Iron and TIBC; Future  -     Clinic Appointment Request; Future  -     CBC and Auto Differential; Future  -     Comprehensive Metabolic Panel; Future  -     CT chest abdomen pelvis w IV contrast; Future  -     Transthoracic Echo Complete; Future  -     Referral to Cardiology; Future  -     Referral to Urology; Future  Pericardial effusion ([facility]HS-HCC)  -     Transthoracic Echo Complete; Future  -     Referral to Cardiology; Future  Hydronephrosis, unspecified hydronephrosis type  -     Referral to Urology; Future       Cristian Álvarez MD

## 2025-04-18 ENCOUNTER — APPOINTMENT (OUTPATIENT)
Dept: RADIATION ONCOLOGY | Facility: CLINIC | Age: 70
End: 2025-04-18
Payer: MEDICARE

## 2025-04-22 ENCOUNTER — APPOINTMENT (OUTPATIENT)
Dept: CARDIOLOGY | Facility: CLINIC | Age: 70
End: 2025-04-22
Payer: MEDICARE

## 2025-04-22 VITALS
SYSTOLIC BLOOD PRESSURE: 125 MMHG | DIASTOLIC BLOOD PRESSURE: 78 MMHG | OXYGEN SATURATION: 99 % | WEIGHT: 163 LBS | HEIGHT: 66 IN | BODY MASS INDEX: 26.2 KG/M2 | HEART RATE: 91 BPM

## 2025-04-22 DIAGNOSIS — I10 HYPERTENSION, UNSPECIFIED TYPE: Primary | ICD-10-CM

## 2025-04-22 DIAGNOSIS — I31.39 PERICARDIAL EFFUSION (HHS-HCC): ICD-10-CM

## 2025-04-22 DIAGNOSIS — C21.0 SQUAMOUS CELL CANCER, ANUS (MULTI): ICD-10-CM

## 2025-04-22 DIAGNOSIS — E78.5 DYSLIPIDEMIA: ICD-10-CM

## 2025-04-22 PROCEDURE — 3008F BODY MASS INDEX DOCD: CPT | Performed by: NURSE PRACTITIONER

## 2025-04-22 PROCEDURE — 3074F SYST BP LT 130 MM HG: CPT | Performed by: NURSE PRACTITIONER

## 2025-04-22 PROCEDURE — 3078F DIAST BP <80 MM HG: CPT | Performed by: NURSE PRACTITIONER

## 2025-04-22 PROCEDURE — G2211 COMPLEX E/M VISIT ADD ON: HCPCS | Performed by: NURSE PRACTITIONER

## 2025-04-22 PROCEDURE — 1159F MED LIST DOCD IN RCRD: CPT | Performed by: NURSE PRACTITIONER

## 2025-04-22 PROCEDURE — 99204 OFFICE O/P NEW MOD 45 MIN: CPT | Performed by: NURSE PRACTITIONER

## 2025-04-22 NOTE — PROGRESS NOTES
Primary Care Physician: Jeremias Rubin DO      Date of Visit: 2025 10:30 AM EDT  Location of visit:  [facility]   Type of Visit: New Patient        Chief Complaint   Patient presents with    New Patient Visit     Here for abnormal ct scan which showed some fluid around the heart         HPI / Summary:   Raquel Palacios is a 69 y.o. female who presents to establish cardiac care   Past medical history significant for HTN, HLD & anxiety.   Anal squamous cell carcinoma, s/p chemoradiation treatment with capecitabine/ mitomycin, completed 2024.     She was incidentally found to have a moderate pericardial effusion on surveillance CT scan with oncology (Dr. Álvarez)     Has dyspnea when waking up in the morning for the past couple of weeks which  Gradually improves / resolves through the morning and feels similar in character to chronic class III dyspnea on heavy exertion secondary to smoking   Smokes cigarettes since age 18,  not currently interested in quitting   Denies any personal cardiac history.  Reports a remote stress test as normal     FH   Dad hx PCI,   age 80       12 system review is negative except as noted above      Medical History:   Medical History[1]    Surgical History:   Surgical History[2]    Family History:   Family History[3]    Social History:   Tobacco Use: High Risk (2025)    Patient History     Smoking Tobacco Use: Every Day     Smokeless Tobacco Use: Never     Passive Exposure: Past             MEDICATIONS:   Current Outpatient Medications   Medication Instructions    acetaminophen (Tylenol) 325 mg capsule Take by mouth.    amLODIPine (NORVASC) 10 mg, Daily RT    atorvastatin (LIPITOR) 20 mg, Daily RT    azelastine (Optivar) 0.05 % ophthalmic solution     busPIRone (BUSPAR) 10 mg, Daily RT    ergocalciferol (VITAMIN D-2) 50,000 Units, Weekly    escitalopram (LEXAPRO) 20 mg, Daily    famotidine (PEPCID) 40 mg, oral, 2 times daily    fexofenadine (ALLEGRA) 180 mg,  Daily    hydrocortisone (Anusol-HC) 2.5 % rectal cream 2 times daily    loperamide (Imodium) 2 mg capsule Take 2 capsules (4 mg) by mouth with the first episode of diarrhea and 1 capsule (2 mg) by mouth with any additional episodes. Maximum 8 capsules (16 mg) per day.    LORazepam (ATIVAN) 1 mg, oral, Every 6 hours PRN    losartan (COZAAR) 100 mg, Daily RT    meclizine (ANTIVERT) 12.5 mg, Every 6 hours PRN    ondansetron (ZOFRAN) 8 mg, oral, Every 8 hours PRN         IMAGING REPORTS INDEPENDENTLY REVIEWED:     CT chest / abdomen/ pelvis 4/11/2025  IMPRESSION:  Anal cancer restaging scan compared to 12/11/2024. No CT evidence of  locoregional disease recurrence, though rectal evaluation is limited  by decompression. New 5 mm pulmonary nodule along the left major  fissure - recommend follow-up imaging. No other evidence of  locoregional recurrence or metastatic disease in the chest, abdomen,  or pelvis.  1. Interval increase in moderate pericardial effusion with  heterogeneous hepatic enhancement and body wall edema, suspicious for  cardiac dysfunction. Recommend correlation with echocardiogram.  2.  new mild right hydronephrosis and hydroureter extending to the  level of the ureterovesicular junction without evidence of definite  stone or mass. Radiation associated early fibrosis can not be ruled  out. Further evaluation with CT urogram is strongly recommended.        LABS:  CBC:   Lab Results   Component Value Date    WBC 3.9 (L) 04/16/2025    RBC 3.71 (L) 04/16/2025    HGB 11.8 (L) 04/16/2025    HCT 35.9 (L) 04/16/2025    MCV 97 04/16/2025    MCH 31.8 04/16/2025    MCHC 32.9 04/16/2025    RDW 16.0 (H) 04/16/2025     04/16/2025     CBC with Differential:    Lab Results   Component Value Date    WBC 3.9 (L) 04/16/2025    RBC 3.71 (L) 04/16/2025    HGB 11.8 (L) 04/16/2025    HCT 35.9 (L) 04/16/2025     04/16/2025    MCV 97 04/16/2025    MCH 31.8 04/16/2025    MCHC 32.9 04/16/2025    RDW 16.0 (H)  "04/16/2025    NRBC 0.0 04/16/2025    LYMPHOPCT 18.0 04/16/2025    MONOPCT 11.3 04/16/2025    EOSPCT 2.1 04/16/2025    BASOPCT 0.5 04/16/2025    MONOSABS 0.44 04/16/2025    LYMPHSABS 0.70 (L) 04/16/2025    EOSABS 0.08 04/16/2025    BASOSABS 0.02 04/16/2025     CMP:    Lab Results   Component Value Date     04/16/2025    K 3.9 04/16/2025     04/16/2025    CO2 26 04/16/2025    BUN 16 04/16/2025    CREATININE 0.89 04/16/2025    GLUCOSE 85 04/16/2025    PROT 7.3 04/16/2025    CALCIUM 9.6 04/16/2025    BILITOT 0.7 04/16/2025    ALKPHOS 102 04/16/2025    AST 16 04/16/2025    ALT 16 04/16/2025     BMP:    Lab Results   Component Value Date     04/16/2025    K 3.9 04/16/2025     04/16/2025    CO2 26 04/16/2025    BUN 16 04/16/2025    CREATININE 0.89 04/16/2025    CALCIUM 9.6 04/16/2025    GLUCOSE 85 04/16/2025     Magnesium:  Lab Results   Component Value Date    MG 2.03 07/24/2024     Troponin:  No results found for: \"TROPHS\"  BNP: No results found for: \"BNP\"    Lipid Panel:  No results found for: \"HDL\", \"CHHDL\", \"VLDL\", \"TRIG\", \"NHDL\"     Lab work and imaging results independently reviewed by me         Visit Vitals  /78   Pulse 91   Ht 1.676 m (5' 6\")   Wt 73.9 kg (163 lb)   SpO2 99%   BMI 26.31 kg/m²   OB Status Postmenopausal   Smoking Status Every Day   BSA 1.85 m²          ECG dated 4/22/2025 independently reviewed   NSR 77,  low voltage        Constitutional:       Appearance: Healthy appearance. Not in distress.   Eyes:      Conjunctiva/sclera: Conjunctivae normal.   Neck:      Vascular: JVD normal.   Pulmonary:      Effort: Pulmonary effort is normal.      Breath sounds: Normal breath sounds.   Cardiovascular:      PMI at left midclavicular line. Normal rate. Regular rhythm. Normal S1. Normal S2.       Murmurs: There is no murmur.      No rub.   Pulses:     Intact distal pulses.   Edema:     Peripheral edema absent.   Abdominal:      General: Bowel sounds are normal.   Musculoskeletal: "      Cervical back: Neck supple. Skin:     General: Skin is warm and dry.   Neurological:      Mental Status: Alert and oriented to person, place and time.               Problem List Items Addressed This Visit           ICD-10-CM    HTN (hypertension) - Primary I10    Relevant Orders    ECG 12 Lead (Completed)    Squamous cell cancer, anus (Multi) C21.0    Relevant Orders    ECG 12 Lead (Completed)    Dyslipidemia E78.5     Other Visit Diagnoses         Codes      Pericardial effusion ([facility])     I31.39    Relevant Orders    ECG 12 Lead (Completed)    Transthoracic Echo (TTE) Complete    Sedimentation rate, automated    C-Reactive Protein            She currently has no bothersome symptoms and physical exam is unremarkable for acute tamponade.  EKG is low voltage   She has an echocardiogram pending   Check ESR/CRP     The case was discussed with Dr. Spencer,  who will review the echocardiogram.  Further recommendations to follow     04/30/25 at 11:35 AM - TICO Ordonez-CNP        Followup Appts:  Future Appointments   Date Time Provider Department Center   4/30/2025  1:45 PM GEA MRI 1 GEAMRI [place] [facility]   5/2/2025  1:00 PM Jorge Padilla MD [id number] [facility]   7/8/2025 10:30 AM Anant Callahan MD [id number] [facility]   8/6/2025 10:15 AM GEN CT 1 [facility]   8/27/2025 11:20 AM Cristian Álvarez MD [id number] [facility]   9/18/2025 10:00 AM Luz Zhao MD [id number] [facility]          [1]   Past Medical History:  Diagnosis Date    Acute pain of right shoulder 09/15/2016    Anal cancer (Multi)     Chest pain 07/24/2016    Depression 01/23/2012    GERD (gastroesophageal reflux disease) 05/15/2015    Hypertension     Nausea 02/18/2022    Squamous cell carcinoma of anus     Vitamin D deficiency 12/14/2015   [2]   Past Surgical History:  Procedure Laterality Date    COLONOSCOPY  07/2024    ESOPHAGOGASTRODUODENOSCOPY      HERNIA REPAIR  1978   [3]   Family History  Problem Relation Name Age of Onset    Colon cancer Mother Regla  Jolene     Melanoma Mother Reglashara Lermaa     Cancer Mother Regla Fernández     Prostate cancer Father Lester Fernández     Cancer Father Lester Fernández     Breast cancer Mother's Sister      Lung cancer Mother's Sister      Lung cancer Mother's Brother      Cancer Mother's Sister Sylvie Hanneman     Cancer Mother's Sister Piper Louie     Cancer Mother's Brother Anatoly Cool

## 2025-04-24 ENCOUNTER — HOSPITAL ENCOUNTER (OUTPATIENT)
Dept: CARDIOLOGY | Facility: HOSPITAL | Age: 70
Discharge: HOME | End: 2025-04-24
Payer: MEDICARE

## 2025-04-24 DIAGNOSIS — I10 HYPERTENSION, UNSPECIFIED TYPE: ICD-10-CM

## 2025-04-24 DIAGNOSIS — I31.39 PERICARDIAL EFFUSION (HHS-HCC): ICD-10-CM

## 2025-04-24 DIAGNOSIS — C21.0 SQUAMOUS CELL CANCER, ANUS (MULTI): ICD-10-CM

## 2025-04-24 LAB
ATRIAL RATE: 77 BPM
P AXIS: 56 DEGREES
P OFFSET: 195 MS
P ONSET: 154 MS
PR INTERVAL: 140 MS
Q ONSET: 224 MS
QRS COUNT: 12 BEATS
QRS DURATION: 64 MS
QT INTERVAL: 394 MS
QTC CALCULATION(BAZETT): 445 MS
QTC FREDERICIA: 428 MS
R AXIS: 31 DEGREES
T AXIS: 57 DEGREES
T OFFSET: 421 MS
VENTRICULAR RATE: 77 BPM

## 2025-04-24 PROCEDURE — 93005 ELECTROCARDIOGRAM TRACING: CPT

## 2025-04-30 ENCOUNTER — HOSPITAL ENCOUNTER (OUTPATIENT)
Dept: RADIOLOGY | Facility: HOSPITAL | Age: 70
Discharge: HOME | End: 2025-04-30
Payer: MEDICARE

## 2025-04-30 DIAGNOSIS — K62.89 MASS OF ANUS: Primary | ICD-10-CM

## 2025-04-30 DIAGNOSIS — C21.0 SQUAMOUS CELL CANCER, ANUS (MULTI): ICD-10-CM

## 2025-04-30 PROCEDURE — 72197 MRI PELVIS W/O & W/DYE: CPT

## 2025-04-30 PROCEDURE — 2550000001 HC RX 255 CONTRASTS: Mod: JZ | Performed by: STUDENT IN AN ORGANIZED HEALTH CARE EDUCATION/TRAINING PROGRAM

## 2025-04-30 PROCEDURE — A9575 INJ GADOTERATE MEGLUMI 0.1ML: HCPCS | Mod: JZ | Performed by: STUDENT IN AN ORGANIZED HEALTH CARE EDUCATION/TRAINING PROGRAM

## 2025-04-30 PROCEDURE — 2500000004 HC RX 250 GENERAL PHARMACY W/ HCPCS (ALT 636 FOR OP/ED): Mod: JZ | Performed by: RADIOLOGY

## 2025-04-30 RX ORDER — GADOTERATE MEGLUMINE 376.9 MG/ML
0.2 INJECTION INTRAVENOUS
Status: COMPLETED | OUTPATIENT
Start: 2025-04-30 | End: 2025-04-30

## 2025-04-30 RX ADMIN — GADOTERATE MEGLUMINE 15 ML: 376.9 INJECTION INTRAVENOUS at 14:19

## 2025-04-30 RX ADMIN — GLUCAGON 1 MG: KIT at 13:35

## 2025-05-01 ENCOUNTER — HOSPITAL ENCOUNTER (OUTPATIENT)
Dept: CARDIOLOGY | Facility: HOSPITAL | Age: 70
Discharge: HOME | End: 2025-05-01
Payer: MEDICARE

## 2025-05-01 DIAGNOSIS — I31.39 PERICARDIAL EFFUSION (HHS-HCC): ICD-10-CM

## 2025-05-01 LAB
AORTIC VALVE PEAK VELOCITY: 1.18 M/S
AV PEAK GRADIENT: 6 MMHG
AVA (PEAK VEL): 1.87 CM2
EJECTION FRACTION APICAL 4 CHAMBER: 54.1
EJECTION FRACTION: 53 %
LEFT ATRIUM VOLUME AREA LENGTH INDEX BSA: 21.3 ML/M2
LEFT VENTRICLE INTERNAL DIMENSION DIASTOLE: 4.08 CM (ref 3.5–6)
LEFT VENTRICULAR OUTFLOW TRACT DIAMETER: 1.7 CM
MITRAL VALVE E/A RATIO: 0.85
RIGHT VENTRICLE FREE WALL PEAK S': 14.7 CM/S
RIGHT VENTRICLE PEAK SYSTOLIC PRESSURE: 28.8 MMHG
TRICUSPID ANNULAR PLANE SYSTOLIC EXCURSION: 1.6 CM

## 2025-05-01 PROCEDURE — 93306 TTE W/DOPPLER COMPLETE: CPT | Performed by: INTERNAL MEDICINE

## 2025-05-01 PROCEDURE — 93306 TTE W/DOPPLER COMPLETE: CPT

## 2025-05-02 ENCOUNTER — HOSPITAL ENCOUNTER (OUTPATIENT)
Dept: RADIATION ONCOLOGY | Facility: CLINIC | Age: 70
Setting detail: RADIATION/ONCOLOGY SERIES
Discharge: HOME | End: 2025-05-02
Payer: MEDICARE

## 2025-05-02 ENCOUNTER — HOSPITAL ENCOUNTER (OUTPATIENT)
Facility: HOSPITAL | Age: 70
Setting detail: SURGERY ADMIT
End: 2025-05-02
Attending: INTERNAL MEDICINE | Admitting: INTERNAL MEDICINE
Payer: MEDICARE

## 2025-05-02 ENCOUNTER — HOSPITAL ENCOUNTER (INPATIENT)
Facility: HOSPITAL | Age: 70
LOS: 4 days | Discharge: HOME | DRG: 287 | End: 2025-05-06
Attending: INTERNAL MEDICINE | Admitting: INTERNAL MEDICINE
Payer: MEDICARE

## 2025-05-02 ENCOUNTER — APPOINTMENT (OUTPATIENT)
Dept: CARDIOLOGY | Facility: HOSPITAL | Age: 70
DRG: 287 | End: 2025-05-02
Payer: MEDICARE

## 2025-05-02 VITALS
DIASTOLIC BLOOD PRESSURE: 73 MMHG | OXYGEN SATURATION: 98 % | HEART RATE: 88 BPM | SYSTOLIC BLOOD PRESSURE: 116 MMHG | RESPIRATION RATE: 18 BRPM | TEMPERATURE: 97 F | WEIGHT: 162.92 LBS | BODY MASS INDEX: 26.3 KG/M2

## 2025-05-02 DIAGNOSIS — F17.200 TOBACCO USE DISORDER: ICD-10-CM

## 2025-05-02 DIAGNOSIS — R93.1 ABNORMAL FINDINGS ON DIAGNOSTIC IMAGING OF HEART AND CORONARY CIRCULATION: ICD-10-CM

## 2025-05-02 DIAGNOSIS — K21.9 GASTROESOPHAGEAL REFLUX DISEASE WITHOUT ESOPHAGITIS: ICD-10-CM

## 2025-05-02 DIAGNOSIS — I31.39 PERICARDIAL EFFUSION (HHS-HCC): ICD-10-CM

## 2025-05-02 DIAGNOSIS — I15.9 SECONDARY HYPERTENSION: ICD-10-CM

## 2025-05-02 DIAGNOSIS — I31.39 PERICARDIAL EFFUSION (HHS-HCC): Primary | ICD-10-CM

## 2025-05-02 DIAGNOSIS — C21.0 SQUAMOUS CELL CANCER, ANUS (MULTI): Primary | ICD-10-CM

## 2025-05-02 DIAGNOSIS — R06.02 SHORTNESS OF BREATH: ICD-10-CM

## 2025-05-02 LAB
ABO GROUP (TYPE) IN BLOOD: NORMAL
ALBUMIN SERPL BCP-MCNC: 4 G/DL (ref 3.4–5)
ALP SERPL-CCNC: 85 U/L (ref 33–136)
ALT SERPL W P-5'-P-CCNC: 19 U/L (ref 7–45)
ANION GAP SERPL CALC-SCNC: 14 MMOL/L (ref 10–20)
ANTIBODY SCREEN: NORMAL
APTT PPP: 33 SECONDS (ref 26–36)
AST SERPL W P-5'-P-CCNC: 20 U/L (ref 9–39)
BASOPHILS # BLD AUTO: 0.03 X10*3/UL (ref 0–0.1)
BASOPHILS NFR BLD AUTO: 0.7 %
BILIRUB DIRECT SERPL-MCNC: 0.1 MG/DL (ref 0–0.3)
BILIRUB SERPL-MCNC: 0.7 MG/DL (ref 0–1.2)
BUN SERPL-MCNC: 15 MG/DL (ref 6–23)
CALCIUM SERPL-MCNC: 9.2 MG/DL (ref 8.6–10.6)
CHLORIDE SERPL-SCNC: 108 MMOL/L (ref 98–107)
CO2 SERPL-SCNC: 21 MMOL/L (ref 21–32)
CREAT SERPL-MCNC: 0.77 MG/DL (ref 0.5–1.05)
EGFRCR SERPLBLD CKD-EPI 2021: 85 ML/MIN/1.73M*2
EJECTION FRACTION: 63 %
EOSINOPHIL # BLD AUTO: 0.09 X10*3/UL (ref 0–0.7)
EOSINOPHIL NFR BLD AUTO: 2.2 %
ERYTHROCYTE [DISTWIDTH] IN BLOOD BY AUTOMATED COUNT: 15.5 % (ref 11.5–14.5)
GLUCOSE SERPL-MCNC: 95 MG/DL (ref 74–99)
HCT VFR BLD AUTO: 34.7 % (ref 36–46)
HGB BLD-MCNC: 11.5 G/DL (ref 12–16)
HOLD SPECIMEN: NORMAL
IMM GRANULOCYTES # BLD AUTO: 0.01 X10*3/UL (ref 0–0.7)
IMM GRANULOCYTES NFR BLD AUTO: 0.2 % (ref 0–0.9)
INR PPP: 1 (ref 0.9–1.1)
LYMPHOCYTES # BLD AUTO: 0.78 X10*3/UL (ref 1.2–4.8)
LYMPHOCYTES NFR BLD AUTO: 19.3 %
MAGNESIUM SERPL-MCNC: 1.9 MG/DL (ref 1.6–2.4)
MCH RBC QN AUTO: 31.3 PG (ref 26–34)
MCHC RBC AUTO-ENTMCNC: 33.1 G/DL (ref 32–36)
MCV RBC AUTO: 94 FL (ref 80–100)
MONOCYTES # BLD AUTO: 0.52 X10*3/UL (ref 0.1–1)
MONOCYTES NFR BLD AUTO: 12.8 %
NEUTROPHILS # BLD AUTO: 2.62 X10*3/UL (ref 1.2–7.7)
NEUTROPHILS NFR BLD AUTO: 64.8 %
NRBC BLD-RTO: 0 /100 WBCS (ref 0–0)
PHOSPHATE SERPL-MCNC: 4.5 MG/DL (ref 2.5–4.9)
PLATELET # BLD AUTO: 185 X10*3/UL (ref 150–450)
POTASSIUM SERPL-SCNC: 3.7 MMOL/L (ref 3.5–5.3)
PROT SERPL-MCNC: 6.4 G/DL (ref 6.4–8.2)
PROTHROMBIN TIME: 11.3 SECONDS (ref 9.8–12.4)
RBC # BLD AUTO: 3.68 X10*6/UL (ref 4–5.2)
RH FACTOR (ANTIGEN D): NORMAL
SODIUM SERPL-SCNC: 139 MMOL/L (ref 136–145)
WBC # BLD AUTO: 4.1 X10*3/UL (ref 4.4–11.3)

## 2025-05-02 PROCEDURE — 33016 PERICARDIOCENTESIS W/IMAGING: CPT | Performed by: INTERNAL MEDICINE

## 2025-05-02 PROCEDURE — 93451 RIGHT HEART CATH: CPT | Performed by: INTERNAL MEDICINE

## 2025-05-02 PROCEDURE — 93454 CORONARY ARTERY ANGIO S&I: CPT | Performed by: INTERNAL MEDICINE

## 2025-05-02 PROCEDURE — C1887 CATHETER, GUIDING: HCPCS | Performed by: INTERNAL MEDICINE

## 2025-05-02 PROCEDURE — 85610 PROTHROMBIN TIME: CPT

## 2025-05-02 PROCEDURE — 93010 ELECTROCARDIOGRAM REPORT: CPT | Performed by: INTERNAL MEDICINE

## 2025-05-02 PROCEDURE — C1729 CATH, DRAINAGE: HCPCS | Performed by: INTERNAL MEDICINE

## 2025-05-02 PROCEDURE — 2720000007 HC OR 272 NO HCPCS: Performed by: INTERNAL MEDICINE

## 2025-05-02 PROCEDURE — 99152 MOD SED SAME PHYS/QHP 5/>YRS: CPT | Performed by: INTERNAL MEDICINE

## 2025-05-02 PROCEDURE — 2500000001 HC RX 250 WO HCPCS SELF ADMINISTERED DRUGS (ALT 637 FOR MEDICARE OP)

## 2025-05-02 PROCEDURE — 99153 MOD SED SAME PHYS/QHP EA: CPT | Performed by: INTERNAL MEDICINE

## 2025-05-02 PROCEDURE — C1769 GUIDE WIRE: HCPCS | Performed by: INTERNAL MEDICINE

## 2025-05-02 PROCEDURE — B2111ZZ FLUOROSCOPY OF MULTIPLE CORONARY ARTERIES USING LOW OSMOLAR CONTRAST: ICD-10-PCS | Performed by: INTERNAL MEDICINE

## 2025-05-02 PROCEDURE — 87116 MYCOBACTERIA CULTURE: CPT

## 2025-05-02 PROCEDURE — 2500000002 HC RX 250 W HCPCS SELF ADMINISTERED DRUGS (ALT 637 FOR MEDICARE OP, ALT 636 FOR OP/ED)

## 2025-05-02 PROCEDURE — 83735 ASSAY OF MAGNESIUM: CPT

## 2025-05-02 PROCEDURE — 87070 CULTURE OTHR SPECIMN AEROBIC: CPT

## 2025-05-02 PROCEDURE — 99213 OFFICE O/P EST LOW 20 MIN: CPT | Performed by: STUDENT IN AN ORGANIZED HEALTH CARE EDUCATION/TRAINING PROGRAM

## 2025-05-02 PROCEDURE — 99291 CRITICAL CARE FIRST HOUR: CPT

## 2025-05-02 PROCEDURE — 86901 BLOOD TYPING SEROLOGIC RH(D): CPT

## 2025-05-02 PROCEDURE — 88112 CYTOPATH CELL ENHANCE TECH: CPT | Performed by: PATHOLOGY

## 2025-05-02 PROCEDURE — C1760 CLOSURE DEV, VASC: HCPCS | Performed by: INTERNAL MEDICINE

## 2025-05-02 PROCEDURE — 88305 TISSUE EXAM BY PATHOLOGIST: CPT | Performed by: PATHOLOGY

## 2025-05-02 PROCEDURE — 85025 COMPLETE CBC W/AUTO DIFF WBC: CPT

## 2025-05-02 PROCEDURE — 89051 BODY FLUID CELL COUNT: CPT

## 2025-05-02 PROCEDURE — 83615 LACTATE (LD) (LDH) ENZYME: CPT

## 2025-05-02 PROCEDURE — S4991 NICOTINE PATCH NONLEGEND: HCPCS

## 2025-05-02 PROCEDURE — 4A023N8 MEASUREMENT OF CARDIAC SAMPLING AND PRESSURE, BILATERAL, PERCUTANEOUS APPROACH: ICD-10-PCS | Performed by: INTERNAL MEDICINE

## 2025-05-02 PROCEDURE — 84157 ASSAY OF PROTEIN OTHER: CPT

## 2025-05-02 PROCEDURE — 2500000004 HC RX 250 GENERAL PHARMACY W/ HCPCS (ALT 636 FOR OP/ED): Mod: JZ | Performed by: INTERNAL MEDICINE

## 2025-05-02 PROCEDURE — 0W9D30Z DRAINAGE OF PERICARDIAL CAVITY WITH DRAINAGE DEVICE, PERCUTANEOUS APPROACH: ICD-10-PCS | Performed by: INTERNAL MEDICINE

## 2025-05-02 PROCEDURE — 93005 ELECTROCARDIOGRAM TRACING: CPT

## 2025-05-02 PROCEDURE — C1894 INTRO/SHEATH, NON-LASER: HCPCS | Performed by: INTERNAL MEDICINE

## 2025-05-02 PROCEDURE — 2550000001 HC RX 255 CONTRASTS: Performed by: INTERNAL MEDICINE

## 2025-05-02 PROCEDURE — 82248 BILIRUBIN DIRECT: CPT

## 2025-05-02 PROCEDURE — 82945 GLUCOSE OTHER FLUID: CPT

## 2025-05-02 PROCEDURE — 88112 CYTOPATH CELL ENHANCE TECH: CPT | Mod: TC,MCY

## 2025-05-02 PROCEDURE — 93308 TTE F-UP OR LMTD: CPT | Performed by: INTERNAL MEDICINE

## 2025-05-02 PROCEDURE — 87102 FUNGUS ISOLATION CULTURE: CPT

## 2025-05-02 PROCEDURE — 76942 ECHO GUIDE FOR BIOPSY: CPT | Performed by: INTERNAL MEDICINE

## 2025-05-02 PROCEDURE — 84100 ASSAY OF PHOSPHORUS: CPT

## 2025-05-02 PROCEDURE — 93308 TTE F-UP OR LMTD: CPT

## 2025-05-02 PROCEDURE — 2020000001 HC ICU ROOM DAILY

## 2025-05-02 PROCEDURE — 93456 R HRT CORONARY ARTERY ANGIO: CPT | Performed by: INTERNAL MEDICINE

## 2025-05-02 PROCEDURE — 80053 COMPREHEN METABOLIC PANEL: CPT

## 2025-05-02 PROCEDURE — 2780000003 HC OR 278 NO HCPCS: Performed by: INTERNAL MEDICINE

## 2025-05-02 PROCEDURE — 99203 OFFICE O/P NEW LOW 30 MIN: CPT | Performed by: STUDENT IN AN ORGANIZED HEALTH CARE EDUCATION/TRAINING PROGRAM

## 2025-05-02 RX ORDER — ACETAMINOPHEN 325 MG/1
975 TABLET ORAL 3 TIMES DAILY PRN
Status: DISCONTINUED | OUTPATIENT
Start: 2025-05-02 | End: 2025-05-06 | Stop reason: HOSPADM

## 2025-05-02 RX ORDER — AMLODIPINE BESYLATE 10 MG/1
10 TABLET ORAL
Status: DISCONTINUED | OUTPATIENT
Start: 2025-05-02 | End: 2025-05-06 | Stop reason: HOSPADM

## 2025-05-02 RX ORDER — IBUPROFEN 200 MG
1 TABLET ORAL DAILY
Status: DISCONTINUED | OUTPATIENT
Start: 2025-06-13 | End: 2025-05-06 | Stop reason: HOSPADM

## 2025-05-02 RX ORDER — VANCOMYCIN HYDROCHLORIDE 1 G/20ML
INJECTION, POWDER, LYOPHILIZED, FOR SOLUTION INTRAVENOUS AS NEEDED
Status: DISCONTINUED | OUTPATIENT
Start: 2025-05-02 | End: 2025-05-02 | Stop reason: HOSPADM

## 2025-05-02 RX ORDER — ESCITALOPRAM OXALATE 20 MG/1
20 TABLET ORAL DAILY
Status: DISCONTINUED | OUTPATIENT
Start: 2025-05-02 | End: 2025-05-06 | Stop reason: HOSPADM

## 2025-05-02 RX ORDER — NICOTINE 7MG/24HR
1 PATCH, TRANSDERMAL 24 HOURS TRANSDERMAL DAILY
Status: DISCONTINUED | OUTPATIENT
Start: 2025-06-27 | End: 2025-05-06 | Stop reason: HOSPADM

## 2025-05-02 RX ORDER — FENTANYL CITRATE 50 UG/ML
INJECTION, SOLUTION INTRAMUSCULAR; INTRAVENOUS AS NEEDED
Status: DISCONTINUED | OUTPATIENT
Start: 2025-05-02 | End: 2025-05-02 | Stop reason: HOSPADM

## 2025-05-02 RX ORDER — IBUPROFEN 200 MG
1 TABLET ORAL DAILY
Status: DISCONTINUED | OUTPATIENT
Start: 2025-05-02 | End: 2025-05-06 | Stop reason: HOSPADM

## 2025-05-02 RX ORDER — BUSPIRONE HYDROCHLORIDE 10 MG/1
10 TABLET ORAL
Status: DISCONTINUED | OUTPATIENT
Start: 2025-05-02 | End: 2025-05-06 | Stop reason: HOSPADM

## 2025-05-02 RX ORDER — ATORVASTATIN CALCIUM 20 MG/1
20 TABLET, FILM COATED ORAL
Status: DISCONTINUED | OUTPATIENT
Start: 2025-05-02 | End: 2025-05-06 | Stop reason: HOSPADM

## 2025-05-02 RX ORDER — ERGOCALCIFEROL 1.25 MG/1
1.25 CAPSULE ORAL WEEKLY
Status: DISCONTINUED | OUTPATIENT
Start: 2025-05-02 | End: 2025-05-06 | Stop reason: HOSPADM

## 2025-05-02 RX ORDER — FAMOTIDINE 20 MG/1
40 TABLET, FILM COATED ORAL 2 TIMES DAILY
Status: DISCONTINUED | OUTPATIENT
Start: 2025-05-02 | End: 2025-05-06 | Stop reason: HOSPADM

## 2025-05-02 RX ORDER — LIDOCAINE HYDROCHLORIDE 10 MG/ML
INJECTION, SOLUTION EPIDURAL; INFILTRATION; INTRACAUDAL; PERINEURAL AS NEEDED
Status: DISCONTINUED | OUTPATIENT
Start: 2025-05-02 | End: 2025-05-02 | Stop reason: HOSPADM

## 2025-05-02 RX ORDER — LOSARTAN POTASSIUM 50 MG/1
100 TABLET ORAL
Status: DISCONTINUED | OUTPATIENT
Start: 2025-05-02 | End: 2025-05-06 | Stop reason: HOSPADM

## 2025-05-02 RX ORDER — MIDAZOLAM HYDROCHLORIDE 1 MG/ML
INJECTION, SOLUTION INTRAMUSCULAR; INTRAVENOUS AS NEEDED
Status: DISCONTINUED | OUTPATIENT
Start: 2025-05-02 | End: 2025-05-02 | Stop reason: HOSPADM

## 2025-05-02 RX ORDER — IOPAMIDOL 755 MG/ML
INJECTION, SOLUTION INTRAVASCULAR AS NEEDED
Status: DISCONTINUED | OUTPATIENT
Start: 2025-05-02 | End: 2025-05-02 | Stop reason: HOSPADM

## 2025-05-02 RX ADMIN — FAMOTIDINE 40 MG: 20 TABLET ORAL at 20:40

## 2025-05-02 RX ADMIN — NICOTINE 1 PATCH: 21 PATCH, EXTENDED RELEASE TRANSDERMAL at 18:55

## 2025-05-02 RX ADMIN — ATORVASTATIN CALCIUM 20 MG: 20 TABLET, FILM COATED ORAL at 18:55

## 2025-05-02 RX ADMIN — ESCITALOPRAM OXALATE 20 MG: 20 TABLET ORAL at 18:55

## 2025-05-02 RX ADMIN — BUSPIRONE HYDROCHLORIDE 10 MG: 10 TABLET ORAL at 18:55

## 2025-05-02 ASSESSMENT — ENCOUNTER SYMPTOMS
FATIGUE: 1
CONSTIPATION: 1
CHILLS: 0
LEG SWELLING: 0
FEVER: 0
NUMBNESS: 0
WOUND: 0
BACK PAIN: 0
ADENOPATHY: 0
DYSURIA: 0
VOMITING: 0
ARTHRALGIAS: 0
UNEXPECTED WEIGHT CHANGE: 1
SHORTNESS OF BREATH: 0
EXTREMITY WEAKNESS: 0
NAUSEA: 0
HEADACHES: 0
DIARRHEA: 0
RECTAL PAIN: 0
FREQUENCY: 0

## 2025-05-02 ASSESSMENT — PAIN SCALES - GENERAL
PAINLEVEL_OUTOF10: 0-NO PAIN
PAINLEVEL_OUTOF10: 0 - NO PAIN
PAINLEVEL_OUTOF10: 0 - NO PAIN

## 2025-05-02 ASSESSMENT — PAIN - FUNCTIONAL ASSESSMENT
PAIN_FUNCTIONAL_ASSESSMENT: 0-10
PAIN_FUNCTIONAL_ASSESSMENT: 0-10

## 2025-05-02 NOTE — PROGRESS NOTES
"Per Dr. Spencer's email on 5/1/25    \"?This patient showed large pericardial effusion with early tamponade on echo done this afternoon - otherwise stable as of now - has been increasing over some time. She will need pericardiocentesis as direct transfer to cathlab from home,  followed by CICU/telemetry admission with drainage pigtail drain, in-situ. Has h/o Squamous cell CA anus, s/p full chemoradiation last year.  > Will be doing a R&LHC as well\".    \"I have already spoken to the patient and she is willing to come tomorrow afternoon. She has an appointment with Radiation Oncology tomorrow at 1PM at [place] and she will straight come to [facility] from there to the cathlab. Please make arrangements for that. There shouldn't be any need for pre-cert, as it is urgent. I have allowed her to have a light breakfast at 6:30AM and NPO afterwards. She has labs from 4/16, and they are sufficient.\"        "

## 2025-05-02 NOTE — PROGRESS NOTES
Radiation Oncology Nursing Note    Pain: The patient's current pain level was assessed.  They report currently having a pain of 0 out of 10.  They feel their pain is under control without the use of pain medications.    Review of Systems:  Review of Systems - Oncology    Patient is in today for follow up with Dr Padilla. Last treatment was 9/30/24. Today she denies pain, changes in urinary patterns, vaginal bleeding/discharge, and skin problems. She reports daily formed bowel movements that rarely have a scant amount of blood. She will follow up with this office in November 2025.    Education Documentation  When and How to Contact Clinic, taught by Dominique Cross RN at 5/2/2025  1:20 PM.  Learner: Patient  Readiness: Acceptance  Method: Explanation  Response: Verbalizes Understanding    Education Comments  No comments found.

## 2025-05-02 NOTE — PROGRESS NOTES
"Radiation Oncology Follow-Up    Patient Name:  Raquel Palacios  MRN:  [id number]  :  1955    Referring Provider: Jorge Padilla MD  Primary Care Provider: Jeremias Rubin DO  Care Team: Patient Care Team:  Jeremias Rubin DO as PCP - General (Family Medicine)  Cristian Álvarez MD as Medical Oncologist (Hematology and Oncology)    Date of Service: 2025    SUBJECTIVE  History of Present Illness:  Raquel Palacios \"Mary\" is a 69 y.o. female who was previously seen at the [facility] Department of Radiation Oncology for anal squamous cell carcinoma.      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 Palacios is a female that noted a perianal lesion in 2020 for which had become uncomfortable and swollen.  The patient had a prior history of negative colonoscopy approximately 4 years ago.  The patient was seen by general surgery for repeat colonoscopy.  The patient had evaluation which showed a anal lesion which was biopsied revealing invasive squamous cell carcinoma, moderately differentiated.  The patient underwent colonoscopy on 2024 which showed a adenomatous appearing mass at the posterior anal region covered one quarter of the circumference biopsy was obtained, diverticulosis and medium protruding hemorrhoid was noted.  Biopsy of the anus revealed squamous cell carcinoma, p16 strongly positive.     The patient underwent staging CT chest/abdomen/pelvis with IV contrast on 2024 which showed no evidence of pulmonary nodules, 1.1 cm left hepatic lobe and 9 mm right hepatic lobe hepatic cysts, diverticulosis and no evidence of osseous metastatic disease.  The patient underwent PET/CT on 2024 which revealed focal uptake in the left parotid gland consistent with Warthin tumor, focal uptake in the anus with max SUV 17.5 and no evidence of hypermetabolic abdominal pelvic or inguinal " lymphadenopathy.  On 2024 the patient underwent MRI rectum which showed an inferior anal canal extending to the perianal space a lobulated lesion measuring 2.6 x 1.3 x 2.2 cm, below the dentate line.  No evidence of pelvic or inguinal lymphadenopathy.    The patient completed concurrent chemoradiation therapy with concurrent Xeloda and Mitomycin-C with simultaneous integrated boost technique 5040 cGy in 28 fractions to the primary and to the elective pelvis and bilateral inguinal lymph nodes 4200 cGy in 28 fractions from 2024-2024.    The patient had an anoscopy on 3/18/2025 which revealed mild erythema and tail ectasias involving the left posterior anal canal, scar tissue resolved with no evidence of concerning anal or perianal skin lesions.  Postradiation reimaging MRI rectum with and without contrast on 2025 revealed posttreatment changes without residual measurable disease of mass or lymphadenopathy.    2025: The patient is scheduled for pericardiocentesis today. Urinary frequency. No issues with bowel issues.    Labs:  None     Pathology:   2024-A. ANUS BIOPSY: Squamous cell carcinoma, Note: Immunostain for p40 is positive and immunostain for p16 is diffusely positive (strong).     7/10/2024-A.  Anus, biopsy: -Invasive squamous cell carcinoma, moderately differentiated.      2024-Pap smear: Negative for intraepithelial lesion or malignancy.  Negative for HPV testing.     Disease Associated Genomics:  P16 positive     Disease Tumor Markers:   None     Imagin2025-MRI rectum with and without contrast: Expected posttreatment changes without residual measurable enhancement or mass, no evidence of lymphadenopathy.    2025-CT chest/abdomen/pelvis: No evidence of local regional disease recurrence.  New 5 mm pulmonary nodule along the left major fissure.  Moderate pericardial effusion.  Stable right upper lobe pulmonary nodule.  Heterogeneous liver parenchyma with stable  hypodense liver lesions too small to characterize.  Trace pelvic ascites, stranding within the deep pelvis and presacral regions likely representing postradiation changes.  No evidence of enlarging abdominal pelvic lymphadenopathy.  Mild right hydronephrosis and hydroureter extending to the level of the UVJ without evidence of stone or mass, radiation early fibrosis cannot be ruled out.    8/14/2024-MRI rectum: In the area of the inferior anal canal extending to the perianal space a lobulated lesion measuring 2.6 x 1.3 x 2.2 cm, below the dentate line.  No evidence of pelvic or inguinal lymphadenopathy.     8/7/2024-PET/CT: No evidence of focal hypermetabolic cervical lymphadenopathy.  Focal uptake in the left parotid gland with max SUV 4.5 consistent with Warthin tumor.  No evidence of focal hypermetabolic lesions of the lung, mediastinum, hilar or axilla.  There is focal uptake in the anus with max SUV 17.5.  No evidence of hypermetabolic lymphadenopathy.  There is no evidence of hypermetabolic osseous metastatic disease.     7/23/2024-CT chest/abdomen/pelvis with IV contrast: No evidence of pulmonary nodules, prominent but nonenlarged mediastinal lymph nodes including right lower paratracheal lymph node.  A 1.1 cm lesion in the left hepatic lobe and a 9 mm lesion within the right hepatic lobe likely hepatic cyst.  Diverticulosis without evidence of diverticulitis.  Anal lesion is not well-visualized.  Status post appendectomy.  No evidence of suspicious osseous metastatic disease.     Prior Systemic Therapies:  8/21/2024-9/30/2024: Mitomycin-C and Xeloda with radiation therapy     Prior Surgeries:  3/18/2025-anoscopy: mild erythema and telangiectasias involving the left posterior anal canal, scar tissue resolved with no evidence of concerning anal or perianal skin lesions.    7/11/2024-colonoscopy: Single adenomatous appearing mass at the posterior anal region covering one quarter of the circumference, biopsy was  obtained.  Medium protruding hemorrhoid, diverticulosis of the ascending colon, hepatic flexure, transverse colon, descending colon and sigmoid colon.     Prior Radiation Therapy:   8/21/2024-9/30/2024: Simultaneous integrated boost technique 5040 cGy in 28 fractions to the primary and to the elective pelvis and bilateral inguinal lymph nodes 4200 cGy in 28 fractions    Treatment Rendered:   IMRT: Not Applicable Anal canal, Bilateral Inguinal lymph node, Bilateral Pelvis    Treatment Period Technique Fraction Dose Fractions Total Dose   Course 1 8/21/2024-9/30/2024  (days elapsed: 40)         Anus_Pel_Ing 8/21/2024-9/30/2024 VMAT 180 / 180 cGy 28 / 28 5,040 / 5,040 cGy       Review of Systems:   Review of Systems   Constitutional:  Positive for fatigue (Improved with rest) and unexpected weight change (48 lb weigh loss over last few months, improving). Negative for chills and fever.   Respiratory:  Negative for shortness of breath.    Cardiovascular:  Negative for chest pain and leg swelling.   Gastrointestinal:  Positive for constipation. Negative for diarrhea (Improved, on carafate, senna), nausea, rectal pain and vomiting.   Genitourinary:  Negative for bladder incontinence, dysuria, frequency, vaginal bleeding and vaginal discharge.    Musculoskeletal:  Negative for arthralgias and back pain.   Skin:  Negative for wound.   Neurological:  Negative for extremity weakness, headaches and numbness.   Hematological:  Negative for adenopathy.       Performance Status:   The Karnofsky performance scale today is 80, Normal activity with effort; some signs or symptoms of disease (ECOG equivalent 1).       OBJECTIVE  Vital Signs:  /73 (Patient Position: Sitting)   Pulse 88   Temp 36.1 °C (97 °F)   Resp 18   Wt 73.9 kg (162 lb 14.7 oz)   SpO2 98%   BMI 26.30 kg/m²   Physical Exam  Vitals and nursing note reviewed. Exam conducted with a chaperone present.   HENT:      Head: Normocephalic.   Eyes:      Extraocular  Movements: Extraocular movements intact.   Cardiovascular:      Rate and Rhythm: Normal rate and regular rhythm.   Pulmonary:      Effort: Pulmonary effort is normal.   Genitourinary:     Labia:         Right: No rash or lesion.         Left: No rash or lesion.       Comments: Resolution of radiation dermatitis.  No evidence of perianal masses.    Digital rectal examination reveals no masses or nodularity, minimal scar tissue noted.  Musculoskeletal:         General: Normal range of motion.      Right lower leg: No edema.      Left lower leg: No edema.   Lymphadenopathy:      Lower Body: No right inguinal adenopathy. No left inguinal adenopathy.   Neurological:      Mental Status: She is alert.            ASSESSMENT:   Raquel Palacios is a 69 y.o. female with Squamous cell cancer, anus (Multi), Clinical: Stage IIA (cT2, cN0, cM0).      Ms. 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    The patient will follow with medical oncology for repeat systemic imaging (CT chest/abdomen/pelvis) and follow-up in 8/2025.  Colorectal surgery follow-up and examination in 9/2025.    No evidence of recurrence on clinical examination, anoscopy or on MRI pelvis.    The patient will be evaluated for pericardiocentesis due to pericardial effusion.    Follow-up in 6 months with clinical examination.    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  -Follow up in 11/2025  -Following with medical oncology in 8/2025 with CT chest/abdomen/pelvis  -Following with colorectal surgery in 9/2025 for examination and anoscopy  -Status post chemoradiation therapy    #) Acute toxicities   -Fatigue: Grade 1 improved with rest  -Radiation dermatitis: Resolved  - Pericardial effusion: Grade 3, plan for  pericardiocentesis    #) Long term side effects  - Hydronephrosis: Radiation related?,  Referred to urology for evaluation    A total of 20 minutes were spent face-to-face with the patient, with an additional 10 minutes spent reviewing records including imaging, pathology and physician notes.    Jorge Padilla MD  [facility] [place]  [facility]  - Department of Radiation Oncology  Phone: [phone]  Fax: [phone]  [employer] secure chat preferred / Pager [phone]    Note: This was transcribed using Dragon voice recognition software. Attempts were made to correct any errors; however, errors or omissions may be present.     NCCN Guidelines were applicable to guide this patients treatment plan.

## 2025-05-02 NOTE — POST-PROCEDURE NOTE
Physician Transition of Care Summary  Invasive Cardiovascular Lab    Procedure Date: 5/2/2025  Attending:    * Zeinab Spencer - Primary  Resident/Fellow/Other Assistant: Surgeons and Role:     * Zeinab Mccormick MD - Fellow    Indications:   Pre-op Diagnosis      * Pericardial effusion ([facility]) [I31.39]     * Abnormal findings on diagnostic imaging of heart and coronary circulation [R93.1]    Post-procedure diagnosis:   Post-op Diagnosis     * Pericardial effusion ([facility]) [I31.39]     * Abnormal findings on diagnostic imaging of heart and coronary circulation [R93.1]    Procedure(s):   Pericardiocentesis  47309 - [place] PERICARDIOCENTESIS W/IMG GUIDANCE WHEN PERFORMED    LHC, No LV  54044 - [place] CATH PLACEMENT & NJX CORONARY ART ANGIO IMG S&I    RHC  33158 - [place] RIGHT HEART CATH O2 SATURATION & CARDIAC OUTPUT        Procedure Findings:   R and Lhc  Pericardiocentesis     Mildly elevated filling pressures in the RA, with normal CO and CI  Non obstructive CAD in a right dominant system     Pericardiocentesis - Serosanguinous fluid ~500ml (pre cordial approach)    Pressures measured in the pericardium after the tap - showed significant reduction and was negative. RA pressures was similar to before tap.      Description of the Procedure:   As above    Complications:   Nil     Stents/Implants:   Implants       No implant documentation for this case.            Anticoagulation/Antiplatelet Plan:       Estimated Blood Loss:   10 mL    Anesthesia: Moderate Sedation Anesthesia Staff: No anesthesia staff entered.    Any Specimen(s) Removed:   Order Name Source Comment Collection Info Order Time   CBC WITH AUTO DIFFERENTIAL Blood, Venous  Collected By: Doreen Kevin RN 5/2/2025  3:08 PM     Release result to [facility]   Immediate        MAGNESIUM Blood, Venous  Collected By: Doreen Kevin RN 5/2/2025  3:08 PM     Release result to MyChart   Immediate        HEPATIC FUNCTION PANEL Blood, Venous  Collected By:  Doreen Kevin RN 5/2/2025  3:08 PM     Release result to MyChart   Immediate        COAGULATION SCREEN Blood, Venous  Collected By: Doreen Kevin RN 5/2/2025  3:08 PM     Release result to MyChart   Immediate        TYPE AND SCREEN Blood, Venous  Collected By: Doreen Kevin RN 5/2/2025  3:08 PM     Release result to MyChart   Immediate            Disposition:   CICU      Electronically signed by: Neal Escobar MD, 5/2/2025 5:31 PM

## 2025-05-02 NOTE — H&P
History Of Present Illness  Mary Palacios is a 69 y.o. 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 Álvarez, s/p chemoRT with capecitabine/mitomycin Aug-Sep 2024), HTN, HLD, tobacco use disorder, GERD, anxiety presenting as a direct admit for pericardiocentesis with plan for pigtail drain in-situ.     During recent surveillance CT for SCC, found to have a moderate pericardial effusion. Pt also endorsing dyspnea for the past several weeks. TTE 4/30 showed EF 50-55%, abnormal pattern of LV diastolic filling, normal RV systolic function, and a large pericardial effusion with early RV diastolic collapse. Plan for direct admit for pericardiocentesis with plan for pigtail drain in-situ.     At bedside, she denies any sx. States that she lost her taste and smell in Feb/March, had rhinorrhea and cough, but did not test for COVID at the time. Denies any infection since then. Denies CP, palpitations, swelling. Endorses chronic vertigo but denies LH or syncope. Endorses SOB in the AM after she drinks her coffee and smokes a few cigarettes for the past few months. Denies orthopnea. Denies rash, joint pain, or hx autoimmune disease. Denies rectal pain, bleeding, change in bowel habits, N/V. Denies difficulty urinating, dysuria, hematuria.     TTE 4/30:   CONCLUSIONS:   1. Left ventricular ejection fraction is low normal, by visual estimate at 50-55%.   2. Spectral Doppler shows an abnormal pattern of left ventricular diastolic filling.   3. There is normal right ventricular global systolic function.   4. Large pericardial effusion with early right ventricular diastolic collapse.    Past Medical History  She has a past medical history of Acute pain of right shoulder (09/15/2016), Anal cancer (Multi), Chest pain (07/24/2016), Depression (01/23/2012), GERD (gastroesophageal reflux disease) (05/15/2015), Hypertension, Nausea (02/18/2022), Squamous cell carcinoma of anus, and Vitamin D deficiency  (12/14/2015).    Surgical History  She has a past surgical history that includes Hernia repair (1978); Colonoscopy (07/2024); and Esophagogastroduodenoscopy.     Social History  She reports that she has been smoking cigarettes. She has a 7.5 pack-year smoking history. She has been exposed to tobacco smoke. She has never used smokeless tobacco. She reports current drug use. Drug: Marijuana. She reports that she does not drink alcohol.    Family History  Family History[1]     Allergies  Animal dander, House dust mite, Sulfa (sulfonamide antibiotics), Cat dander, and Codeine     Physical Exam  General: well-developed in NAD  CV: rrr, no m/r/g, warm and dry  Pulm: CTAB  GI: soft, NT, ND  Extremities: trace LE edema      Last Recorded Vitals  There were no vitals taken for this visit.    Assessment/Plan   Assessment & Plan  Pericardial effusion (HHS-HCC)    Abnormal findings on diagnostic imaging of heart and coronary circulation    Mary Palacios is a 69 y.o. 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 Álvarez, s/p chemoRT with capecitabine/mitomycin Aug-Sep 2024), HTN, HLD, tobacco use disorder, GERD, anxiety incidentally found to have pericardial effusion on surveillance CT. TTE with large pericardial effusion with early RV diastolic collapse. Direct admit for pericardiocentesis with pigtail drain in-situ.     Neuro  #Anxiety  - c/w home busbar 10mg daily, Lexapro 20mg daily     CV  #Pericardial effusion with tamponade s/p pericardiocentesis and pigtail drain  #HTN  #HLD  - plan for RHC/LHC with pericardiocentesis and pigtail drain. Send pleural fluid studies   - EKG  - hold home amlodipine 10mg daily and losartan 100mg daily   - repeat TTE ordered     Pulm  #Tobacco use disorder  :: smokes 1PPD  - nicotine patch    GI  #GERD  - c/w famotidine 40mg bid     Renal  #R hydronephrosis on CT   :: CT C/A/P 12/2024: New mild R hydronphrosis and hydroureter extending to the elvel of the  ureterovesicular junction without evidence of definite stone or mass. Radiation associated early fibrosis cannot be ruled out  - CTM, consider CT urogram     Endo  HUGH    ID  HUGH    Heme/Onc   #squamous cell carcinoma of the anus s/p chemoXRT   :: follows with Dr. Álvarez. anoscopy 3/18/25 with mild erythema and tail ectasias involving the L posterior anal canal, scar rissue resolved with no evidence of concerning anal or perianal skin lesions. MRI rectum w/wo 4/30/25 revealed posttreatment changes without residual measurable disease mass or LAD  :: CT C/A/P 12/2024: interval increase in moderate pericardial effusion with heterogeneous hepatic enhancement and body wall edema, c/f cardiac dysfunction. New mild R hydronphrosis and hydroureter extending to the elvel of the ureterovesicular junction without evidence of definite stone or mass. Radiation associated early fibrosis cannot be ruled out   - CTM     F: PRN  E: PRN  N: NPO  A: PIV   DVT ppx: SCDs   Code Status: Full Code  NOK: daughter Yvrose [phone]        Alem Betancourt MD         [1]   Family History  Problem Relation Name Age of Onset    Colon cancer Mother Regla Kotila     Melanoma Mother Regla Kotila     Cancer Mother Regla Kotila     Prostate cancer Father Lester Lermaa     Cancer Father Lester Fernández     Breast cancer Mother's Sister      Lung cancer Mother's Sister      Lung cancer Mother's Brother      Cancer Mother's Sister Sylvie Daniel     Cancer Mother's Sister Piper Louie     Cancer Mother's Brother Anatoly Cool

## 2025-05-03 ENCOUNTER — APPOINTMENT (OUTPATIENT)
Dept: CARDIOLOGY | Facility: HOSPITAL | Age: 70
End: 2025-05-03
Payer: MEDICARE

## 2025-05-03 LAB
ALBUMIN SERPL BCP-MCNC: 4.1 G/DL (ref 3.4–5)
ANION GAP SERPL CALC-SCNC: 10 MMOL/L (ref 10–20)
BASOPHILS # BLD AUTO: 0.04 X10*3/UL (ref 0–0.1)
BASOPHILS NFR BLD AUTO: 0.6 %
BASOPHILS NFR FLD MANUAL: 1 %
BLASTS NFR FLD MANUAL: 0 % (ref ?–0)
BUN SERPL-MCNC: 17 MG/DL (ref 6–23)
CALCIUM SERPL-MCNC: 9.1 MG/DL (ref 8.6–10.6)
CHLORIDE SERPL-SCNC: 107 MMOL/L (ref 98–107)
CLARITY FLD: ABNORMAL
CO2 SERPL-SCNC: 24 MMOL/L (ref 21–32)
COLOR FLD: ABNORMAL
CREAT SERPL-MCNC: 0.8 MG/DL (ref 0.5–1.05)
CRP SERPL-MCNC: 0.25 MG/DL
EGFRCR SERPLBLD CKD-EPI 2021: 81 ML/MIN/1.73M*2
EJECTION FRACTION: 63 %
EOSINOPHIL # BLD AUTO: 0.07 X10*3/UL (ref 0–0.7)
EOSINOPHIL NFR BLD AUTO: 1 %
EOSINOPHIL NFR FLD MANUAL: 0 %
ERYTHROCYTE [DISTWIDTH] IN BLOOD BY AUTOMATED COUNT: 15.2 % (ref 11.5–14.5)
ERYTHROCYTE [SEDIMENTATION RATE] IN BLOOD BY WESTERGREN METHOD: 11 MM/H (ref 0–30)
GLUCOSE FLD-MCNC: 84 MG/DL
GLUCOSE SERPL-MCNC: 106 MG/DL (ref 74–99)
HCT VFR BLD AUTO: 35.1 % (ref 36–46)
HGB BLD-MCNC: 12 G/DL (ref 12–16)
IMM GRANULOCYTES # BLD AUTO: 0.04 X10*3/UL (ref 0–0.7)
IMM GRANULOCYTES NFR BLD AUTO: 0.6 % (ref 0–0.9)
IMMATURE GRANULOCYTES IN FLUID: 0 %
LDH FLD L TO P-CCNC: 104 U/L
LEFT VENTRICLE INTERNAL DIMENSION DIASTOLE: 3.9 CM (ref 3.5–6)
LYMPHOCYTES # BLD AUTO: 0.59 X10*3/UL (ref 1.2–4.8)
LYMPHOCYTES NFR BLD AUTO: 8.2 %
LYMPHOCYTES NFR FLD MANUAL: 19 %
MAGNESIUM SERPL-MCNC: 1.9 MG/DL (ref 1.6–2.4)
MCH RBC QN AUTO: 32 PG (ref 26–34)
MCHC RBC AUTO-ENTMCNC: 34.2 G/DL (ref 32–36)
MCV RBC AUTO: 94 FL (ref 80–100)
MITRAL VALVE E/A RATIO: 1.02
MONOCYTES # BLD AUTO: 0.56 X10*3/UL (ref 0.1–1)
MONOCYTES NFR BLD AUTO: 7.8 %
MONOS+MACROS NFR FLD MANUAL: 60 %
NEUTROPHILS # BLD AUTO: 5.9 X10*3/UL (ref 1.2–7.7)
NEUTROPHILS NFR BLD AUTO: 81.8 %
NEUTROPHILS NFR FLD MANUAL: 20 %
NRBC BLD-RTO: 0 /100 WBCS (ref 0–0)
OTHER CELLS NFR FLD MANUAL: 0 % (ref ?–0)
PHOSPHATE SERPL-MCNC: 4.6 MG/DL (ref 2.5–4.9)
PLASMA CELLS NFR FLD MANUAL: 0 % (ref ?–0)
PLATELET # BLD AUTO: 206 X10*3/UL (ref 150–450)
POTASSIUM SERPL-SCNC: 4.1 MMOL/L (ref 3.5–5.3)
PROT FLD-MCNC: 4.7 G/DL
RBC # BLD AUTO: 3.75 X10*6/UL (ref 4–5.2)
RBC # FLD AUTO: ABNORMAL /UL
RIGHT VENTRICLE FREE WALL PEAK S': 16 CM/S
RIGHT VENTRICLE PEAK SYSTOLIC PRESSURE: 33.9 MMHG
SODIUM SERPL-SCNC: 137 MMOL/L (ref 136–145)
TOTAL CELLS COUNTED PCAR: 100
TRICUSPID ANNULAR PLANE SYSTOLIC EXCURSION: 1.6 CM
WBC # BLD AUTO: 7.2 X10*3/UL (ref 4.4–11.3)
WBC # FLD AUTO: 180 /UL

## 2025-05-03 PROCEDURE — 93321 DOPPLER ECHO F-UP/LMTD STD: CPT | Performed by: INTERNAL MEDICINE

## 2025-05-03 PROCEDURE — 2500000001 HC RX 250 WO HCPCS SELF ADMINISTERED DRUGS (ALT 637 FOR MEDICARE OP)

## 2025-05-03 PROCEDURE — 2500000004 HC RX 250 GENERAL PHARMACY W/ HCPCS (ALT 636 FOR OP/ED): Mod: JZ

## 2025-05-03 PROCEDURE — 99291 CRITICAL CARE FIRST HOUR: CPT

## 2025-05-03 PROCEDURE — 93325 DOPPLER ECHO COLOR FLOW MAPG: CPT

## 2025-05-03 PROCEDURE — 93308 TTE F-UP OR LMTD: CPT | Performed by: INTERNAL MEDICINE

## 2025-05-03 PROCEDURE — S4991 NICOTINE PATCH NONLEGEND: HCPCS

## 2025-05-03 PROCEDURE — 2500000002 HC RX 250 W HCPCS SELF ADMINISTERED DRUGS (ALT 637 FOR MEDICARE OP, ALT 636 FOR OP/ED)

## 2025-05-03 PROCEDURE — 2020000001 HC ICU ROOM DAILY

## 2025-05-03 PROCEDURE — 86140 C-REACTIVE PROTEIN: CPT

## 2025-05-03 PROCEDURE — 2500000005 HC RX 250 GENERAL PHARMACY W/O HCPCS

## 2025-05-03 PROCEDURE — 93325 DOPPLER ECHO COLOR FLOW MAPG: CPT | Performed by: INTERNAL MEDICINE

## 2025-05-03 PROCEDURE — 85652 RBC SED RATE AUTOMATED: CPT

## 2025-05-03 PROCEDURE — 83735 ASSAY OF MAGNESIUM: CPT

## 2025-05-03 PROCEDURE — 85025 COMPLETE CBC W/AUTO DIFF WBC: CPT

## 2025-05-03 PROCEDURE — 36415 COLL VENOUS BLD VENIPUNCTURE: CPT

## 2025-05-03 PROCEDURE — 80069 RENAL FUNCTION PANEL: CPT

## 2025-05-03 RX ORDER — OXYCODONE HYDROCHLORIDE 5 MG/1
2.5 TABLET ORAL ONCE
Refills: 0 | Status: COMPLETED | OUTPATIENT
Start: 2025-05-03 | End: 2025-05-03

## 2025-05-03 RX ORDER — COLCHICINE 0.6 MG/1
1.2 TABLET ORAL EVERY 12 HOURS
Status: COMPLETED | OUTPATIENT
Start: 2025-05-03 | End: 2025-05-03

## 2025-05-03 RX ORDER — POLYETHYLENE GLYCOL 3350 17 G/17G
17 POWDER, FOR SOLUTION ORAL DAILY
Status: DISCONTINUED | OUTPATIENT
Start: 2025-05-03 | End: 2025-05-06 | Stop reason: HOSPADM

## 2025-05-03 RX ORDER — MAGNESIUM SULFATE 1 G/100ML
1 INJECTION INTRAVENOUS ONCE
Status: COMPLETED | OUTPATIENT
Start: 2025-05-03 | End: 2025-05-03

## 2025-05-03 RX ORDER — HYDROMORPHONE HYDROCHLORIDE 0.2 MG/ML
0.2 INJECTION INTRAMUSCULAR; INTRAVENOUS; SUBCUTANEOUS EVERY 4 HOURS PRN
Status: DISCONTINUED | OUTPATIENT
Start: 2025-05-03 | End: 2025-05-04

## 2025-05-03 RX ORDER — ONDANSETRON 4 MG/1
4 TABLET, FILM COATED ORAL EVERY 8 HOURS PRN
Status: DISCONTINUED | OUTPATIENT
Start: 2025-05-03 | End: 2025-05-06 | Stop reason: HOSPADM

## 2025-05-03 RX ORDER — OXYCODONE HYDROCHLORIDE 5 MG/1
5 TABLET ORAL EVERY 6 HOURS PRN
Refills: 0 | Status: DISCONTINUED | OUTPATIENT
Start: 2025-05-03 | End: 2025-05-04

## 2025-05-03 RX ORDER — MAGNESIUM SULFATE HEPTAHYDRATE 40 MG/ML
2 INJECTION, SOLUTION INTRAVENOUS ONCE
Status: DISCONTINUED | OUTPATIENT
Start: 2025-05-03 | End: 2025-05-03

## 2025-05-03 RX ORDER — ONDANSETRON HYDROCHLORIDE 2 MG/ML
INJECTION, SOLUTION INTRAVENOUS
Status: COMPLETED
Start: 2025-05-03 | End: 2025-05-03

## 2025-05-03 RX ORDER — COLCHICINE 0.6 MG/1
0.6 TABLET ORAL EVERY 12 HOURS
Status: DISCONTINUED | OUTPATIENT
Start: 2025-05-04 | End: 2025-05-06 | Stop reason: HOSPADM

## 2025-05-03 RX ORDER — HYDROMORPHONE HYDROCHLORIDE 0.2 MG/ML
0.2 INJECTION INTRAMUSCULAR; INTRAVENOUS; SUBCUTANEOUS EVERY 4 HOURS PRN
Status: DISCONTINUED | OUTPATIENT
Start: 2025-05-03 | End: 2025-05-03

## 2025-05-03 RX ORDER — DIPHENHYDRAMINE HCL 25 MG
4 CAPSULE ORAL EVERY 2 HOUR PRN
Status: DISCONTINUED | OUTPATIENT
Start: 2025-05-03 | End: 2025-05-06 | Stop reason: HOSPADM

## 2025-05-03 RX ORDER — CETIRIZINE HYDROCHLORIDE 5 MG/1
5 TABLET ORAL DAILY PRN
COMMUNITY

## 2025-05-03 RX ORDER — CYCLOBENZAPRINE HCL 10 MG
5 TABLET ORAL 3 TIMES DAILY PRN
Status: DISCONTINUED | OUTPATIENT
Start: 2025-05-03 | End: 2025-05-06 | Stop reason: HOSPADM

## 2025-05-03 RX ORDER — LIDOCAINE 560 MG/1
1 PATCH PERCUTANEOUS; TOPICAL; TRANSDERMAL DAILY
Status: DISCONTINUED | OUTPATIENT
Start: 2025-05-03 | End: 2025-05-06 | Stop reason: HOSPADM

## 2025-05-03 RX ORDER — ONDANSETRON HYDROCHLORIDE 2 MG/ML
4 INJECTION, SOLUTION INTRAVENOUS EVERY 8 HOURS PRN
Status: DISCONTINUED | OUTPATIENT
Start: 2025-05-03 | End: 2025-05-06 | Stop reason: HOSPADM

## 2025-05-03 RX ADMIN — NICOTINE 1 PATCH: 21 PATCH, EXTENDED RELEASE TRANSDERMAL at 08:14

## 2025-05-03 RX ADMIN — ESCITALOPRAM OXALATE 20 MG: 20 TABLET ORAL at 08:11

## 2025-05-03 RX ADMIN — ACETAMINOPHEN 975 MG: 325 TABLET, FILM COATED ORAL at 03:02

## 2025-05-03 RX ADMIN — ACETAMINOPHEN 975 MG: 325 TABLET, FILM COATED ORAL at 11:16

## 2025-05-03 RX ADMIN — ATORVASTATIN CALCIUM 20 MG: 20 TABLET, FILM COATED ORAL at 06:27

## 2025-05-03 RX ADMIN — FAMOTIDINE 40 MG: 20 TABLET ORAL at 20:16

## 2025-05-03 RX ADMIN — COLCHICINE 1.2 MG: 0.6 TABLET, FILM COATED ORAL at 03:24

## 2025-05-03 RX ADMIN — HYDROMORPHONE HYDROCHLORIDE 0.2 MG: 0.2 INJECTION, SOLUTION INTRAMUSCULAR; INTRAVENOUS; SUBCUTANEOUS at 11:58

## 2025-05-03 RX ADMIN — CYCLOBENZAPRINE 5 MG: 10 TABLET, FILM COATED ORAL at 11:58

## 2025-05-03 RX ADMIN — ONDANSETRON HYDROCHLORIDE 4 MG: 2 INJECTION, SOLUTION INTRAVENOUS at 11:35

## 2025-05-03 RX ADMIN — LIDOCAINE 1 PATCH: 4 PATCH TOPICAL at 08:12

## 2025-05-03 RX ADMIN — MAGNESIUM SULFATE HEPTAHYDRATE 1 G: 1 INJECTION, SOLUTION INTRAVENOUS at 06:56

## 2025-05-03 RX ADMIN — ONDANSETRON 4 MG: 2 INJECTION INTRAMUSCULAR; INTRAVENOUS at 11:35

## 2025-05-03 RX ADMIN — OXYCODONE HYDROCHLORIDE 2.5 MG: 5 TABLET ORAL at 11:40

## 2025-05-03 RX ADMIN — BUSPIRONE HYDROCHLORIDE 10 MG: 10 TABLET ORAL at 06:27

## 2025-05-03 RX ADMIN — COLCHICINE 1.2 MG: 0.6 TABLET, FILM COATED ORAL at 16:57

## 2025-05-03 RX ADMIN — PERFLUTREN 10 ML OF DILUTION: 6.52 INJECTION, SUSPENSION INTRAVENOUS at 08:22

## 2025-05-03 RX ADMIN — CYCLOBENZAPRINE 5 MG: 10 TABLET, FILM COATED ORAL at 09:01

## 2025-05-03 RX ADMIN — FAMOTIDINE 40 MG: 20 TABLET ORAL at 08:12

## 2025-05-03 ASSESSMENT — PAIN - FUNCTIONAL ASSESSMENT
PAIN_FUNCTIONAL_ASSESSMENT: 0-10

## 2025-05-03 ASSESSMENT — PAIN SCALES - GENERAL
PAINLEVEL_OUTOF10: 6
PAINLEVEL_OUTOF10: 4
PAINLEVEL_OUTOF10: 10 - WORST POSSIBLE PAIN
PAINLEVEL_OUTOF10: 0 - NO PAIN
PAINLEVEL_OUTOF10: 0 - NO PAIN
PAINLEVEL_OUTOF10: 8
PAINLEVEL_OUTOF10: 0 - NO PAIN
PAINLEVEL_OUTOF10: 0 - NO PAIN
PAINLEVEL_OUTOF10: 10 - WORST POSSIBLE PAIN

## 2025-05-03 ASSESSMENT — PAIN DESCRIPTION - DESCRIPTORS
DESCRIPTORS: SPASM
DESCRIPTORS: PRESSURE;SHOOTING

## 2025-05-03 ASSESSMENT — PAIN DESCRIPTION - ORIENTATION: ORIENTATION: MID

## 2025-05-03 ASSESSMENT — PAIN DESCRIPTION - LOCATION
LOCATION: BACK
LOCATION: BACK
LOCATION: CHEST

## 2025-05-03 NOTE — PROGRESS NOTES
"Pharmacy Medication History Review    Raquel Palacios \"Mary\" is a 69 y.o. female admitted for Pericardial effusion ([facility]). Pharmacy reviewed the patient's [id number] medications and allergies for accuracy.    Medications ADDED:  Cetirizine   Medications CHANGED:  Acetaminophen 325 to 500 mg   Azelastine--  1 drop both eye as needed for allergies  Medications REMOVED:   allegra   Antivert    The list below reflects the updated PTA list.   Prior to Admission Medications   Prescriptions Last Dose Informant Patient Reported? Taking?   LORazepam (Ativan) 1 mg tablet  Self No No   Sig: Take 1 tablet (1 mg) by mouth every 6 hours if needed for anxiety (MRI) for up to 1 dose.   acetaminophen (Tylenol) 500 mg tablet  Self Yes No   Sig: Take 2 tablets (1,000 mg) by mouth every 6 hours if needed for mild pain (1 - 3).   amLODIPine (Norvasc) 10 mg tablet   Yes No   Sig: Take 1 tablet (10 mg) by mouth once daily.   atorvastatin (Lipitor) 20 mg tablet  Self Yes No   Sig: Take 1 tablet (20 mg) by mouth once daily.   azelastine (Optivar) 0.05 % ophthalmic solution  Self Yes No   Sig: Administer 1 drop into both eyes if needed (allergies).   busPIRone (Buspar) 10 mg tablet  Self Yes No   Sig: Take 1 tablet (10 mg) by mouth once daily.   cetirizine (ZyrTEC) 5 mg tablet  Self Yes No   Sig: Take 1 tablet (5 mg) by mouth once daily as needed for allergies.   ergocalciferol (Vitamin D-2) 1.25 MG (91649 UT) capsule  Self Yes No   Sig: Take 1 capsule (50,000 Units) by mouth once a week. sunday   escitalopram (Lexapro) 20 mg tablet  Self Yes No   Sig: Take 1 tablet (20 mg) by mouth once daily.   famotidine (Pepcid) 40 mg tablet   No No   Sig: Take 1 tablet (40 mg) by mouth 2 times a day.   Patient taking differently: Take 1 tablet (40 mg) by mouth 2 times a day as needed for heartburn.   hydrocortisone (Anusol-HC) 2.5 % rectal cream Not Taking  Yes No   Sig: Insert into the rectum twice a day. As needed   Patient not taking: " "Reported on 5/3/2025   loperamide (Imodium) 2 mg capsule Not Taking Self No No   Sig: Take 2 capsules (4 mg) by mouth with the first episode of diarrhea and 1 capsule (2 mg) by mouth with any additional episodes. Maximum 8 capsules (16 mg) per day.   Patient not taking: Reported on 5/3/2025   losartan (Cozaar) 100 mg tablet  Self Yes No   Sig: Take 1 tablet (100 mg) by mouth once daily.   ondansetron (Zofran) 8 mg tablet  Self No No   Sig: Take 1 tablet (8 mg) by mouth every 8 hours if needed for nausea or vomiting.   sucralfate (Carafate) 1 gram tablet   No No   Sig: Take 1 tablet (1 g) by mouth 2 times a day before meals.   Patient taking differently: Take 1 tablet (1 g) by mouth once daily.      Facility-Administered Medications: None        The list below reflects the updated allergy list. Please review each documented allergy for additional clarification and justification.  Allergies  Reviewed by Reema Jones PharmD on 5/3/2025        Severity Reactions Comments    Animal Dander High Itching, Shortness of breath     House Dust Mite High Itching     Sulfa (sulfonamide Antibiotics) High Anaphylaxis     Cat Dander Not Specified Itching     Codeine Not Specified Other Cant wake up            Patient declines M2B at discharge.     Sources:   [facility]  Pharmacy dispense history  Patient Interview --Moderate historian  Had a medication list at bedside table  Able to provide some history/med details  Chart Review  Oncology note from 4/16  Cardio note from 4/22  Care Everywhere    Additional Comments:  Please review additional comments above within the medication list      Reema Jones PharmD  Transitions of Care Pharmacist  05/03/25     Secure Chat preferred   If no response call [phone] or [employer] \"Med Rec\"      "

## 2025-05-03 NOTE — PROGRESS NOTES
"Raquel Palacios \"Lupe" is a 69 y.o. female on day 1 of admission presenting with Pericardial effusion (HHS-HCC).      Subjective   Overnight, minimal drainage from pericardial drain. Pt endorsing pleuritic CP when taking a deep breath, EKG NSR without ST changes. Started on colchicine.   This AM patient continues to endorse pleuritic CP that is improving. Main complaint is muscle spasms in back. Denies SOB, palpitations, LH, or other sx.     Hospital Course  Mary Palacios is a 69 y.o. 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 Álvarez, s/p chemoRT with capecitabine/mitomycin Aug-Sep 2024), HTN, HLD, tobacco use disorder, GERD, anxiety presenting as a direct admit for pericardiocentesis with plan for pigtail drain in-situ.      During recent surveillance CT for SCC, found to have a moderate pericardial effusion. Pt also endorsing dyspnea for the past several weeks. TTE 4/30 showed EF 50-55%, abnormal pattern of LV diastolic filling, normal RV systolic function, and a large pericardial effusion with early RV diastolic collapse. S/p pericardiocentesis with 500cc serosanguinous fluid removed; RHC/LHC 5/2 with mildly elevated filling pressures in the RA, with normal CO and CI. Non obstructive CAD in a right dominant system. Repeat TTE 5/3 with trivial to small pericardial effusion, EF 60-65%, normal RV global systolic function. Post-op patient developed pleuritic CP, started on colchicine with improvement. Pt drained 100cc pericardial fluid over past 24h. Maintain pigtail drain. Stable for transfer to floor.     To-Do  [ ] monitor pericardial pigtail drain output. When output <50cc/d, reach out to interventional cardiology for removal   [ ] FU pericardial fluid studies inc cytology   [ ] recommend colchicine for at least 1 mo. If c/f pericarditis, consider prolonging course to 3 mo.    [ ] resume home amlodipine 10mg daily and losartan 100mg daily as able   Objective     Last " Recorded Vitals  /64   Pulse 89   Temp 36.4 °C (97.5 °F) (Temporal)   Resp 26   Wt 79.3 kg (174 lb 13.2 oz)   SpO2 95%   Intake/Output last 3 Shifts:    Physical Exam  Physical Exam  General: well-developed in NAD  CV: rrr, no m/r/g, warm and dry, pericardial pigtail drainage to gravity without drainage   Pulm: CTAB  GI: soft, NT, ND  Extremities: trace LE edema        Assessment & Plan  Pericardial effusion (HHS-HCC)    Abnormal findings on diagnostic imaging of heart and coronary circulation    Mary Palacios is a 69 y.o. 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 Álvarez, s/p chemoRT with capecitabine/mitomycin Aug-Sep 2024), HTN, HLD, tobacco use disorder, GERD, anxiety incidentally found to have pericardial effusion on surveillance CT. TTE with large pericardial effusion with early RV diastolic collapse. S/p RHC/LHC and pericardiocentesis 5/2 with 500cc serosanguinous fluid removed, studies pending.     Updates:  - 100cc serosanguinous fluid from pericardial drain since yesterday. repeat TTE 5/3 with trivial to small pericardial effusion, EF 60-65%, normal RV global systolic function. Maintain pigtail drain, CTM output   - c/w colchicine 0.6mg q12h   - stable for transfer to floor    Neuro  #Anxiety  - c/w home busbar 10mg daily, Lexapro 20mg daily      CV  #Pericardial effusion with tamponade s/p pericardiocentesis and pigtail drain  #HTN  #HLD  :: RHC/LHC 5/2 Mildly elevated filling pressures in the RA, with normal CO and CI. Non obstructive CAD in a right dominant system   :: TTE 5/3 with trivial to small pericardial effusion, EF 60-65%, normal RV global systolic function   - s/p RHC/LHC with pericardiocentesis (500cc serosanginous fluid removed) and pigtail drain, pleural fluid studies pending   - 100cc serosanguinous fluid from pericardial drain since yesterday, maintain pigtail, CTM output   - c/w colchicine 0.6mg q12h  - hold home amlodipine 10mg daily and  losartan 100mg daily     Pulm  #Tobacco use disorder  :: smokes 1PPD  - nicotine patch     GI  #GERD  - c/w famotidine 40mg bid      Renal  #R hydronephrosis on CT   :: CT C/A/P 12/2024: New mild R hydronphrosis and hydroureter extending to the elvel of the ureterovesicular junction without evidence of definite stone or mass. Radiation associated early fibrosis cannot be ruled out  - CTM, consider CT urogram      Endo  HUGH     ID  HUGH    MSK  #Back spasms  - Tylenol 975mg TID PRN, flexeril 5mg TID PRN, lidocaine patch, oxy 5mg q6g PRN, IV dilaudid 0.2mg q4h PRN for breakthrough      Heme/Onc   #Squamous cell carcinoma of the anus s/p chemoXRT   :: follows with Dr. Álvarez. anoscopy 3/18/25 with mild erythema and tail ectasias involving the L posterior anal canal, scar rissue resolved with no evidence of concerning anal or perianal skin lesions. MRI rectum w/wo 4/30/25 revealed posttreatment changes without residual measurable disease mass or LAD  :: CT C/A/P 12/2024: interval increase in moderate pericardial effusion with heterogeneous hepatic enhancement and body wall edema, c/f cardiac dysfunction. New mild R hydronphrosis and hydroureter extending to the elvel of the ureterovesicular junction without evidence of definite stone or mass. Radiation associated early fibrosis cannot be ruled out   - FU outpt, pleural fluid cytology pending      F: PRN  E: PRN  N: regular  A: PIV   DVT ppx: SCDs   Code Status: Full Code  NOK: daughter Yvrose [phone]          Alem Betancourt MD

## 2025-05-04 LAB
ALBUMIN SERPL BCP-MCNC: 3.9 G/DL (ref 3.4–5)
ANION GAP SERPL CALC-SCNC: 13 MMOL/L (ref 10–20)
BASOPHILS # BLD AUTO: 0.01 X10*3/UL (ref 0–0.1)
BASOPHILS NFR BLD AUTO: 0.1 %
BUN SERPL-MCNC: 15 MG/DL (ref 6–23)
CALCIUM SERPL-MCNC: 8.8 MG/DL (ref 8.6–10.6)
CHLORIDE SERPL-SCNC: 106 MMOL/L (ref 98–107)
CO2 SERPL-SCNC: 24 MMOL/L (ref 21–32)
CREAT SERPL-MCNC: 0.96 MG/DL (ref 0.5–1.05)
EGFRCR SERPLBLD CKD-EPI 2021: 65 ML/MIN/1.73M*2
EOSINOPHIL # BLD AUTO: 0.01 X10*3/UL (ref 0–0.7)
EOSINOPHIL NFR BLD AUTO: 0.1 %
ERYTHROCYTE [DISTWIDTH] IN BLOOD BY AUTOMATED COUNT: 15 % (ref 11.5–14.5)
GLUCOSE SERPL-MCNC: 96 MG/DL (ref 74–99)
HCT VFR BLD AUTO: 35.9 % (ref 36–46)
HGB BLD-MCNC: 12.1 G/DL (ref 12–16)
IMM GRANULOCYTES # BLD AUTO: 0.02 X10*3/UL (ref 0–0.7)
IMM GRANULOCYTES NFR BLD AUTO: 0.3 % (ref 0–0.9)
LYMPHOCYTES # BLD AUTO: 0.91 X10*3/UL (ref 1.2–4.8)
LYMPHOCYTES NFR BLD AUTO: 12.1 %
MAGNESIUM SERPL-MCNC: 2.19 MG/DL (ref 1.6–2.4)
MCH RBC QN AUTO: 31.3 PG (ref 26–34)
MCHC RBC AUTO-ENTMCNC: 33.7 G/DL (ref 32–36)
MCV RBC AUTO: 93 FL (ref 80–100)
MONOCYTES # BLD AUTO: 0.92 X10*3/UL (ref 0.1–1)
MONOCYTES NFR BLD AUTO: 12.2 %
NEUTROPHILS # BLD AUTO: 5.68 X10*3/UL (ref 1.2–7.7)
NEUTROPHILS NFR BLD AUTO: 75.2 %
NRBC BLD-RTO: 0 /100 WBCS (ref 0–0)
PHOSPHATE SERPL-MCNC: 3.5 MG/DL (ref 2.5–4.9)
PLATELET # BLD AUTO: 190 X10*3/UL (ref 150–450)
POTASSIUM SERPL-SCNC: 4.2 MMOL/L (ref 3.5–5.3)
RBC # BLD AUTO: 3.87 X10*6/UL (ref 4–5.2)
SODIUM SERPL-SCNC: 139 MMOL/L (ref 136–145)
WBC # BLD AUTO: 7.6 X10*3/UL (ref 4.4–11.3)

## 2025-05-04 PROCEDURE — 2500000002 HC RX 250 W HCPCS SELF ADMINISTERED DRUGS (ALT 637 FOR MEDICARE OP, ALT 636 FOR OP/ED)

## 2025-05-04 PROCEDURE — 2500000004 HC RX 250 GENERAL PHARMACY W/ HCPCS (ALT 636 FOR OP/ED)

## 2025-05-04 PROCEDURE — 2500000001 HC RX 250 WO HCPCS SELF ADMINISTERED DRUGS (ALT 637 FOR MEDICARE OP)

## 2025-05-04 PROCEDURE — 83735 ASSAY OF MAGNESIUM: CPT

## 2025-05-04 PROCEDURE — 80069 RENAL FUNCTION PANEL: CPT

## 2025-05-04 PROCEDURE — S4991 NICOTINE PATCH NONLEGEND: HCPCS

## 2025-05-04 PROCEDURE — 36415 COLL VENOUS BLD VENIPUNCTURE: CPT

## 2025-05-04 PROCEDURE — 1200000002 HC GENERAL ROOM WITH TELEMETRY DAILY

## 2025-05-04 PROCEDURE — 84100 ASSAY OF PHOSPHORUS: CPT

## 2025-05-04 PROCEDURE — 85025 COMPLETE CBC W/AUTO DIFF WBC: CPT

## 2025-05-04 RX ADMIN — ACETAMINOPHEN 975 MG: 325 TABLET, FILM COATED ORAL at 20:02

## 2025-05-04 RX ADMIN — POLYETHYLENE GLYCOL 3350 17 G: 17 POWDER, FOR SOLUTION ORAL at 08:12

## 2025-05-04 RX ADMIN — ERGOCALCIFEROL 1.25 MG: 1.25 CAPSULE ORAL at 12:51

## 2025-05-04 RX ADMIN — NICOTINE 1 PATCH: 21 PATCH, EXTENDED RELEASE TRANSDERMAL at 08:11

## 2025-05-04 RX ADMIN — ESCITALOPRAM OXALATE 20 MG: 20 TABLET ORAL at 08:11

## 2025-05-04 RX ADMIN — COLCHICINE 0.6 MG: 0.6 TABLET ORAL at 20:02

## 2025-05-04 RX ADMIN — BUSPIRONE HYDROCHLORIDE 10 MG: 10 TABLET ORAL at 06:39

## 2025-05-04 RX ADMIN — ATORVASTATIN CALCIUM 20 MG: 20 TABLET, FILM COATED ORAL at 06:39

## 2025-05-04 RX ADMIN — ONDANSETRON HYDROCHLORIDE 4 MG: 4 TABLET, FILM COATED ORAL at 17:11

## 2025-05-04 RX ADMIN — FAMOTIDINE 40 MG: 20 TABLET ORAL at 08:12

## 2025-05-04 RX ADMIN — COLCHICINE 0.6 MG: 0.6 TABLET ORAL at 06:39

## 2025-05-04 RX ADMIN — ACETAMINOPHEN 975 MG: 325 TABLET, FILM COATED ORAL at 01:14

## 2025-05-04 RX ADMIN — CYCLOBENZAPRINE 5 MG: 10 TABLET, FILM COATED ORAL at 20:02

## 2025-05-04 ASSESSMENT — PAIN - FUNCTIONAL ASSESSMENT
PAIN_FUNCTIONAL_ASSESSMENT: 0-10

## 2025-05-04 ASSESSMENT — COGNITIVE AND FUNCTIONAL STATUS - GENERAL
DAILY ACTIVITIY SCORE: 24
MOBILITY SCORE: 24
DAILY ACTIVITIY SCORE: 24
MOBILITY SCORE: 24

## 2025-05-04 ASSESSMENT — PAIN SCALES - GENERAL
PAINLEVEL_OUTOF10: 0 - NO PAIN
PAINLEVEL_OUTOF10: 2
PAINLEVEL_OUTOF10: 0 - NO PAIN
PAINLEVEL_OUTOF10: 0 - NO PAIN
PAINLEVEL_OUTOF10: 6

## 2025-05-04 ASSESSMENT — PAIN DESCRIPTION - LOCATION: LOCATION: HEAD

## 2025-05-04 NOTE — PROGRESS NOTES
"Raquel Alvarez \"Mary\" is a 69 y.o. female on day 2 of admission presenting with Pericardial effusion ([facility]).      Subjective   Patient seen today. Doing well with no new complaints. Tolerating orally and passing BM.       Objective     Last Recorded Vitals  /64   Pulse 97   Temp 36 °C (96.8 °F) (Temporal)   Resp (!) 30   Wt 76.8 kg (169 lb 5 oz)   SpO2 97%   Intake/Output last 3 Shifts:    Intake/Output Summary (Last 24 hours) at 5/4/2025 1520  Last data filed at 5/4/2025 0800  Gross per 24 hour   Intake 600 ml   Output 425 ml   Net 175 ml       Admission Weight  Weight: 79.3 kg (174 lb 13.2 oz) (05/03/25 0000)    Daily Weight  05/04/25 : 76.8 kg (169 lb 5 oz)    Image Results  Cardiac Catheterization Procedure     [facility], Cath Lab, [address] [place] [address]    Cardiovascular Catheterization Report    Patient Name:      RAQUEL ALVAREZ   Performing Physician: [id number] Spencer MD  Study Date:        5/2/2025             Verifying Physician:  [id number] Spencer MD  MRN/PID:           [id number]  Accession#:        [id number]         Ordering Provider:    [id number] ZEINAB SPENCER  Date of Birth/Age: 1955 / 69 years Cardiology Provider:  [id number] Iliana Morillo CNP  Gender:            F                    Fellow:               [id number] Neal Escobar MD  Admit Date:        5/2/2025             Fellow:               [id number] Zeinab Mccormick MD  Encounter#:        [id number]           Primary Physician:    [id number] Jeremias Rubin                                                  " Patient oriented to room and ED throughput process.  Safety measures with ED bed locked in lowest position and call light in reach.  Patient educated on all orders, including any medications.  Patient educated on chief complaint/symptoms. Patient encouraged to ask questions regarding care, medications or treatment plan.  Patient aware of how to reach staff with questions/concerns.                  DO       Study:            Left Heart Cath  Additional Study: Right Heart Cath  Additional Study: pericardiocentesis       Indications:  ANA LILIA ALVAREZ is a 70 year old female who presents with dyslipidemia, chronic pulmonary disease and hypertension. ANA LILIA ALVAREZ is a 70 year old female who presents with chronic pulmonary disease and an anginal equivalent chest pain assessment (i.e. dyspnea on exertion believed to be from ischemia).     Procedure Description:  After infiltration with 1% Lidocaine, the right radial artery was cannulated with a modified Seldinger technique. Subsequently a 6 Tajik sheath was placed retrograde in the right radial artery. Multiple injections of contrast were made into the left and right coronary arteries with angiograms recorded in multiple projections. A balloon tipped catheter was advanced through the right heart to record pressures. Cardiac output was calculated via the Delphine method. After completion of the procedure, the arterial sheath was pulled and a TR Band Radial Compression Device was utilized to obtain patent hemostasis.     Pericardiocentesis:  The area was prepped and draped and infiltrated with local anesthetic and over a floppy wire, the needle was replaced with a pigtail catheter. The catheter was left in the pericardial space and connected to a sterile bag system for drainage. The catheter was sutured in place and a dressing was applied.     Coronary Angiography:  The coronary circulation is right dominant.     Left Main Coronary Artery:  The left main coronary artery is a normal caliber vessel. The left main arises normally from the left coronary sinus of Valsalva and bifurcates into the LAD and circumflex coronary arteries. The left main coronary artery showed no significant disease or stenosis greater than 30%.     Left Anterior Descending Coronary Artery Distribution:  The left anterior descending coronary artery is a normal caliber vessel.  The LAD arises normally from the left main coronary artery. The LAD demonstrated mild proximal atherosclerotic disease.     Circumflex Coronary Artery Distribution:  The circumflex coronary artery is a normal caliber vessel. The circumflex arises normally from the left main coronary artery and terminates in the AV groove. The circumflex revealed no significant disease or stenosis greater than 30%. The 1st obtuse marginal branch showed no significant disease or stenosis greater than 30%.     Ramus Intermedius:  The ramus intermedius arises normally from the left main coronary artery. The ramus intermedius showed no significant disease or stenosis greater than 30%.     Right Heart Catheterization:  A balloon tipped catheter was advanced through the right heart to record pressures. Cardiac output was calculated via the Delphine method. Elevated left sided filling pressures with normal cardiac output. Cardiac output is normal. RA 14/11  RV 46/5  PA 41/15, mean 28mmHg  PW mean 11mmHg  CO 5.59/ CI 3.05.     Right Coronary Artery Distribution:    The right coronary artery is a normal caliber vessel. The RCA arises normally from the right sinus of Valsalva. The RCA showed no significant disease or stenosis greater than 30%.  The right posterolateral branch showed no significant disease or stenosis greater than 30%. The right posterior descending artery showed no significant disease or stenosis greater than 30%.     Coronary Lesion Summary:  Vessel          Stenosis  Left Main    <10% stenosis  LAD           30% stenosis  Circumflex 10 to 30% stenosis  OM 1         <10% stenosis  Ramus        <10% stenosis  RCA          <10% stenosis  RPL        10 to 30% stenosis  RPDA       10 to 30% stenosis       Cardiac Cath Post Procedure Notes:  Post Procedure Diagnosis: Non obstructive CAD in a right dominant system                            Elevated filling pressures with preserved CO and CI                            Early tamponade  physiology, following                            pericardiocentesis, pressures in the pericardial space                            was negative with resolution of tamponade physiology.  Blood Loss:               Estimated blood loss during the procedure was 10 mls.  Specimens Removed:        Number of specimen(s) removed: none.       Recommendations:  Transfer the patient to CICU.  Start Colchicine 0.6 mg daily.  Remove the drain once daily drainage falls below 25-50 ml.  Follow recommendations per CICU team/Dr Gillombardo.    ____________________________________________________________________________________  CONCLUSIONS:   1. Large pericardial effusion with early tamponade physiology. Pericardiocentesis performed, draining 500 ml of bloodstained fluid. Pigtail drain left sutured in-situ.   2. Non obstructive coronary atherosclerosis in a right dominant system.   3. Mildly elevated Right heart filling pressures with preserved CO and CI.    ICD 10 Codes:  Other pericardial effusion (noninflammatory)-I31.39; Shortness of breath-R06.02     CPT Codes:  Left Heart Cath (visualization of coronaries) and LV-04976; Right Heart Cath O2/Cardiac output without biopsy (RHC)-15280; Pericardiocentesis, including image guidance when performed-71712; Ultrasound guidance for needle placement-94162     [id number] Zeinab Spencer MD  Performing Physician  Electronically signed by [id number] Zeinab Spencer MD on 5/3/2025 at 11:43:36  PM         ** Final **  Transthoracic Echo (TTE) Limited     [facility], [address] [place] [address]                 Tel [phone] and Fax [phone]    TRANSTHORACIC ECHOCARDIOGRAM REPORT       Patient Name:       ANA LILIA Schrader Physician:    [id number] John Pugh MD  Study Date:         5/3/2025            Ordering Provider:    [id number] KAMI GARZA                                                                 GILLOMBARDMERCED  MRN/PID:            [id number]            Fellow:  Accession#:         [id number]        Nurse:  Date of Birth/Age:  1955 / 69      Sonographer:          Yayo thompson RDCS  Gender assigned at  F                   Additional Staff:  Birth:  Height:             167.64 cm           Admit Date:  Weight:             78.93 kg            Admission Status:     Inpatient -                                                                Routine  BSA / BMI:          1.89 m2 / 28.08     Encounter#:           [id number]                      kg/m2  Blood Pressure:     125/64 mmHg         Department Location:  [facility]    Study Type:    TRANSTHORACIC ECHO (TTE) LIMITED  Diagnosis/ICD: Other pericardial effusion (noninflammatory)-I31.39  Indication:    pericardial effusion s/p pericardiocentesis  CPT Code:      Echo Limited-72446; Doppler Limited-52150; Color Doppler-46298    Patient History:  Pertinent History: HTN, pericardial effusion c/b tamponade s/p                     pericardiocentesis 5/2/25.    Study Detail: The following Echo studies were performed: 2D, M-Mode, Doppler and                color flow. Technically challenging study due to drain in place in                apical window, prominent lung artifact, patient lying in supine                position and body habitus. Definity used as a contrast agent for                endocardial border definition. Total contrast used for this                procedure was 2 mL via IV push.       PHYSICIAN INTERPRETATION:  Left Ventricle: Left ventricular ejection fraction is normal, by visual estimate at 60-65%. There are no regional left ventricular wall motion abnormalities. The left ventricular cavity size is normal. There is normal septal and normal posterior left ventricular wall thickness. Spectral Doppler shows a normal pattern of left ventricular diastolic filling.  Left  Atrium: The left atrial size is normal.  Right Ventricle: The right ventricle is normal in size. There is normal right ventricular global systolic function.  Right Atrium: The right atrium is normal in size.  Aortic Valve: The aortic valve is trileaflet. There is no evidence of aortic valve regurgitation.  Mitral Valve: The mitral valve is normal in structure. There is no evidence of mitral valve regurgitation.  Tricuspid Valve: The tricuspid valve is structurally normal. There is trace tricuspid regurgitation.  Pulmonic Valve: The pulmonic valve is not well visualized. Pulmonic valve regurgitation was not assessed.  Pericardium: Trivial to small pericardial effusion.  Aorta: The aortic root is normal.  Pulmonary Artery: The tricuspid regurgitant velocity is 2.78 m/s, and with an estimated right atrial pressure of 3 mmHg, the estimated pulmonary artery pressure is borderline elevated with the RVSP at 33.9 mmHg.  Systemic Veins: The inferior vena cava appears normal in size, with IVC inspiratory collapse greater than 50%.  In comparison to the previous echocardiogram(s): Compared with study dated 5/2/2025, Interval drainage of large pericardial effusion. Small-trivial residual pocket lateral to the LV.       CONCLUSIONS:   1. Left ventricular ejection fraction is normal, by visual estimate at 60-65%.   2. There is normal right ventricular global systolic function.    QUANTITATIVE DATA SUMMARY:     2D MEASUREMENTS:         Normal Ranges:  IVSd:            0.80 cm (0.6-1.1cm)  LVPWd:           0.70 cm (0.6-1.1cm)  LVIDd:           3.90 cm (3.9-5.9cm)  LVIDs:           2.50 cm  LV Mass Index:   44 g/m2  LV % FS          35.9 %       LV SYSTOLIC FUNCTION:                       Normal Ranges:  EF-Visual:      63 %  LV EF Reported: 63 %       LV DIASTOLIC FUNCTION:           Normal Ranges:  MV Peak E:             0.79 m/s  (0.7-1.2 m/s)  MV Peak A:             0.78 m/s  (0.42-0.7 m/s)  E/A Ratio:             1.02       (1.0-2.2)  MV e'                  0.100 m/s (>8.0)  MV lateral e'          0.13 m/s  MV medial e'           0.07 m/s  E/e' Ratio:            7.93      (<8.0)       MITRAL VALVE:          Normal Ranges:  MV DT:        232 msec (150-240msec)       RIGHT VENTRICLE:  TAPSE: 15.7 mm  RV s'  0.16 m/s       TRICUSPID VALVE/RVSP:          Normal Ranges:  Peak TR Velocity:     2.78 m/s  Est. RA Pressure:     3 mmHg  RV Syst Pressure:     34 mmHg  (< 30mmHg)       [id number] John Pugh MD  Electronically signed on 5/3/2025 at 8:41:17 AM       ** Final **      Physical Exam  Constitutional:       Appearance: Normal appearance.   Cardiovascular:      Rate and Rhythm: Normal rate and regular rhythm.      Pulses: Normal pulses.      Heart sounds: Normal heart sounds.   Pulmonary:      Effort: Pulmonary effort is normal.      Breath sounds: Normal breath sounds.   Abdominal:      General: Abdomen is flat. There is no distension.      Palpations: Abdomen is soft.   Musculoskeletal:      Right lower leg: No edema.      Left lower leg: No edema.      Comments: Drain seen in place with 100-150ml of red serosanguinous fluid   Neurological:      Mental Status: She is alert and oriented to person, place, and time.      Sensory: No sensory deficit.      Motor: No weakness.             Assessment & Plan    Mary Palacios is a 69 y.o. 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 Álvarez, s/p chemoRT with capecitabine/mitomycin Aug-Sep 2024), HTN, HLD, tobacco use disorder, GERD, anxiety incidentally found to have pericardial effusion on surveillance CT. TTE with large pericardial effusion with early RV diastolic collapse. S/p RHC/LHC and pericardiocentesis 5/2 with 500cc serosanguinous fluid removed,       Updates 5/4:  - 100cc serosanguinous fluid from pericardial drain since yesterday. repeat TTE 5/3 with trivial to small pericardial effusion, EF 60-65%, normal RV global systolic function. Maintain  pigtail drain, CTM output       #Pericardial effusion with tamponade s/p pericardiocentesis and pigtail drain  #HTN  #HLD  :: RHC/LHC 5/2 Mildly elevated filling pressures in the RA, with normal CO and CI. Non obstructive CAD in a right dominant system   :: TTE 5/3 with trivial to small pericardial effusion, EF 60-65%, normal RV global systolic function   - s/p RHC/LHC with pericardiocentesis (500cc serosanginous fluid removed) and pigtail drain, pleural fluid studies pending   - 100cc serosanguinous fluid from pericardial drain since yesterday, maintain pigtail, CTM output   - c/w colchicine 0.6mg q12h  - hold home amlodipine 10mg daily and losartan 100mg daily         #Squamous cell carcinoma of the anus s/p chemoXRT   :: follows with Dr. Álvarez. anoscopy 3/18/25 with mild erythema and tail ectasias involving the L posterior anal canal, scar rissue resolved with no evidence of concerning anal or perianal skin lesions. MRI rectum w/wo 4/30/25 revealed posttreatment changes without residual measurable disease mass or LAD  :: CT C/A/P 12/2024: interval increase in moderate pericardial effusion with heterogeneous hepatic enhancement and body wall edema, c/f cardiac dysfunction. New mild R hydronphrosis and hydroureter extending to the elvel of the ureterovesicular junction without evidence of definite stone or mass. Radiation associated early fibrosis cannot be ruled out   - FU outpt, pleural fluid cytology pending        #R hydronephrosis on CT   :: CT C/A/P 12/2024: New mild R hydronphrosis and hydroureter extending to the elvel of the ureterovesicular junction without evidence of definite stone or mass. Radiation associated early fibrosis cannot be ruled out  - CTM, consider CT urogram       #GERD  - c/w famotidine 40mg bid    #Anxiety  - c/w home busbar 10mg daily, Lexapro 20mg daily       F: PRN  E: PRN  N: regular  A: PIV   DVT ppx: SCDs   Code Status: Full Code  NOK: daughter Yvrose [phone]        Elian Rojo MD

## 2025-05-04 NOTE — HOSPITAL COURSE
Mary Palacios is a 69 y.o. 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 Álvarez, s/p chemoRT with capecitabine/mitomycin Aug-Sep 2024), HTN, HLD, tobacco use disorder, GERD, anxiety presenting as a direct admit for pericardiocentesis with plan for pigtail drain in-situ.      During recent surveillance CT for SCC, found to have a moderate pericardial effusion. Pt also endorsing dyspnea for the past several weeks. TTE 4/30 showed EF 50-55%, abnormal pattern of LV diastolic filling, normal RV systolic function, and a large pericardial effusion with early RV diastolic collapse. S/p pericardiocentesis with 500cc serosanguinous fluid removed; RHC/LHC 5/2 with mildly elevated filling pressures in the RA, with normal CO and CI. Non obstructive CAD in a right dominant system. Repeat TTE 5/3 with trivial to small pericardial effusion, EF 60-65%, normal RV global systolic function. Post-op patient developed pleuritic CP, started on colchicine with improvement. Pt drained 100cc and then 60ml pericardial fluid over 24h intervals. After discussion with interventional cardiology team, noticed drain was clogged and not draining on 5/5 so TTE ordered to assess for pericardial effusion prior to pulling out drain. TTE showed trivial pericardial effusion so the drain was removed. Patient was then monitored overnight and the next morning she stated that she felt well. Denied any pain with inspiration and stated she would like to go home. Patient discharged with colchicine for 1 month and cardiology outpatient referral.

## 2025-05-04 NOTE — PROGRESS NOTES
"Raquel Palacios \"Lupe" is a 69 y.o. female on day 2 of admission presenting with Pericardial effusion ([facility]).      Subjective   Pt sitting up in bed comfortably, eating breakfast. states that she is feeling much better today. Back pain improved with flexeril. Continues to have pleuritic CP but improved from prior. LH/dizziness improved as well. Denies SOB or swelling. States that she has been ambulating around room without sx.      Objective     Last Recorded Vitals  /69   Pulse 93   Temp 35.8 °C (96.4 °F) (Temporal)   Resp 21   Wt 76.8 kg (169 lb 5 oz)   SpO2 98%   Intake/Output last 3 Shifts:    Physical Exam  Physical Exam  General: well-developed in NAD  CV: rrr, no m/r/g, warm and dry, pericardial pigtail drainage with ~50cc serosanguinous fluid   Pulm: CTAB  GI: soft, NT, ND  Extremities: trace LE edema        Assessment & Plan  Pericardial effusion ([facility]-HCC)    Abnormal findings on diagnostic imaging of heart and coronary circulation    Mary Palacios is a 69 y.o. 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 Álvarez, s/p chemoRT with capecitabine/mitomycin Aug-Sep 2024), HTN, HLD, tobacco use disorder, GERD, anxiety incidentally found to have pericardial effusion on surveillance CT. TTE with large pericardial effusion with early RV diastolic collapse. S/p RHC/LHC and pericardiocentesis 5/2 with 500cc serosanguinous fluid removed, studies pending.     Updates:  - 50cc serosanguinous fluid from pericardial drain since yesterday. repeat TTE 5/3 with trivial to small pericardial effusion, EF 60-65%, normal RV global systolic function. Maintain pigtail drain, CTM output   - c/w colchicine 0.6mg q12h     Neuro  #Anxiety  - c/w home busbar 10mg daily, Lexapro 20mg daily      CV  #Pericardial effusion with tamponade s/p pericardiocentesis and pigtail drain  #HTN  #HLD  :: RHC/LHC 5/2 Mildly elevated filling pressures in the RA, with normal CO and CI. Non obstructive " CAD in a right dominant system   :: TTE 5/3 with trivial to small pericardial effusion, EF 60-65%, normal RV global systolic function   - s/p RHC/LHC with pericardiocentesis (500cc serosanginous fluid removed) and pigtail drain, pleural fluid studies pending   - 50cc serosanguinous fluid from pericardial drain since yesterday, maintain pigtail   - c/w colchicine 0.6mg q12h  - hold home amlodipine 10mg daily and losartan 100mg daily     Pulm  #Tobacco use disorder  :: smokes 1PPD  - nicotine patch     GI  #GERD  - c/w famotidine 40mg bid      Renal  #R hydronephrosis on CT   :: CT C/A/P 12/2024: New mild R hydronphrosis and hydroureter extending to the elvel of the ureterovesicular junction without evidence of definite stone or mass. Radiation associated early fibrosis cannot be ruled out  - CTM, consider CT urogram      Endo  HUGH     ID  HUGH    MSK  #Back spasms  - Tylenol 975mg TID PRN, flexeril 5mg TID PRN, lidocaine patch, oxy 5mg q6g PRN, IV dilaudid 0.2mg q4h PRN for breakthrough      Heme/Onc   #Squamous cell carcinoma of the anus s/p chemoXRT   :: follows with Dr. Álvarez. anoscopy 3/18/25 with mild erythema and tail ectasias involving the L posterior anal canal, scar rissue resolved with no evidence of concerning anal or perianal skin lesions. MRI rectum w/wo 4/30/25 revealed posttreatment changes without residual measurable disease mass or LAD  :: CT C/A/P 12/2024: interval increase in moderate pericardial effusion with heterogeneous hepatic enhancement and body wall edema, c/f cardiac dysfunction. New mild R hydronphrosis and hydroureter extending to the elvel of the ureterovesicular junction without evidence of definite stone or mass. Radiation associated early fibrosis cannot be ruled out   - FU outpt, pleural fluid cytology pending      F: PRN  E: PRN  N: regular  A: PIV   DVT ppx: SCDs   Code Status: Full Code  NOK: daughter Yvrose [phone]            Alem Betancourt MD

## 2025-05-05 ENCOUNTER — APPOINTMENT (OUTPATIENT)
Dept: CARDIOLOGY | Facility: HOSPITAL | Age: 70
DRG: 287 | End: 2025-05-05
Payer: MEDICARE

## 2025-05-05 LAB
ALBUMIN SERPL BCP-MCNC: 3.7 G/DL (ref 3.4–5)
ANION GAP SERPL CALC-SCNC: 13 MMOL/L (ref 10–20)
ATRIAL RATE: 77 BPM
BACTERIA FLD CULT: NORMAL
BASOPHILS # BLD AUTO: 0.01 X10*3/UL (ref 0–0.1)
BASOPHILS NFR BLD AUTO: 0.2 %
BUN SERPL-MCNC: 16 MG/DL (ref 6–23)
CALCIUM SERPL-MCNC: 9.2 MG/DL (ref 8.6–10.6)
CHLORIDE SERPL-SCNC: 106 MMOL/L (ref 98–107)
CO2 SERPL-SCNC: 24 MMOL/L (ref 21–32)
CREAT SERPL-MCNC: 0.8 MG/DL (ref 0.5–1.05)
EGFRCR SERPLBLD CKD-EPI 2021: 81 ML/MIN/1.73M*2
EJECTION FRACTION: 68 %
EOSINOPHIL # BLD AUTO: 0.05 X10*3/UL (ref 0–0.7)
EOSINOPHIL NFR BLD AUTO: 1.1 %
ERYTHROCYTE [DISTWIDTH] IN BLOOD BY AUTOMATED COUNT: 14.9 % (ref 11.5–14.5)
GLUCOSE SERPL-MCNC: 84 MG/DL (ref 74–99)
GRAM STN SPEC: NORMAL
GRAM STN SPEC: NORMAL
HCT VFR BLD AUTO: 38.2 % (ref 36–46)
HGB BLD-MCNC: 12.5 G/DL (ref 12–16)
IMM GRANULOCYTES # BLD AUTO: 0.02 X10*3/UL (ref 0–0.7)
IMM GRANULOCYTES NFR BLD AUTO: 0.4 % (ref 0–0.9)
LEFT VENTRICLE INTERNAL DIMENSION DIASTOLE: 4 CM (ref 3.5–6)
LYMPHOCYTES # BLD AUTO: 0.42 X10*3/UL (ref 1.2–4.8)
LYMPHOCYTES NFR BLD AUTO: 9.3 %
MAGNESIUM SERPL-MCNC: 2.05 MG/DL (ref 1.6–2.4)
MCH RBC QN AUTO: 31.4 PG (ref 26–34)
MCHC RBC AUTO-ENTMCNC: 32.7 G/DL (ref 32–36)
MCV RBC AUTO: 96 FL (ref 80–100)
MONOCYTES # BLD AUTO: 0.61 X10*3/UL (ref 0.1–1)
MONOCYTES NFR BLD AUTO: 13.5 %
NEUTROPHILS # BLD AUTO: 3.42 X10*3/UL (ref 1.2–7.7)
NEUTROPHILS NFR BLD AUTO: 75.5 %
NRBC BLD-RTO: 0 /100 WBCS (ref 0–0)
P AXIS: 56 DEGREES
P OFFSET: 195 MS
P ONSET: 154 MS
PHOSPHATE SERPL-MCNC: 3.7 MG/DL (ref 2.5–4.9)
PLATELET # BLD AUTO: 214 X10*3/UL (ref 150–450)
POTASSIUM SERPL-SCNC: 3.9 MMOL/L (ref 3.5–5.3)
PR INTERVAL: 140 MS
Q ONSET: 224 MS
QRS COUNT: 12 BEATS
QRS DURATION: 64 MS
QT INTERVAL: 394 MS
QTC CALCULATION(BAZETT): 445 MS
QTC FREDERICIA: 428 MS
R AXIS: 31 DEGREES
RBC # BLD AUTO: 3.98 X10*6/UL (ref 4–5.2)
SODIUM SERPL-SCNC: 139 MMOL/L (ref 136–145)
T AXIS: 57 DEGREES
T OFFSET: 421 MS
VENTRICULAR RATE: 77 BPM
WBC # BLD AUTO: 4.5 X10*3/UL (ref 4.4–11.3)

## 2025-05-05 PROCEDURE — 80069 RENAL FUNCTION PANEL: CPT

## 2025-05-05 PROCEDURE — S4991 NICOTINE PATCH NONLEGEND: HCPCS

## 2025-05-05 PROCEDURE — 2500000001 HC RX 250 WO HCPCS SELF ADMINISTERED DRUGS (ALT 637 FOR MEDICARE OP)

## 2025-05-05 PROCEDURE — 93308 TTE F-UP OR LMTD: CPT | Performed by: INTERNAL MEDICINE

## 2025-05-05 PROCEDURE — 85025 COMPLETE CBC W/AUTO DIFF WBC: CPT

## 2025-05-05 PROCEDURE — 93325 DOPPLER ECHO COLOR FLOW MAPG: CPT | Performed by: INTERNAL MEDICINE

## 2025-05-05 PROCEDURE — 99233 SBSQ HOSP IP/OBS HIGH 50: CPT

## 2025-05-05 PROCEDURE — RXMED WILLOW AMBULATORY MEDICATION CHARGE

## 2025-05-05 PROCEDURE — 2500000002 HC RX 250 W HCPCS SELF ADMINISTERED DRUGS (ALT 637 FOR MEDICARE OP, ALT 636 FOR OP/ED)

## 2025-05-05 PROCEDURE — 2500000004 HC RX 250 GENERAL PHARMACY W/ HCPCS (ALT 636 FOR OP/ED)

## 2025-05-05 PROCEDURE — 99232 SBSQ HOSP IP/OBS MODERATE 35: CPT | Performed by: NURSE PRACTITIONER

## 2025-05-05 PROCEDURE — 93321 DOPPLER ECHO F-UP/LMTD STD: CPT | Performed by: INTERNAL MEDICINE

## 2025-05-05 PROCEDURE — 83735 ASSAY OF MAGNESIUM: CPT

## 2025-05-05 PROCEDURE — 93325 DOPPLER ECHO COLOR FLOW MAPG: CPT

## 2025-05-05 PROCEDURE — 1200000002 HC GENERAL ROOM WITH TELEMETRY DAILY

## 2025-05-05 PROCEDURE — 36415 COLL VENOUS BLD VENIPUNCTURE: CPT

## 2025-05-05 RX ORDER — NICOTINE 7MG/24HR
1 PATCH, TRANSDERMAL 24 HOURS TRANSDERMAL DAILY
Qty: 14 PATCH | Refills: 0 | Status: SHIPPED | OUTPATIENT
Start: 2025-06-27 | End: 2025-05-06 | Stop reason: HOSPADM

## 2025-05-05 RX ORDER — COLCHICINE 0.6 MG/1
0.6 TABLET ORAL EVERY 12 HOURS
Qty: 60 TABLET | Refills: 0 | Status: SHIPPED | OUTPATIENT
Start: 2025-05-05 | End: 2025-05-12 | Stop reason: SDUPTHER

## 2025-05-05 RX ORDER — IBUPROFEN 200 MG
1 TABLET ORAL DAILY
Qty: 14 PATCH | Refills: 0 | Status: SHIPPED | OUTPATIENT
Start: 2025-06-13 | End: 2025-05-06 | Stop reason: HOSPADM

## 2025-05-05 RX ORDER — IBUPROFEN 200 MG
1 TABLET ORAL DAILY
Qty: 28 PATCH | Refills: 1 | Status: SHIPPED | OUTPATIENT
Start: 2025-05-06 | End: 2025-05-06 | Stop reason: HOSPADM

## 2025-05-05 RX ADMIN — NICOTINE 1 PATCH: 21 PATCH, EXTENDED RELEASE TRANSDERMAL at 08:54

## 2025-05-05 RX ADMIN — POLYETHYLENE GLYCOL 3350 17 G: 17 POWDER, FOR SOLUTION ORAL at 08:54

## 2025-05-05 RX ADMIN — COLCHICINE 0.6 MG: 0.6 TABLET ORAL at 06:16

## 2025-05-05 RX ADMIN — ATORVASTATIN CALCIUM 20 MG: 20 TABLET, FILM COATED ORAL at 06:16

## 2025-05-05 RX ADMIN — FAMOTIDINE 40 MG: 20 TABLET ORAL at 08:53

## 2025-05-05 RX ADMIN — BUSPIRONE HYDROCHLORIDE 10 MG: 10 TABLET ORAL at 06:16

## 2025-05-05 RX ADMIN — COLCHICINE 0.6 MG: 0.6 TABLET ORAL at 18:00

## 2025-05-05 RX ADMIN — FAMOTIDINE 40 MG: 20 TABLET ORAL at 22:24

## 2025-05-05 RX ADMIN — ESCITALOPRAM OXALATE 20 MG: 20 TABLET ORAL at 08:54

## 2025-05-05 ASSESSMENT — PAIN SCALES - GENERAL: PAINLEVEL_OUTOF10: 0 - NO PAIN

## 2025-05-05 NOTE — CARE PLAN
Problem: Pain - Adult  Goal: Verbalizes/displays adequate comfort level or baseline comfort level  Outcome: Progressing     Problem: Safety - Adult  Goal: Free from fall injury  Outcome: Progressing     Problem: Discharge Planning  Goal: Discharge to home or other facility with appropriate resources  Outcome: Progressing     Problem: Chronic Conditions and Co-morbidities  Goal: Patient's chronic conditions and co-morbidity symptoms are monitored and maintained or improved  Outcome: Progressing     Problem: Nutrition  Goal: Nutrient intake appropriate for maintaining nutritional needs  Outcome: Progressing     Problem: Fall/Injury  Goal: Not fall by end of shift  Outcome: Progressing  Goal: Be free from injury by end of the shift  Outcome: Progressing  Goal: Verbalize understanding of personal risk factors for fall in the hospital  Outcome: Progressing  Goal: Verbalize understanding of risk factor reduction measures to prevent injury from fall in the home  Outcome: Progressing  Goal: Use assistive devices by end of the shift  Outcome: Progressing  Goal: Pace activities to prevent fatigue by end of the shift  Outcome: Progressing     Problem: Pain  Goal: Takes deep breaths with improved pain control throughout the shift  Outcome: Progressing  Goal: Turns in bed with improved pain control throughout the shift  Outcome: Progressing  Goal: Walks with improved pain control throughout the shift  Outcome: Progressing  Goal: Performs ADL's with improved pain control throughout shift  Outcome: Progressing  Goal: Participates in PT with improved pain control throughout the shift  Outcome: Progressing  Goal: Free from opioid side effects throughout the shift  Outcome: Progressing  Goal: Free from acute confusion related to pain meds throughout the shift  Outcome: Progressing  e      The clinical goals for the shift include pt to remain injury free by the end of shift

## 2025-05-05 NOTE — PROGRESS NOTES
Subjective Data:  Patient seen this AM, denies SOB or chest pain. Reports some discomfort with deep breathing. Also reports bag has not been drained or produced any further drainage for some time. 60cc charted as output in the last 24 hours however drain appears to be clotted off. Requested limited TTE to eval residual effusion and potential drain removal today.     Overnight Events:    None      Objective Data:  Last Recorded Vitals:  Vitals:    05/05/25 0400 05/05/25 0542 05/05/25 0700 05/05/25 1140   BP:   114/73 114/77   BP Location:   Left arm    Pulse: 76   84   Resp: 17   18   Temp:   36.4 °C (97.5 °F) 36.4 °C (97.5 °F)   TempSrc:   Temporal    SpO2:   96% 97%   Weight:  70.5 kg (155 lb 6.8 oz)         Last Labs:  CBC - 5/5/2025:  8:18 AM  4.5 12.5 214    38.2      CMP - 5/5/2025:  8:18 AM  9.2 6.4 20 --- 0.7   3.7 3.7 19 85      PTT - 5/2/2025:  5:05 PM  1.0   11.3 33     HGBA1C   Date/Time Value Ref Range Status   02/23/2022 07:34 AM 6.1 4.7 - 6.4 % Final     Comment:     Interpretation:     Standardized A1c  Good control or normal:  4-6% ( mg/dL avg)  Moderate control:        6.1-8.0% (120-180 mg/dL avg)  Poor control:            >8.0% (180 mg/dL avg)  With 4% as a baseline, each 1% increase = 30 mg/dL increase in average   glucose.  Taken from DCCT (Diabetes Control Complications Trial)   10/12/2021 06:13 AM 6.4 4.7 - 6.4 % Final     Comment:     Interpretation:     Standardized A1c  Good control or normal:  4-6% ( mg/dL avg)  Moderate control:        6.1-8.0% (120-180 mg/dL avg)  Poor control:            >8.0% (180 mg/dL avg)  With 4% as a baseline, each 1% increase = 30 mg/dL increase in average   glucose.  Taken from DCCT (Diabetes Control Complications Trial)      Last I/O:  I/O last 3 completed shifts:  In: 600 (8.5 mL/kg) [P.O.:600]  Out: 360 (5.1 mL/kg) [Urine:300 (0.1 mL/kg/hr); Drains:60]  Weight: 70.5 kg     Past Cardiology Tests (Last 3 Years):  EKG:  ECG 12 Lead 04/24/2025  "(Preliminary)    Echo:  Transthoracic Echo (TTE) Limited 05/03/2025      Transthoracic Echo (TTE) Limited 05/02/2025      Transthoracic Echo (TTE) Complete 05/01/2025    Ejection Fractions:  EF   Date/Time Value Ref Range Status   05/03/2025 08:30 AM 63 %    05/02/2025 05:22 PM 63 %    05/01/2025 10:55 AM 53 %      Cath:  Cardiac Catheterization Procedure 05/02/2025    Stress Test:  No results found for this or any previous visit from the past 1095 days.    Cardiac Imaging:  No results found for this or any previous visit from the past 1095 days.      Inpatient Medications:  Scheduled Medications[1]  PRN Medications[2]  Continuous Medications[3]    Physical Exam:  General: NAD, lying in bed  Skin: warm and dry   Head/ neck: no JVD seen at 90 degrees  Cardiac: RRR, S1, S2, pericardial drain in place with serous fluid in drainage bag but appears drain clotted off and no longer able to drain  Pulm: CTAB, room air   GI: soft, nontender   Extremities: no LE edema   Neuro: no focal neuro deficits   Psych: appropriate mood and behavior       Assessment/Plan   Raquel Palacios \"Mary\" is a 69 y.o. 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 Álvarez, s/p chemoRT with capecitabine/mitomycin Aug-Sep 2024), HTN, HLD, tobacco use disorder, GERD, anxiety incidentally found to have pericardial effusion on surveillance CT. TTE with large pericardial effusion with early RV diastolic collapse. S/p RHC/LHC and pericardiocentesis 5/2 with 500cc serosanguinous fluid removed.     Pericardial Effusion  - Initial TTE on 5/1 showed large pericardial effusion  - 5/2 s/p successful pericardiacentesis of 500 ml Serosanguinous  fluid removal   - pericardial fluid sent for analysis  - Pericardial drain was sutured in place and attached to gravity  - ~?60 cc drained in last 24 hours, as of 5/5 AM it appears drain tubing has clotted off   - repeat limited TTE for residual fluid, depending on effusion " evaluation may be able to remove drain today      Recommendations:  encourage frequent position changes to facilitate optimal drainage of effusion  will consider drain removal when <50 cc out in 24 hour period and no residual fluid seen on limited echo  Primary team to follow up pericardial fluid analysis  interventional cardiology will continue to follow, will defer further care to primary team   5/5 TTE with trivial residual effusion, discussed with Dr Spencer, drain removed at bedside around 1645 without oozing, clean/ dry dressing placed over site      Drain removal discussed with primary team, Interventional Cardiology will sign off.       Code Status:  Full Code    Salima Nelson CNP  Interventional Cardiology     CICU Fellow Pager: [phone] anytime  Endovascular /Limb Salvage Team Pager: [phone] for day coverage (8am-5pm)  Interventional Cardiology Fellow Pager: [phone] (7AM-5PM) Night coverage: [facility] Cross Cover [id number]  Night coverage: [facility] [id number]         TICO Barrientos-CHRIS         [1]   Scheduled medications   Medication Dose Route Frequency    [Held by provider] amLODIPine  10 mg oral Daily    atorvastatin  20 mg oral Daily    busPIRone  10 mg oral Daily    colchicine  0.6 mg oral q12h    ergocalciferol  1.25 mg oral Weekly    escitalopram  20 mg oral Daily    famotidine  40 mg oral BID    lidocaine  1 patch transdermal Daily    [Held by provider] losartan  100 mg oral Daily    nicotine  1 patch transdermal Daily    Followed by    [START ON 6/13/2025] nicotine  1 patch transdermal Daily    Followed by    [START ON 6/27/2025] nicotine  1 patch transdermal Daily    polyethylene glycol  17 g oral Daily   [2]   PRN medications   Medication    acetaminophen    cyclobenzaprine    nicotine polacrilex    ondansetron    Or    ondansetron   [3]   Continuous Medications   Medication Dose Last Rate

## 2025-05-05 NOTE — CARE PLAN
The patient's goals for the shift include  pt to remain hds throughout shift    The clinical goals for the shift include pt to remain free of pain throughout shift

## 2025-05-05 NOTE — PROGRESS NOTES
Internal Medicine Progress Note        Subjective       Subjective   NAEO. This morning patient asking when the drain can be removed. Reports that it has not been draining much fluid in the last few days.        Objective   Objective     Vitals: I/O:   Vitals:    05/05/25 0700   BP: 114/73   Pulse:    Resp:    Temp: 36.4 °C (97.5 °F)   SpO2: 96%      24hr Min/Max:  Temp  Min: 36 °C (96.8 °F)  Max: 37 °C (98.6 °F)  Pulse  Min: 70  Max: 106  BP  Min: 97/78  Max: 131/74  Resp  Min: 14  Max: 30  SpO2  Min: 90 %  Max: 99 %   Intake/Output Summary (Last 24 hours) at 5/5/2025 0818  Last data filed at 5/5/2025 0100  Gross per 24 hour   Intake 0 ml   Output 60 ml   Net -60 ml         Physical Exam:   General: Awake, alert, not in any acute distress.   HEENT: no scleral icterus, EOMI  Pulmonary: CTAB, normal respiratory effort, not on supplemental oxygen  Cardiac: RRR, no M/R/G, Pericardial drain in place, draining serosanguinous fluid. Drain insertion site clean, dry, intact.   Abdomen: Soft, non distended, non tender  EXT: No peripheral edema, no asymmetry noted  Neuro: AOx4, moving all limbs spontaneously, follows commands  Psych: Coherent thought process, appropriate mood and affect    General Chemistry Labs    RFP  Results from last 72 hours   Lab Units 05/04/25  0106 05/03/25  0252 05/02/25  1705   GLUCOSE mg/dL 96 106* 95   SODIUM mmol/L 139 137 139   POTASSIUM mmol/L 4.2 4.1 3.7   CHLORIDE mmol/L 106 107 108*   CO2 mmol/L 24 24 21   BUN mg/dL 15 17 15   CREATININE mg/dL 0.96 0.80 0.77   ANION GAP mmol/L 13 10 14   EGFR mL/min/1.73m*2 65 81 85   CALCIUM mg/dL 8.8 9.1 9.2   PHOSPHORUS mg/dL 3.5 4.6 4.5   MAGNESIUM mg/dL 2.19 1.90 1.90   ALBUMIN g/dL 3.9 4.1 4.0   ALK PHOS U/L  --   --  85   ALT U/L  --   --  19   AST U/L  --   --  20   BILIRUBIN TOTAL mg/dL  --   --  0.7   BILIRUBIN DIRECT mg/dL  --   --  0.1   PROTEIN TOTAL g/dL  --   --  6.4      CBC  Results from last 72 hours   Lab Units 05/04/25  0108  "05/03/25  0252 05/02/25  1705   WBC AUTO x10*3/uL 7.6 7.2 4.1*   HEMOGLOBIN g/dL 12.1 12.0 11.5*   HEMATOCRIT % 35.9* 35.1* 34.7*   MCV fL 93 94 94   MCH pg 31.3 32.0 31.3   MCHC g/dL 33.7 34.2 33.1   RDW % 15.0* 15.2* 15.5*   PLATELETS AUTO x10*3/uL 190 206 185   NEUTROS PCT AUTO % 75.2 81.8 64.8   LYMPHS PCT AUTO % 12.1 8.2 19.3   MONOS PCT AUTO % 12.2 7.8 12.8   EOS PCT AUTO % 0.1 1.0 2.2     Coagulation Labs  Results from last 72 hours   Lab Units 05/02/25  1705   INR  1.0   PROTIME seconds 11.3   APTT seconds 33     Cardiac Labs      Micro/ID:   No results found for: \"URINECULTURE\", \"BLOODCULT\", \"CSFCULTSMEAR\"          Scheduled Medications:  PRN Medications:  Continuous Medications:   [Held by provider] amLODIPine, 10 mg, oral, Daily  atorvastatin, 20 mg, oral, Daily  busPIRone, 10 mg, oral, Daily  colchicine, 0.6 mg, oral, q12h  ergocalciferol, 1.25 mg, oral, Weekly  escitalopram, 20 mg, oral, Daily  famotidine, 40 mg, oral, BID  lidocaine, 1 patch, transdermal, Daily  [Held by provider] losartan, 100 mg, oral, Daily  nicotine, 1 patch, transdermal, Daily   Followed by  [START ON 6/13/2025] nicotine, 1 patch, transdermal, Daily   Followed by  [START ON 6/27/2025] nicotine, 1 patch, transdermal, Daily  polyethylene glycol, 17 g, oral, Daily     PRN medications: acetaminophen, cyclobenzaprine, nicotine polacrilex, ondansetron **OR** ondansetron       Summary of key imaging results:    Imaging  No results found.    Cardiology, Vascular, and Other Imaging  Transthoracic Echo (TTE) Limited  Result Date: 5/3/2025   [facility], [address] [place] [address]                Tel [phone] and Fax [phone] TRANSTHORACIC ECHOCARDIOGRAM REPORT  Patient Name:       ANA LILIA Schrader Physician:    [id number] John Pugh MD Study Date:         5/3/2025            Ordering Provider:    [id number] GARZA"                                     GILLOMBARDMERCED MRN/PID:            [id number]            Fellow: Accession#:         [id number]        Nurse: Date of Birth/Age:  1955 / 69      Sonographer:          Yayo thompson RDCS Gender assigned at  F                   Additional Staff: Birth: Height:             167.64 cm           Admit Date: Weight:             78.93 kg            Admission Status:     Inpatient -                                                               Routine BSA / BMI:          1.89 m2 / 28.08     Encounter#:           [id number]                     kg/m2 Blood Pressure:     125/64 mmHg         Department Location:  [facility] Study Type:    TRANSTHORACIC ECHO (TTE) LIMITED Diagnosis/ICD: Other pericardial effusion (noninflammatory)-I31.39 Indication:    pericardial effusion s/p pericardiocentesis CPT Code:      Echo Limited-53006; Doppler Limited-94793; Color Doppler-83574 Patient History: Pertinent History: HTN, pericardial effusion c/b tamponade s/p                    pericardiocentesis 5/2/25. Study Detail: The following Echo studies were performed: 2D, M-Mode, Doppler and               color flow. Technically challenging study due to drain in place in               apical window, prominent lung artifact, patient lying in supine               position and body habitus. Definity used as a contrast agent for               endocardial border definition. Total contrast used for this               procedure was 2 mL via IV push.  PHYSICIAN INTERPRETATION: Left Ventricle: Left ventricular ejection fraction is normal, by visual estimate at 60-65%. There are no regional left ventricular wall motion abnormalities. The left ventricular cavity size is normal. There is normal septal and normal posterior left ventricular wall thickness. Spectral Doppler shows a normal pattern of left ventricular diastolic filling. Left Atrium: The left  atrial size is normal. Right Ventricle: The right ventricle is normal in size. There is normal right ventricular global systolic function. Right Atrium: The right atrium is normal in size. Aortic Valve: The aortic valve is trileaflet. There is no evidence of aortic valve regurgitation. Mitral Valve: The mitral valve is normal in structure. There is no evidence of mitral valve regurgitation. Tricuspid Valve: The tricuspid valve is structurally normal. There is trace tricuspid regurgitation. Pulmonic Valve: The pulmonic valve is not well visualized. Pulmonic valve regurgitation was not assessed. Pericardium: Trivial to small pericardial effusion. Aorta: The aortic root is normal. Pulmonary Artery: The tricuspid regurgitant velocity is 2.78 m/s, and with an estimated right atrial pressure of 3 mmHg, the estimated pulmonary artery pressure is borderline elevated with the RVSP at 33.9 mmHg. Systemic Veins: The inferior vena cava appears normal in size, with IVC inspiratory collapse greater than 50%. In comparison to the previous echocardiogram(s): Compared with study dated 5/2/2025, Interval drainage of large pericardial effusion. Small-trivial residual pocket lateral to the LV.  CONCLUSIONS:  1. Left ventricular ejection fraction is normal, by visual estimate at 60-65%.  2. There is normal right ventricular global systolic function. QUANTITATIVE DATA SUMMARY:  2D MEASUREMENTS:         Normal Ranges: IVSd:            0.80 cm (0.6-1.1cm) LVPWd:           0.70 cm (0.6-1.1cm) LVIDd:           3.90 cm (3.9-5.9cm) LVIDs:           2.50 cm LV Mass Index:   44 g/m2 LV % FS          35.9 %  LV SYSTOLIC FUNCTION:                      Normal Ranges: EF-Visual:      63 % LV EF Reported: 63 %  LV DIASTOLIC FUNCTION:           Normal Ranges: MV Peak E:             0.79 m/s  (0.7-1.2 m/s) MV Peak A:             0.78 m/s  (0.42-0.7 m/s) E/A Ratio:             1.02      (1.0-2.2) MV e'                  0.100 m/s (>8.0) MV lateral e'      "     0.13 m/s MV medial e'           0.07 m/s E/e' Ratio:            7.93      (<8.0)  MITRAL VALVE:          Normal Ranges: MV DT:        232 msec (150-240msec)  RIGHT VENTRICLE: TAPSE: 15.7 mm RV s'  0.16 m/s  TRICUSPID VALVE/RVSP:          Normal Ranges: Peak TR Velocity:     2.78 m/s Est. RA Pressure:     3 mmHg RV Syst Pressure:     34 mmHg  (< 30mmHg)  [id number] John Pugh MD Electronically signed on 5/3/2025 at 8:41:17 AM  ** Final **     Cardiac Catheterization Procedure LHC/RHC 5/2/2025:  1. Large pericardial effusion with early tamponade physiology. Pericardiocentesis performed, draining 500 ml of bloodstained fluid. Pigtail drain left sutured in-situ.   2. Non obstructive coronary atherosclerosis in a right dominant system.   3. Mildly elevated Right heart filling pressures with preserved CO and CI.  RV 46/5  PA 41/15, mean 28mmHg  PW mean 11mmHg  CO 5.59/ CI 3.05.  -----Remove the drain once daily drainage falls below 25-50 ml.-----    Assessment/Plan   Assessment and Plan    Raquel Palacios \"Mary\" is a 69 y.o. 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 Álvarez, s/p chemoRT with capecitabine/mitomycin Aug-Sep 2024), HTN, HLD, tobacco use disorder, GERD, anxiety incidentally found to have pericardial effusion on surveillance CT. TTE with large pericardial effusion with early RV diastolic collapse. S/p RHC/LHC and pericardiocentesis 5/2 with 500cc serosanguinous fluid removed.     Updates 05/05/25  - Discussed with interventional cardiology team. Pericardial drain appears clotted and not draining. TTE ordered to assess for effusion prior to pulling drain. If minimal to no effusion can consider removing drain.   - continue colchicine. Expect 1 month treatment course  - If continues to have pleuritic pain, will consider starting NSAIDs.   - Anticipate discharge tomorrow if drain is able to be removed today  - will need follow up with cardiology and PCP upon " discharge  - pericardial fluid cytology pending      #Pericardial effusion with tamponade s/p pericardiocentesis and pigtail drain  #HTN  #HLD  :: RHC/LHC 5/2 Mildly elevated filling pressures in the RA, with normal CO and CI. Non obstructive CAD in a right dominant system   ::s/p RHC/LHC with pericardiocentesis (500cc serosanginous fluid removed) and pigtail drain  :: TTE 5/3 with trivial to small pericardial effusion, EF 60-65%, normal RV global systolic function   Plan:  - pericardial fluid cytology pending   -Discussed with interventional cardiology team. Pericardial drain appears clotted and not draining. TTE ordered to assess for effusion prior to pulling drain. If minimal to no effusion can consider removing drain.   - c/w colchicine 0.6mg q12h for 1 month (will consider 3 month course if concern for pericarditis)  - If continues to have pleuritic pain, will consider starting NSAIDs.   - holding home amlodipine 10mg daily and losartan 100mg daily            #Squamous cell carcinoma of the anus s/p chemoXRT   :: follows with Dr. Álvarez. anoscopy 3/18/25 with mild erythema and tail ectasias involving the L posterior anal canal, scar rissue resolved with no evidence of concerning anal or perianal skin lesions. MRI rectum w/wo 4/30/25 revealed posttreatment changes without residual measurable disease mass or LAD  :: CT C/A/P 12/2024: interval increase in moderate pericardial effusion with heterogeneous hepatic enhancement and body wall edema, c/f cardiac dysfunction. New mild R hydronphrosis and hydroureter extending to the elvel of the ureterovesicular junction without evidence of definite stone or mass. Radiation associated early fibrosis cannot be ruled out   - FU outpt, pleural fluid cytology pending         #R hydronephrosis on CT   :: CT C/A/P 12/2024: New mild R hydronphrosis and hydroureter extending to the elvel of the ureterovesicular junction without evidence of definite stone or mass. Radiation  associated early fibrosis cannot be ruled out  - CTM, consider CT urogram         #GERD  - c/w famotidine 40mg bid     #Anxiety  - c/w home busbar 10mg daily, Lexapro 20mg daily         Medical Check List   FEN  -Fluids: PRN   -Electrolytes: PRN, with goals of Mg >2, K>4  -Nutrition: Adult diet Regular, Cardiac; 70 gm fat; 2 - 3 grams Sodium  Prophylaxis:  -DVT ppx: SCDs  -GI ppx/Bowel care: Famotidine and Miralax  -Abx: This patient does not have an active medication from one of the medication groupers.  -Pain regimen: Tylenol, Flexiril  Hardware:  -Drains: Pericardial pigtail Drain  -Lines: PIV  Social:  -Code: Full Code  -NOK/Surrogate Decision Maker: Boy Frances [phone]     Patient assessment and plan staffed with the attending physician on service.    Mitchell Mendosa MD  Internal Medicine PGY-1  5/5/2025    Disclaimer: Documentation completed with the information available at the time of input. The times in the chart may not be reflective of actual patient care times, interventions, or procedures. Documentation occurs after the physical care of the patient.

## 2025-05-05 NOTE — DISCHARGE INSTRUCTIONS
Maynor Ms. Palacios,     You were admitted to the hospital for concerns of fluid around your heart that was noted incidentally on surveillance CT for your squamous cell cancer. Due to the fluid seen around your heart on the CT scan, we did an echocardiogram that confirmed significant amount of fluid in the pericardial space. You were then taken to the cardiac cath lab where the cardiologist placed a pericardial drain. Over the next few days the drain was monitored for its output, and once the fluid drainage slowed/stopped, we repeated an echocardiogram which showed that the fluid around your heart resolved. We then took out the pericardial drain that was placed and you are now stable for discharge. Due the fluid that accumulated around your heart, we started you on a medicine called Colchicine. You will need to take this medication for 1 month. Please take it as prescribed. Please stop taking your blood pressure meds (amlodipine and losartan) until you follow up with your PCP or cardiologist as your blood pressures in the hospital have been normal without any medications.     You will also need to follow up with cardiology as an outpatient. A referral has been sent on your behalf. Please schedule that appointment around 1 month from now. If you have chest pain, please call your PCP or cardiologist or come to the emergency department for further evaluation.       Medications:  Colchicine 0.6mg twice a day    Please take the rest of your medications as prescribed per your After Visit Summary      Follow up with:  We have requested an automated system to call you to schedule; however if you do not hear from them in 3 days, please call [facility] appointment line: [phone] or [phone] to make the appointment yourself    Cardiology; referral sent  PCP    It was a pleasure caring for you at .    Sincerely,  Your  Cardiology Team

## 2025-05-06 ENCOUNTER — PHARMACY VISIT (OUTPATIENT)
Dept: PHARMACY | Facility: CLINIC | Age: 70
End: 2025-05-06
Payer: COMMERCIAL

## 2025-05-06 VITALS
TEMPERATURE: 96.8 F | RESPIRATION RATE: 18 BRPM | SYSTOLIC BLOOD PRESSURE: 104 MMHG | OXYGEN SATURATION: 96 % | BODY MASS INDEX: 25.09 KG/M2 | HEART RATE: 74 BPM | DIASTOLIC BLOOD PRESSURE: 69 MMHG | WEIGHT: 155.42 LBS

## 2025-05-06 LAB
ALBUMIN SERPL BCP-MCNC: 3.9 G/DL (ref 3.4–5)
ANION GAP SERPL CALC-SCNC: 14 MMOL/L (ref 10–20)
BASOPHILS # BLD AUTO: 0.02 X10*3/UL (ref 0–0.1)
BASOPHILS NFR BLD AUTO: 0.6 %
BUN SERPL-MCNC: 16 MG/DL (ref 6–23)
CALCIUM SERPL-MCNC: 9.7 MG/DL (ref 8.6–10.6)
CHLORIDE SERPL-SCNC: 106 MMOL/L (ref 98–107)
CO2 SERPL-SCNC: 23 MMOL/L (ref 21–32)
CREAT SERPL-MCNC: 0.84 MG/DL (ref 0.5–1.05)
EGFRCR SERPLBLD CKD-EPI 2021: 76 ML/MIN/1.73M*2
EOSINOPHIL # BLD AUTO: 0.1 X10*3/UL (ref 0–0.7)
EOSINOPHIL NFR BLD AUTO: 3.1 %
ERYTHROCYTE [DISTWIDTH] IN BLOOD BY AUTOMATED COUNT: 14.9 % (ref 11.5–14.5)
GLUCOSE SERPL-MCNC: 81 MG/DL (ref 74–99)
HCT VFR BLD AUTO: 41.6 % (ref 36–46)
HGB BLD-MCNC: 13.1 G/DL (ref 12–16)
IMM GRANULOCYTES # BLD AUTO: 0 X10*3/UL (ref 0–0.7)
IMM GRANULOCYTES NFR BLD AUTO: 0 % (ref 0–0.9)
LABORATORY COMMENT REPORT: NORMAL
LABORATORY COMMENT REPORT: NORMAL
LYMPHOCYTES # BLD AUTO: 0.69 X10*3/UL (ref 1.2–4.8)
LYMPHOCYTES NFR BLD AUTO: 21.1 %
MAGNESIUM SERPL-MCNC: 2.08 MG/DL (ref 1.6–2.4)
MCH RBC QN AUTO: 31.6 PG (ref 26–34)
MCHC RBC AUTO-ENTMCNC: 31.5 G/DL (ref 32–36)
MCV RBC AUTO: 101 FL (ref 80–100)
MONOCYTES # BLD AUTO: 0.52 X10*3/UL (ref 0.1–1)
MONOCYTES NFR BLD AUTO: 15.9 %
NEUTROPHILS # BLD AUTO: 1.94 X10*3/UL (ref 1.2–7.7)
NEUTROPHILS NFR BLD AUTO: 59.3 %
NRBC BLD-RTO: 0 /100 WBCS (ref 0–0)
PATH REPORT.FINAL DX SPEC: NORMAL
PATH REPORT.GROSS SPEC: NORMAL
PATH REPORT.RELEVANT HX SPEC: NORMAL
PATH REPORT.TOTAL CANCER: NORMAL
PHOSPHATE SERPL-MCNC: 4.2 MG/DL (ref 2.5–4.9)
PLATELET # BLD AUTO: 233 X10*3/UL (ref 150–450)
POTASSIUM SERPL-SCNC: 3.9 MMOL/L (ref 3.5–5.3)
RBC # BLD AUTO: 4.14 X10*6/UL (ref 4–5.2)
RESIDENT REVIEW: NORMAL
SODIUM SERPL-SCNC: 139 MMOL/L (ref 136–145)
WBC # BLD AUTO: 3.3 X10*3/UL (ref 4.4–11.3)

## 2025-05-06 PROCEDURE — 85025 COMPLETE CBC W/AUTO DIFF WBC: CPT

## 2025-05-06 PROCEDURE — 2500000001 HC RX 250 WO HCPCS SELF ADMINISTERED DRUGS (ALT 637 FOR MEDICARE OP)

## 2025-05-06 PROCEDURE — 83735 ASSAY OF MAGNESIUM: CPT

## 2025-05-06 PROCEDURE — 99238 HOSP IP/OBS DSCHRG MGMT 30/<: CPT

## 2025-05-06 PROCEDURE — 2500000002 HC RX 250 W HCPCS SELF ADMINISTERED DRUGS (ALT 637 FOR MEDICARE OP, ALT 636 FOR OP/ED)

## 2025-05-06 PROCEDURE — 99291 CRITICAL CARE FIRST HOUR: CPT | Performed by: INTERNAL MEDICINE

## 2025-05-06 PROCEDURE — 80069 RENAL FUNCTION PANEL: CPT

## 2025-05-06 PROCEDURE — 36415 COLL VENOUS BLD VENIPUNCTURE: CPT

## 2025-05-06 PROCEDURE — S4991 NICOTINE PATCH NONLEGEND: HCPCS

## 2025-05-06 RX ORDER — AMOXICILLIN 250 MG
2 CAPSULE ORAL ONCE
Status: COMPLETED | OUTPATIENT
Start: 2025-05-06 | End: 2025-05-06

## 2025-05-06 RX ORDER — BISACODYL 10 MG/1
10 SUPPOSITORY RECTAL ONCE
Status: COMPLETED | OUTPATIENT
Start: 2025-05-06 | End: 2025-05-06

## 2025-05-06 RX ADMIN — COLCHICINE 0.6 MG: 0.6 TABLET ORAL at 06:19

## 2025-05-06 RX ADMIN — ESCITALOPRAM OXALATE 20 MG: 20 TABLET ORAL at 09:04

## 2025-05-06 RX ADMIN — FAMOTIDINE 40 MG: 20 TABLET ORAL at 09:04

## 2025-05-06 RX ADMIN — BISACODYL 10 MG: 10 SUPPOSITORY RECTAL at 04:15

## 2025-05-06 RX ADMIN — ATORVASTATIN CALCIUM 20 MG: 20 TABLET, FILM COATED ORAL at 06:19

## 2025-05-06 RX ADMIN — BUSPIRONE HYDROCHLORIDE 10 MG: 10 TABLET ORAL at 06:18

## 2025-05-06 RX ADMIN — SENNOSIDES AND DOCUSATE SODIUM 2 TABLET: 50; 8.6 TABLET ORAL at 04:15

## 2025-05-06 RX ADMIN — NICOTINE 1 PATCH: 21 PATCH, EXTENDED RELEASE TRANSDERMAL at 09:04

## 2025-05-06 SDOH — HEALTH STABILITY: PHYSICAL HEALTH
HOW OFTEN DO YOU NEED TO HAVE SOMEONE HELP YOU WHEN YOU READ INSTRUCTIONS, PAMPHLETS, OR OTHER WRITTEN MATERIAL FROM YOUR DOCTOR OR PHARMACY?: NEVER

## 2025-05-06 SDOH — ECONOMIC STABILITY: HOUSING INSECURITY: AT ANY TIME IN THE PAST 12 MONTHS, WERE YOU HOMELESS OR LIVING IN A SHELTER (INCLUDING NOW)?: NO

## 2025-05-06 SDOH — SOCIAL STABILITY: SOCIAL INSECURITY
WITHIN THE LAST YEAR, HAVE YOU BEEN RAPED OR FORCED TO HAVE ANY KIND OF SEXUAL ACTIVITY BY YOUR PARTNER OR EX-PARTNER?: NO

## 2025-05-06 SDOH — ECONOMIC STABILITY: FOOD INSECURITY: WITHIN THE PAST 12 MONTHS, YOU WORRIED THAT YOUR FOOD WOULD RUN OUT BEFORE YOU GOT THE MONEY TO BUY MORE.: NEVER TRUE

## 2025-05-06 SDOH — SOCIAL STABILITY: SOCIAL INSECURITY
WITHIN THE LAST YEAR, HAVE YOU BEEN KICKED, HIT, SLAPPED, OR OTHERWISE PHYSICALLY HURT BY YOUR PARTNER OR EX-PARTNER?: NO

## 2025-05-06 SDOH — ECONOMIC STABILITY: HOUSING INSECURITY: IN THE LAST 12 MONTHS, WAS THERE A TIME WHEN YOU WERE NOT ABLE TO PAY THE MORTGAGE OR RENT ON TIME?: NO

## 2025-05-06 SDOH — SOCIAL STABILITY: SOCIAL INSECURITY: WITHIN THE LAST YEAR, HAVE YOU BEEN HUMILIATED OR EMOTIONALLY ABUSED IN OTHER WAYS BY YOUR PARTNER OR EX-PARTNER?: NO

## 2025-05-06 SDOH — ECONOMIC STABILITY: FOOD INSECURITY: WITHIN THE PAST 12 MONTHS, THE FOOD YOU BOUGHT JUST DIDN'T LAST AND YOU DIDN'T HAVE MONEY TO GET MORE.: NEVER TRUE

## 2025-05-06 SDOH — ECONOMIC STABILITY: INCOME INSECURITY: IN THE PAST 12 MONTHS HAS THE ELECTRIC, GAS, OIL, OR WATER COMPANY THREATENED TO SHUT OFF SERVICES IN YOUR HOME?: NO

## 2025-05-06 SDOH — SOCIAL STABILITY: SOCIAL INSECURITY: WITHIN THE LAST YEAR, HAVE YOU BEEN AFRAID OF YOUR PARTNER OR EX-PARTNER?: NO

## 2025-05-06 SDOH — ECONOMIC STABILITY: TRANSPORTATION INSECURITY: IN THE PAST 12 MONTHS, HAS LACK OF TRANSPORTATION KEPT YOU FROM MEDICAL APPOINTMENTS OR FROM GETTING MEDICATIONS?: NO

## 2025-05-06 SDOH — ECONOMIC STABILITY: FOOD INSECURITY: HOW HARD IS IT FOR YOU TO PAY FOR THE VERY BASICS LIKE FOOD, HOUSING, MEDICAL CARE, AND HEATING?: NOT HARD AT ALL

## 2025-05-06 ASSESSMENT — COGNITIVE AND FUNCTIONAL STATUS - GENERAL
MOBILITY SCORE: 24
DAILY ACTIVITIY SCORE: 24

## 2025-05-06 ASSESSMENT — ACTIVITIES OF DAILY LIVING (ADL): LACK_OF_TRANSPORTATION: NO

## 2025-05-06 ASSESSMENT — PAIN SCALES - GENERAL: PAINLEVEL_OUTOF10: 0 - NO PAIN

## 2025-05-06 NOTE — CARE PLAN
Problem: Pain - Adult  Goal: Verbalizes/displays adequate comfort level or baseline comfort level  Outcome: Progressing     Problem: Safety - Adult  Goal: Free from fall injury  Outcome: Progressing     Problem: Discharge Planning  Goal: Discharge to home or other facility with appropriate resources  Outcome: Progressing     Problem: Chronic Conditions and Co-morbidities  Goal: Patient's chronic conditions and co-morbidity symptoms are monitored and maintained or improved  Outcome: Progressing     Problem: Nutrition  Goal: Nutrient intake appropriate for maintaining nutritional needs  Outcome: Progressing     Problem: Fall/Injury  Goal: Not fall by end of shift  Outcome: Progressing  Goal: Be free from injury by end of the shift  Outcome: Progressing  Goal: Verbalize understanding of personal risk factors for fall in the hospital  Outcome: Progressing  Goal: Verbalize understanding of risk factor reduction measures to prevent injury from fall in the home  Outcome: Progressing  Goal: Use assistive devices by end of the shift  Outcome: Progressing  Goal: Pace activities to prevent fatigue by end of the shift  Outcome: Progressing     Problem: Pain  Goal: Takes deep breaths with improved pain control throughout the shift  Outcome: Progressing  Goal: Turns in bed with improved pain control throughout the shift  Outcome: Progressing  Goal: Walks with improved pain control throughout the shift  Outcome: Progressing  Goal: Performs ADL's with improved pain control throughout shift  Outcome: Progressing  Goal: Participates in PT with improved pain control throughout the shift  Outcome: Progressing  Goal: Free from opioid side effects throughout the shift  Outcome: Progressing  Goal: Free from acute confusion related to pain meds throughout the shift  Outcome: Progressing   The patient's goals for the shift include  to go home tomorrow   The clinical goals for the shift include pt to have BM    Over the shift, the patient  did not make progress toward the following goals. Barriers to progression include previous health history. Recommendations to address these barriers include doc notified, get more stool softeners.

## 2025-05-06 NOTE — PROGRESS NOTES
05/06/25 1328   Discharge Planning   Living Arrangements Alone   Support Systems Children;Family members   Assistance Needed independent prior to this admission   Type of Residence Private residence   Number of Stairs to Enter Residence 1   Do you have animals or pets at home? No   Who is requesting discharge planning? Provider   Home or Post Acute Services None   Expected Discharge Disposition Home   Does the patient need discharge transport arranged? No     Social Work Note    Interdisciplinary Treatment Plan : Pt is medically ready to be discharged today.  - Additional information : None noted at this time   - Support : DaughterYvrose is listed NOK. Yvrose and pt's granddaughter live in 10 mins away, and they are able to assist pt as needed. Pt also noted pt has good support from her neighborhood.   - Payor : [facility] [place] Medicare [employer]  - Planned Disposition : Home with no need. Pt's daughter or a granddaughter will be here to transport her.   - Barrier to discharge : none noted at this time.     SW met with pt at bedside to complete initial assessment for offering support and obtaining information for pt's discharge plan.  Pt is alert and fully oriented.   Per medical team, pt is medically ready to be discharged today, home-going with no need. Pt reported she is fully independent, no need of any DME and drives herself. Pt denied any further issues or questions on social work at the moment of assessment. ALBERTO will continue to follow and assist.     NINA Cooper, LSW

## 2025-05-06 NOTE — DISCHARGE SUMMARY
Discharge Diagnosis  Pericardial effusion ([facility])           Issues Requiring Follow-Up  Cardiology outpatient follow up. Referral sent  Pericardial fluid cytology, labs sent, results pending      Discharge Meds     Medication List      PAUSE taking these medications     amLODIPine 10 mg tablet; Wait to take this until your doctor or other   care provider tells you to start again.; Commonly known as: Norvasc   losartan 100 mg tablet; Wait to take this until your doctor or other   care provider tells you to start again.; Commonly known as: Cozaar     START taking these medications     colchicine 0.6 mg tablet; Take 1 tablet (0.6 mg) by mouth every 12   hours.   * nicotine 21 mg/24 hr patch; Commonly known as: Nicoderm CQ; Place 1   patch over 24 hours on the skin once daily for 38 doses.   * nicotine 14 mg/24 hr patch; Commonly known as: Nicoderm CQ; Place 1   patch over 24 hours on the skin once daily for 14 doses.; Start taking on:   June 13, 2025   * nicotine 7 mg/24 hr patch; Commonly known as: Nicoderm CQ; Place 1   patch over 24 hours on the skin once daily for 14 doses.; Start taking on:   June 27, 2025  * This list has 3 medication(s) that are the same as other medications   prescribed for you. Read the directions carefully, and ask your doctor or   other care provider to review them with you.     CHANGE how you take these medications     famotidine 40 mg tablet; Commonly known as: Pepcid; Take 1 tablet (40   mg) by mouth 2 times a day.; What changed: when to take this, reasons to   take this     CONTINUE taking these medications     acetaminophen 500 mg tablet; Commonly known as: Tylenol   atorvastatin 20 mg tablet; Commonly known as: Lipitor   azelastine 0.05 % ophthalmic solution; Commonly known as: Optivar   busPIRone 10 mg tablet; Commonly known as: Buspar   cetirizine 5 mg tablet; Commonly known as: ZyrTEC   ergocalciferol 1250 mcg (50,000 units) capsule; Commonly known as:   Vitamin D-2   escitalopram  20 mg tablet; Commonly known as: Lexapro   LORazepam 1 mg tablet; Commonly known as: Ativan; Take 1 tablet (1 mg)   by mouth every 6 hours if needed for anxiety (MRI) for up to 1 dose.   ondansetron 8 mg tablet; Commonly known as: Zofran; Take 1 tablet (8 mg)   by mouth every 8 hours if needed for nausea or vomiting.     STOP taking these medications     hydrocortisone 2.5 % rectal cream; Commonly known as: Anusol-HC   loperamide 2 mg capsule; Commonly known as: Imodium   sucralfate 1 gram tablet; Commonly known as: Carafate       Test Results Pending At Discharge  Pending Labs       Order Current Status    CBC and Auto Differential Collected (05/06/25 0741)    Magnesium Collected (05/06/25 0742)    Renal function panel Collected (05/06/25 0742)    Cytology Consultation (Non-Gynecologic) In process    AFB Culture/Smear Preliminary result    Fungal Culture/Smear Preliminary result            Hospital Course  Mary Palacios is a 69 y.o. 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 Álvarez, s/p chemoRT with capecitabine/mitomycin Aug-Sep 2024), HTN, HLD, tobacco use disorder, GERD, anxiety presenting as a direct admit for pericardiocentesis with plan for pigtail drain in-situ.      During recent surveillance CT for SCC, found to have a moderate pericardial effusion. Pt also endorsing dyspnea for the past several weeks. TTE 4/30 showed EF 50-55%, abnormal pattern of LV diastolic filling, normal RV systolic function, and a large pericardial effusion with early RV diastolic collapse. S/p pericardiocentesis with 500cc serosanguinous fluid removed; RHC/LHC 5/2 with mildly elevated filling pressures in the RA, with normal CO and CI. Non obstructive CAD in a right dominant system. Repeat TTE 5/3 with trivial to small pericardial effusion, EF 60-65%, normal RV global systolic function. Post-op patient developed pleuritic CP, started on colchicine with improvement. Pt drained 100cc and then  60ml pericardial fluid over 24h intervals. After discussion with interventional cardiology team, noticed drain was clogged and not draining on 5/5 so TTE ordered to assess for pericardial effusion prior to pulling out drain. TTE showed trivial pericardial effusion so the drain was removed. Patient was then monitored overnight and the next morning she stated that she felt well. Denied any pain with inspiration and stated she would like to go home. Patient discharged with colchicine for 1 month and cardiology outpatient referral.    #Pericardial effusion with concern for tamponade s/p pericardiocentesis and pigtail drain   - pericardial fluid cytology pending   - c/w colchicine 0.6mg q12h for 1 month (will consider 3 month course if concern for pericarditis and consider starting NSAIDs)         Pertinent Physical Exam At Time of Discharge  Physical Exam  General: Awake, alert, not in any acute distress.   HEENT: no scleral icterus, EOMI  Pulmonary: CTAB, normal respiratory effort, not on supplemental oxygen  Cardiac: RRR, no M/R/G,   Abdomen: Soft, non distended, non tender  EXT: No peripheral edema, no asymmetry noted  Neuro: AOx4, moving all limbs spontaneously, follows commands  Psych: Coherent thought process, appropriate mood and affect      Outpatient Follow-Up  Future Appointments   Date Time Provider Department [place]   7/8/2025 10:30 AM Anant Callahan MD [id number] [facility]   8/6/2025 10:15 AM GEN CT 1 GENCT [facility]   8/27/2025 11:20 AM Cristian Álvarez MD [id number] [facility]   9/18/2025 10:00 AM Luz Zhao MD [id number] [facility]   11/4/2025 11:30 AM Jorge Padilla MD [id number] [facility]         Prateek Mendosa MD

## 2025-05-07 LAB
ACID FAST STN SPEC: NORMAL
FUNGUS SPEC CULT: NORMAL
FUNGUS SPEC FUNGUS STN: NORMAL
MYCOBACTERIUM SPEC CULT: NORMAL

## 2025-05-09 LAB
ATRIAL RATE: 72 BPM
P AXIS: 52 DEGREES
P OFFSET: 191 MS
P ONSET: 146 MS
PR INTERVAL: 154 MS
Q ONSET: 223 MS
QRS COUNT: 11 BEATS
QRS DURATION: 68 MS
QT INTERVAL: 392 MS
QTC CALCULATION(BAZETT): 429 MS
QTC FREDERICIA: 416 MS
R AXIS: -19 DEGREES
T AXIS: 51 DEGREES
T OFFSET: 419 MS
VENTRICULAR RATE: 72 BPM

## 2025-05-12 ENCOUNTER — APPOINTMENT (OUTPATIENT)
Dept: CARDIOLOGY | Facility: CLINIC | Age: 70
End: 2025-05-12
Payer: MEDICARE

## 2025-05-12 VITALS
DIASTOLIC BLOOD PRESSURE: 83 MMHG | HEIGHT: 66 IN | HEART RATE: 74 BPM | OXYGEN SATURATION: 100 % | BODY MASS INDEX: 25.62 KG/M2 | SYSTOLIC BLOOD PRESSURE: 125 MMHG | WEIGHT: 159.39 LBS

## 2025-05-12 DIAGNOSIS — R93.1 ABNORMAL FINDINGS ON DIAGNOSTIC IMAGING OF HEART AND CORONARY CIRCULATION: ICD-10-CM

## 2025-05-12 DIAGNOSIS — I25.10 CORONARY ARTERY ARTERIOSCLEROSIS: ICD-10-CM

## 2025-05-12 DIAGNOSIS — I10 PRIMARY HYPERTENSION: Primary | ICD-10-CM

## 2025-05-12 DIAGNOSIS — F17.200 TOBACCO USE DISORDER: ICD-10-CM

## 2025-05-12 DIAGNOSIS — E78.5 DYSLIPIDEMIA: ICD-10-CM

## 2025-05-12 DIAGNOSIS — I31.39 PERICARDIAL EFFUSION (HHS-HCC): ICD-10-CM

## 2025-05-12 DIAGNOSIS — Z98.890 S/P PERICARDIOCENTESIS: ICD-10-CM

## 2025-05-12 PROCEDURE — 1159F MED LIST DOCD IN RCRD: CPT | Performed by: NURSE PRACTITIONER

## 2025-05-12 PROCEDURE — 3074F SYST BP LT 130 MM HG: CPT | Performed by: NURSE PRACTITIONER

## 2025-05-12 PROCEDURE — 1111F DSCHRG MED/CURRENT MED MERGE: CPT | Performed by: NURSE PRACTITIONER

## 2025-05-12 PROCEDURE — 99406 BEHAV CHNG SMOKING 3-10 MIN: CPT | Performed by: NURSE PRACTITIONER

## 2025-05-12 PROCEDURE — 3079F DIAST BP 80-89 MM HG: CPT | Performed by: NURSE PRACTITIONER

## 2025-05-12 PROCEDURE — 3008F BODY MASS INDEX DOCD: CPT | Performed by: NURSE PRACTITIONER

## 2025-05-12 PROCEDURE — G2211 COMPLEX E/M VISIT ADD ON: HCPCS | Performed by: NURSE PRACTITIONER

## 2025-05-12 PROCEDURE — 99215 OFFICE O/P EST HI 40 MIN: CPT | Performed by: NURSE PRACTITIONER

## 2025-05-12 RX ORDER — COLCHICINE 0.6 MG/1
0.6 TABLET ORAL EVERY 12 HOURS
Qty: 180 TABLET | Refills: 0 | Status: SHIPPED | OUTPATIENT
Start: 2025-05-12 | End: 2025-08-10

## 2025-05-12 NOTE — PROGRESS NOTES
Primary Care Physician: Jeremias Rubin DO      Date of Visit: 05/12/2025 10:30 AM EDT  Location of visit:  [facility]   Type of Visit: Follow up      Chief Complaint   Patient presents with    Hospital Follow-up     Had a Pericardiocentesis , and heart cath, feels ok, gets worn out easily         HPI / Summary:   Raquel Palacios is a 69 y.o. female who returns for follow up    Past medical history significant for HTN, HLD & anxiety.   Anal squamous cell carcinoma, s/p chemoradiation treatment with capecitabine/ mitomycin, completed September 2024.     She was incidentally found to have a moderate pericardial effusion on surveillance CT scan with oncology (Dr. Álvarez)    The echocardiogram showed a large pericardial effusion with early RV collapse / tamponade.  She underwent pericardiocentesis on 5/02/2025 which drained 500 ml.  LHC showed non-obstructive coronary disease     She notices her breathing is easier in the mornings and that she was having fatigue which is nearly resolved and now as likely related to the effusion.  No recurrent pleuritic discomfort.  Tolerating the colchicine.  Right radial arterial access site is healed with mild ecchymosis     12 system review is negative except as noted above  Accompanied by her daughter who contributed to the history        Medical History:   Medical History[1]    Surgical History:   Surgical History[2]    Family History:   Family History[3]    Social History:   Tobacco Use: High Risk (5/12/2025)    Patient History     Smoking Tobacco Use: Every Day     Smokeless Tobacco Use: Never     Passive Exposure: Past             MEDICATIONS:   Current Outpatient Medications   Medication Instructions    acetaminophen (TYLENOL) 1,000 mg, Every 6 hours PRN    [Paused] amLODIPine (NORVASC) 10 mg, Daily RT    atorvastatin (LIPITOR) 20 mg, Daily RT    azelastine (Optivar) 0.05 % ophthalmic solution Administer 1 drop into both eyes if needed (allergies).    busPIRone (BUSPAR)  10 mg, Daily RT    cetirizine (ZYRTEC) 5 mg, Daily PRN    colchicine 0.6 mg, oral, Every 12 hours    ergocalciferol (VITAMIN D-2) 50,000 Units, Weekly    escitalopram (LEXAPRO) 20 mg, Daily    famotidine (PEPCID) 40 mg, oral, 2 times daily    LORazepam (ATIVAN) 1 mg, oral, Every 6 hours PRN    [Paused] losartan (COZAAR) 100 mg, Daily RT    ondansetron (ZOFRAN) 8 mg, oral, Every 8 hours PRN         IMAGING REPORTS INDEPENDENTLY REVIEWED:         Echocardiogram 5/05/2025  CONCLUSIONS:   1. Poorly visualized anatomical structures due to suboptimal image quality.   2. The left ventricular systolic function is normal, with a visually estimated ejection fraction of 65-70%.   3. There is normal right ventricular global systolic function.   4. Trivial pericardial effusion    Echocardiogram limited 5/02/2025  ONCLUSIONS:   1. Left ventricular ejection fraction is normal, by visual estimate at 60-65%.   2. There is normal right ventricular global systolic function.   3. Late diastolic right atrium collapse, suggestive of tamponade.   4. Moderate to large pericardial effusion.   5. The pulmonary artery is not well visualized.   6. Note, after images were obtained, documented 500 ml pericardial fluid removed by pericardiocentesis. Limited echo interpretation based on imaging prior to fluid removal.         Cardiac catheterization / pericardiocentesis 5/02/2025  CONCLUSIONS:   1. Large pericardial effusion with early tamponade physiology. Pericardiocentesis performed, draining 500 ml of bloodstained fluid. Pigtail drain left sutured in-situ.   2. Non obstructive coronary atherosclerosis in a right dominant system.   3. Mildly elevated Right heart filling pressures with preserved CO and CI.        CT chest / abdomen/ pelvis 4/11/2025  IMPRESSION:  Anal cancer restaging scan compared to 12/11/2024. No CT evidence of  locoregional disease recurrence, though rectal evaluation is limited  by decompression. New 5 mm pulmonary nodule  along the left major  fissure - recommend follow-up imaging. No other evidence of  locoregional recurrence or metastatic disease in the chest, abdomen,  or pelvis.  1. Interval increase in moderate pericardial effusion with  heterogeneous hepatic enhancement and body wall edema, suspicious for  cardiac dysfunction. Recommend correlation with echocardiogram.  2.  new mild right hydronephrosis and hydroureter extending to the  level of the ureterovesicular junction without evidence of definite  stone or mass. Radiation associated early fibrosis can not be ruled  out. Further evaluation with CT urogram is strongly recommended.        LABS:  CBC:   Lab Results   Component Value Date    WBC 3.3 (L) 05/06/2025    RBC 4.14 05/06/2025    HGB 13.1 05/06/2025    HCT 41.6 05/06/2025     (H) 05/06/2025    MCH 31.6 05/06/2025    MCHC 31.5 (L) 05/06/2025    RDW 14.9 (H) 05/06/2025     05/06/2025     CBC with Differential:    Lab Results   Component Value Date    WBC 3.3 (L) 05/06/2025    RBC 4.14 05/06/2025    HGB 13.1 05/06/2025    HCT 41.6 05/06/2025     05/06/2025     (H) 05/06/2025    MCH 31.6 05/06/2025    MCHC 31.5 (L) 05/06/2025    RDW 14.9 (H) 05/06/2025    NRBC 0.0 05/06/2025    LYMPHOPCT 21.1 05/06/2025    MONOPCT 15.9 05/06/2025    EOSPCT 3.1 05/06/2025    BASOPCT 0.6 05/06/2025    MONOSABS 0.52 05/06/2025    LYMPHSABS 0.69 (L) 05/06/2025    EOSABS 0.10 05/06/2025    BASOSABS 0.02 05/06/2025     CMP:    Lab Results   Component Value Date     05/06/2025    K 3.9 05/06/2025     05/06/2025    CO2 23 05/06/2025    BUN 16 05/06/2025    CREATININE 0.84 05/06/2025    GLUCOSE 81 05/06/2025    PROT 6.4 05/02/2025    CALCIUM 9.7 05/06/2025    BILITOT 0.7 05/02/2025    ALKPHOS 85 05/02/2025    AST 20 05/02/2025    ALT 19 05/02/2025     BMP:    Lab Results   Component Value Date     05/06/2025    K 3.9 05/06/2025     05/06/2025    CO2 23 05/06/2025    BUN 16 05/06/2025     "CREATININE 0.84 05/06/2025    CALCIUM 9.7 05/06/2025    GLUCOSE 81 05/06/2025     Magnesium:  Lab Results   Component Value Date    MG 2.08 05/06/2025     Troponin:  No results found for: \"TROPHS\"  BNP: No results found for: \"BNP\"    Lipid Panel:  No results found for: \"HDL\", \"CHHDL\", \"VLDL\", \"TRIG\", \"NHDL\"     Lab work and imaging results independently reviewed by me         Visit Vitals  /83   Pulse 74   Ht 1.676 m (5' 6\")   Wt 72.3 kg (159 lb 6.3 oz)   SpO2 100%   BMI 25.73 kg/m²   OB Status Postmenopausal   Smoking Status Every Day   BSA 1.83 m²          ECG dated 4/22/2025 independently reviewed   NSR 77,  low voltage        Constitutional:       Appearance: Healthy appearance. Not in distress.   Eyes:      Conjunctiva/sclera: Conjunctivae normal.   Neck:      Vascular: JVD normal.   Pulmonary:      Effort: Pulmonary effort is normal.      Breath sounds: Normal breath sounds.   Cardiovascular:      PMI at left midclavicular line. Normal rate. Regular rhythm. Normal S1. Normal S2.       Murmurs: There is no murmur.      No rub.   Pulses:     Intact distal pulses.   Edema:     Peripheral edema absent.   Abdominal:      General: Bowel sounds are normal.   Musculoskeletal:      Cervical back: Neck supple. Skin:     General: Skin is warm and dry.   Neurological:      Mental Status: Alert and oriented to person, place and time.             Problem List Items Addressed This Visit    None    Dx:   69 YOF with large pericardial effusion s/p pericardiocentesis on 5/02/2025. Anal squamous cell carcinoma s/p chem / radiation with  capecitabine & mitomycin, completed September 2024.   Non-obstructive ASCVD.  Preserved LV systolic function, HTN, HLD & anxiety. Active smoking         Continue colcohine 0.1 mg BID x3 months   Repeat limited echo in 1 month s/p pericardiocentesis   Agree with holding amlodipine and losartan for now   Home BP monitoring   We discussed indications to call the office vs. Going to ER      We " discussed the importance of complete smoking cessation and pharmacological options for assistance for 5 minutes. She declines assistance           05/12/25 at 10:44 AM - TICO Ordonez-CNP  Follow up 4 months for symptom check and review test results   I will call in the meantime with echo results       Followup Appts:  Future Appointments   Date Time Provider Department Center   7/8/2025 10:30 AM Anant Callahan MD [id number] [facility]   8/6/2025 10:15 AM GEN CT 1 GENCT [facility]   8/27/2025 11:20 AM Cristian Álvarez MD [id number] [facility]   9/18/2025 10:00 AM Luz Zhao MD [id number] [facility]   11/4/2025 11:30 AM Jorge Padilla MD [id number] [facility]          [1]   Past Medical History:  Diagnosis Date    Acute pain of right shoulder 09/15/2016    Anal cancer (Multi)     Chest pain 07/24/2016    Depression 01/23/2012    GERD (gastroesophageal reflux disease) 05/15/2015    Hypertension [id number]    Nausea 02/18/2022    Squamous cell carcinoma of anus     Vitamin D deficiency 12/14/2015   [2]   Past Surgical History:  Procedure Laterality Date    CARDIAC CATHETERIZATION N/A 5/2/2025    Procedure: Pericardiocentesis;  Surgeon: Zeinab Spencer MD;  Location: [place] [address] Cardiac Cath Lab;  Service: Cardiovascular;  Laterality: N/A;  stat    CARDIAC CATHETERIZATION N/A 5/2/2025    Procedure: LHC, No LV;  Surgeon: Zeinab Spencer MD;  Location: [place] [address] Cardiac Cath Lab;  Service: Cardiovascular;  Laterality: N/A;  stat    CARDIAC CATHETERIZATION N/A 5/2/2025    Procedure: RHC;  Surgeon: Zeinab Spencer MD;  Location: [place] [address] Cardiac Cath Lab;  Service: Cardiovascular;  Laterality: N/A;  stat    COLONOSCOPY  07/2024    ESOPHAGOGASTRODUODENOSCOPY      HERNIA REPAIR  1978   [3]   Family History  Problem Relation Name Age of Onset    Colon cancer Mother Regla Kotila     Melanoma Mother Regla Kotila     Cancer Mother Regla Kotila     Diabetes type II Mother Regla Kotila     Prostate cancer Father  Lester Fernández     Cancer Father Lester Fernández     Heart attack Father Lester Fernández     Breast cancer Mother's Sister      Lung cancer Mother's Sister      Lung cancer Mother's Brother      Cancer Mother's Sister Sylvie Daniel     Cancer Mother's Sister Piper Palma     Cancer Mother's Brother Anatoly Cool

## 2025-05-14 LAB
ACID FAST STN SPEC: NORMAL
MYCOBACTERIUM SPEC CULT: NORMAL

## 2025-05-18 LAB
FUNGUS SPEC CULT: NORMAL
FUNGUS SPEC FUNGUS STN: NORMAL

## 2025-05-21 LAB
ACID FAST STN SPEC: NORMAL
MYCOBACTERIUM SPEC CULT: NORMAL

## 2025-05-28 LAB
ACID FAST STN SPEC: NORMAL
MYCOBACTERIUM SPEC CULT: NORMAL

## 2025-06-04 LAB
ACID FAST STN SPEC: NORMAL
MYCOBACTERIUM SPEC CULT: NORMAL

## 2025-06-11 LAB
ACID FAST STN SPEC: NORMAL
MYCOBACTERIUM SPEC CULT: NORMAL

## 2025-06-12 ENCOUNTER — HOSPITAL ENCOUNTER (OUTPATIENT)
Dept: CARDIOLOGY | Facility: HOSPITAL | Age: 70
Discharge: HOME | End: 2025-06-12
Payer: MEDICARE

## 2025-06-12 DIAGNOSIS — I31.39 PERICARDIAL EFFUSION (HHS-HCC): ICD-10-CM

## 2025-06-12 LAB
AORTIC VALVE PEAK VELOCITY: 1.24 M/S
AV PEAK GRADIENT: 6 MMHG
AVA (PEAK VEL): 2 CM2
EJECTION FRACTION APICAL 4 CHAMBER: 56.8
EJECTION FRACTION: 58 %
LEFT ATRIUM VOLUME AREA LENGTH INDEX BSA: 18.7 ML/M2
LEFT VENTRICLE INTERNAL DIMENSION DIASTOLE: 3.49 CM (ref 3.5–6)
LEFT VENTRICULAR OUTFLOW TRACT DIAMETER: 1.8 CM
MITRAL VALVE E/A RATIO: 1.11
RIGHT VENTRICLE FREE WALL PEAK S': 9.9 CM/S
RIGHT VENTRICLE PEAK SYSTOLIC PRESSURE: 22 MMHG
TRICUSPID ANNULAR PLANE SYSTOLIC EXCURSION: 2.1 CM

## 2025-06-12 PROCEDURE — 93306 TTE W/DOPPLER COMPLETE: CPT

## 2025-06-12 PROCEDURE — 93306 TTE W/DOPPLER COMPLETE: CPT | Performed by: INTERNAL MEDICINE

## 2025-06-18 LAB
ACID FAST STN SPEC: NORMAL
MYCOBACTERIUM SPEC CULT: NORMAL

## 2025-06-25 LAB
ACID FAST STN SPEC: NORMAL
MYCOBACTERIUM SPEC CULT: NORMAL

## 2025-06-27 DIAGNOSIS — C21.0 SQUAMOUS CELL CANCER, ANUS (MULTI): ICD-10-CM

## 2025-07-01 RX ORDER — FAMOTIDINE 40 MG/1
40 TABLET, FILM COATED ORAL 2 TIMES DAILY
Qty: 180 TABLET | Refills: 0 | Status: SHIPPED | OUTPATIENT
Start: 2025-07-01

## 2025-07-08 ENCOUNTER — APPOINTMENT (OUTPATIENT)
Dept: UROLOGY | Facility: CLINIC | Age: 70
End: 2025-07-08
Payer: MEDICARE

## 2025-08-01 ENCOUNTER — LAB (OUTPATIENT)
Dept: LAB | Facility: HOSPITAL | Age: 70
End: 2025-08-01
Payer: MEDICARE

## 2025-08-01 DIAGNOSIS — C21.0 SQUAMOUS CELL CANCER, ANUS (MULTI): ICD-10-CM

## 2025-08-01 LAB
ALBUMIN SERPL BCP-MCNC: 4.3 G/DL (ref 3.4–5)
ALP SERPL-CCNC: 98 U/L (ref 33–136)
ALT SERPL W P-5'-P-CCNC: 23 U/L (ref 7–45)
ANION GAP SERPL CALC-SCNC: 13 MMOL/L (ref 10–20)
AST SERPL W P-5'-P-CCNC: 22 U/L (ref 9–39)
BASOPHILS # BLD AUTO: 0.02 X10*3/UL (ref 0–0.1)
BASOPHILS NFR BLD AUTO: 0.6 %
BILIRUB SERPL-MCNC: 0.6 MG/DL (ref 0–1.2)
BUN SERPL-MCNC: 18 MG/DL (ref 6–23)
CALCIUM SERPL-MCNC: 9.5 MG/DL (ref 8.6–10.3)
CHLORIDE SERPL-SCNC: 103 MMOL/L (ref 98–107)
CO2 SERPL-SCNC: 27 MMOL/L (ref 21–32)
CREAT SERPL-MCNC: 0.95 MG/DL (ref 0.5–1.05)
EGFRCR SERPLBLD CKD-EPI 2021: 65 ML/MIN/1.73M*2
EOSINOPHIL # BLD AUTO: 0.07 X10*3/UL (ref 0–0.7)
EOSINOPHIL NFR BLD AUTO: 2 %
ERYTHROCYTE [DISTWIDTH] IN BLOOD BY AUTOMATED COUNT: 15.4 % (ref 11.5–14.5)
GLUCOSE SERPL-MCNC: 88 MG/DL (ref 74–99)
HCT VFR BLD AUTO: 42.2 % (ref 36–46)
HGB BLD-MCNC: 14.1 G/DL (ref 12–16)
IMM GRANULOCYTES # BLD AUTO: 0.01 X10*3/UL (ref 0–0.7)
IMM GRANULOCYTES NFR BLD AUTO: 0.3 % (ref 0–0.9)
LYMPHOCYTES # BLD AUTO: 0.65 X10*3/UL (ref 1.2–4.8)
LYMPHOCYTES NFR BLD AUTO: 18.2 %
MAGNESIUM SERPL-MCNC: 1.93 MG/DL (ref 1.6–2.4)
MCH RBC QN AUTO: 32.1 PG (ref 26–34)
MCHC RBC AUTO-ENTMCNC: 33.4 G/DL (ref 32–36)
MCV RBC AUTO: 96 FL (ref 80–100)
MONOCYTES # BLD AUTO: 0.48 X10*3/UL (ref 0.1–1)
MONOCYTES NFR BLD AUTO: 13.4 %
NEUTROPHILS # BLD AUTO: 2.34 X10*3/UL (ref 1.2–7.7)
NEUTROPHILS NFR BLD AUTO: 65.5 %
NRBC BLD-RTO: 0 /100 WBCS (ref 0–0)
PLATELET # BLD AUTO: 249 X10*3/UL (ref 150–450)
POTASSIUM SERPL-SCNC: 4.3 MMOL/L (ref 3.5–5.3)
PROT SERPL-MCNC: 7 G/DL (ref 6.4–8.2)
RBC # BLD AUTO: 4.39 X10*6/UL (ref 4–5.2)
SODIUM SERPL-SCNC: 139 MMOL/L (ref 136–145)
WBC # BLD AUTO: 3.6 X10*3/UL (ref 4.4–11.3)

## 2025-08-01 PROCEDURE — 85025 COMPLETE CBC W/AUTO DIFF WBC: CPT

## 2025-08-01 PROCEDURE — 80053 COMPREHEN METABOLIC PANEL: CPT

## 2025-08-01 PROCEDURE — 83735 ASSAY OF MAGNESIUM: CPT

## 2025-08-06 ENCOUNTER — HOSPITAL ENCOUNTER (OUTPATIENT)
Dept: RADIOLOGY | Facility: HOSPITAL | Age: 70
Discharge: HOME | End: 2025-08-06
Payer: MEDICARE

## 2025-08-06 DIAGNOSIS — C21.0 SQUAMOUS CELL CANCER, ANUS (MULTI): ICD-10-CM

## 2025-08-06 PROCEDURE — 71260 CT THORAX DX C+: CPT | Performed by: RADIOLOGY

## 2025-08-06 PROCEDURE — 74177 CT ABD & PELVIS W/CONTRAST: CPT | Performed by: RADIOLOGY

## 2025-08-06 PROCEDURE — 2550000001 HC RX 255 CONTRASTS: Performed by: INTERNAL MEDICINE

## 2025-08-06 PROCEDURE — 74177 CT ABD & PELVIS W/CONTRAST: CPT

## 2025-08-06 RX ADMIN — IOHEXOL 75 ML: 350 INJECTION, SOLUTION INTRAVENOUS at 10:35

## 2025-08-13 ENCOUNTER — APPOINTMENT (OUTPATIENT)
Dept: HEMATOLOGY/ONCOLOGY | Facility: HOSPITAL | Age: 70
End: 2025-08-13
Payer: MEDICARE

## 2025-08-16 DIAGNOSIS — C21.0 SQUAMOUS CELL CANCER, ANUS (MULTI): ICD-10-CM

## 2025-08-21 ENCOUNTER — TELEPHONE (OUTPATIENT)
Dept: HEMATOLOGY/ONCOLOGY | Facility: HOSPITAL | Age: 70
End: 2025-08-21
Payer: MEDICARE

## 2025-08-21 RX ORDER — COLCHICINE 0.6 MG/1
1 TABLET ORAL
COMMUNITY
Start: 2025-06-01 | End: 2025-08-22 | Stop reason: ALTCHOICE

## 2025-08-22 ENCOUNTER — APPOINTMENT (OUTPATIENT)
Dept: CARDIOLOGY | Facility: CLINIC | Age: 70
End: 2025-08-22
Payer: MEDICARE

## 2025-08-22 VITALS
HEIGHT: 66 IN | HEART RATE: 74 BPM | WEIGHT: 176 LBS | DIASTOLIC BLOOD PRESSURE: 84 MMHG | SYSTOLIC BLOOD PRESSURE: 154 MMHG | BODY MASS INDEX: 28.28 KG/M2 | OXYGEN SATURATION: 98 %

## 2025-08-22 DIAGNOSIS — I10 PRIMARY HYPERTENSION: Primary | ICD-10-CM

## 2025-08-22 DIAGNOSIS — Z98.890 S/P PERICARDIOCENTESIS: ICD-10-CM

## 2025-08-22 DIAGNOSIS — F17.210 CIGARETTE NICOTINE DEPENDENCE WITHOUT COMPLICATION: ICD-10-CM

## 2025-08-22 DIAGNOSIS — E78.5 DYSLIPIDEMIA: ICD-10-CM

## 2025-08-22 DIAGNOSIS — I31.39 PERICARDIAL EFFUSION (HHS-HCC): ICD-10-CM

## 2025-08-22 DIAGNOSIS — I25.10 CORONARY ARTERY ARTERIOSCLEROSIS: ICD-10-CM

## 2025-08-22 PROCEDURE — 1159F MED LIST DOCD IN RCRD: CPT | Performed by: STUDENT IN AN ORGANIZED HEALTH CARE EDUCATION/TRAINING PROGRAM

## 2025-08-22 PROCEDURE — 3077F SYST BP >= 140 MM HG: CPT | Performed by: STUDENT IN AN ORGANIZED HEALTH CARE EDUCATION/TRAINING PROGRAM

## 2025-08-22 PROCEDURE — 99214 OFFICE O/P EST MOD 30 MIN: CPT | Performed by: STUDENT IN AN ORGANIZED HEALTH CARE EDUCATION/TRAINING PROGRAM

## 2025-08-22 PROCEDURE — 3079F DIAST BP 80-89 MM HG: CPT | Performed by: STUDENT IN AN ORGANIZED HEALTH CARE EDUCATION/TRAINING PROGRAM

## 2025-08-22 PROCEDURE — 99406 BEHAV CHNG SMOKING 3-10 MIN: CPT | Performed by: STUDENT IN AN ORGANIZED HEALTH CARE EDUCATION/TRAINING PROGRAM

## 2025-08-22 PROCEDURE — 3008F BODY MASS INDEX DOCD: CPT | Performed by: STUDENT IN AN ORGANIZED HEALTH CARE EDUCATION/TRAINING PROGRAM

## 2025-08-22 RX ORDER — IBUPROFEN 200 MG
1 TABLET ORAL EVERY 24 HOURS
Qty: 30 PATCH | Refills: 0 | Status: SHIPPED | OUTPATIENT
Start: 2025-08-22 | End: 2025-09-21

## 2025-08-26 ENCOUNTER — OFFICE VISIT (OUTPATIENT)
Dept: HEMATOLOGY/ONCOLOGY | Facility: HOSPITAL | Age: 70
End: 2025-08-26
Payer: MEDICARE

## 2025-08-26 VITALS
WEIGHT: 177.69 LBS | OXYGEN SATURATION: 100 % | HEART RATE: 63 BPM | DIASTOLIC BLOOD PRESSURE: 91 MMHG | BODY MASS INDEX: 28.56 KG/M2 | HEIGHT: 66 IN | SYSTOLIC BLOOD PRESSURE: 146 MMHG | TEMPERATURE: 96.8 F | RESPIRATION RATE: 16 BRPM

## 2025-08-26 DIAGNOSIS — C21.0 SQUAMOUS CELL CANCER, ANUS (MULTI): Primary | ICD-10-CM

## 2025-08-26 DIAGNOSIS — I31.39 PERICARDIAL EFFUSION (HHS-HCC): ICD-10-CM

## 2025-08-26 PROBLEM — R10.13 EPIGASTRIC PAIN: Status: RESOLVED | Noted: 2025-03-05 | Resolved: 2025-08-26

## 2025-08-26 PROBLEM — D72.819 LEUKOPENIA: Status: ACTIVE | Noted: 2025-06-03

## 2025-08-26 PROBLEM — R26.89 BALANCE DISORDER: Status: RESOLVED | Noted: 2022-02-18 | Resolved: 2025-08-26

## 2025-08-26 PROBLEM — E87.5 HYPERKALEMIA: Status: ACTIVE | Noted: 2025-06-03

## 2025-08-26 PROBLEM — E11.9 DIABETES MELLITUS TYPE 2, DIET-CONTROLLED: Status: ACTIVE | Noted: 2025-06-03

## 2025-08-26 PROBLEM — E78.2 HYPERLIPIDEMIA, MIXED: Status: ACTIVE | Noted: 2025-06-03

## 2025-08-26 PROCEDURE — 3080F DIAST BP >= 90 MM HG: CPT | Performed by: INTERNAL MEDICINE

## 2025-08-26 PROCEDURE — 3077F SYST BP >= 140 MM HG: CPT | Performed by: INTERNAL MEDICINE

## 2025-08-26 PROCEDURE — 99215 OFFICE O/P EST HI 40 MIN: CPT | Performed by: INTERNAL MEDICINE

## 2025-08-26 PROCEDURE — 3008F BODY MASS INDEX DOCD: CPT | Performed by: INTERNAL MEDICINE

## 2025-08-26 PROCEDURE — G2211 COMPLEX E/M VISIT ADD ON: HCPCS | Performed by: INTERNAL MEDICINE

## 2025-08-26 PROCEDURE — 1159F MED LIST DOCD IN RCRD: CPT | Performed by: INTERNAL MEDICINE

## 2025-08-26 PROCEDURE — 1126F AMNT PAIN NOTED NONE PRSNT: CPT | Performed by: INTERNAL MEDICINE

## 2025-08-26 ASSESSMENT — ENCOUNTER SYMPTOMS
DIARRHEA: 1
ABDOMINAL DISTENTION: 0
BLOOD IN STOOL: 0
RECTAL PAIN: 1
OCCASIONAL FEELINGS OF UNSTEADINESS: 0
DEPRESSION: 0
MUSCULOSKELETAL NEGATIVE: 1
CONSTITUTIONAL NEGATIVE: 1
ABDOMINAL PAIN: 0
CARDIOVASCULAR NEGATIVE: 1
LOSS OF SENSATION IN FEET: 0

## 2025-08-26 ASSESSMENT — PAIN SCALES - GENERAL: PAINLEVEL_OUTOF10: 0-NO PAIN

## 2025-08-27 ENCOUNTER — APPOINTMENT (OUTPATIENT)
Dept: HEMATOLOGY/ONCOLOGY | Facility: HOSPITAL | Age: 70
End: 2025-08-27
Payer: MEDICARE

## 2025-09-25 ENCOUNTER — APPOINTMENT (OUTPATIENT)
Dept: UROLOGY | Facility: CLINIC | Age: 70
End: 2025-09-25
Payer: MEDICARE

## (undated) DEVICE — ACCESS KIT, MINI MAK, 4 FR X 10CM, 0.018 X 40CM, NITINOL/PLATINUM, STD NEEDLE

## (undated) DEVICE — NITINOL KIT, GLIDESHEATH, 6FR

## (undated) DEVICE — GUIDEWIRE, INQWIRE, 3MM J, .035 X 210CM, FIXED

## (undated) DEVICE — CATHETER, WEDGE PRESSURE, BALLOON, DOUBLE LUMEN, 5 FR, 110 CM

## (undated) DEVICE — TR BAND, RADIAL COMPRESSION, STANDARD, 24CM

## (undated) DEVICE — Device

## (undated) DEVICE — CATHETER, OPTITORQUE, 5FR, TIG, 1H/100CM

## (undated) DEVICE — CATHETER, DIAGNOSTIC, 4FR-MPA2

## (undated) DEVICE — INTRODUCER, GLIDESHEATH SLENDER A-KIT, 5FR 10CM

## (undated) DEVICE — PAD, ELECTRODE DEFIB PADPRO ADULT STRL W/ADAPTER

## (undated) DEVICE — ACCESS KIT, S-MAK MINI, 4FR 10CM 0.018IN 40CM, NT/PT, ECHO ENHANCE NEEDLE

## (undated) DEVICE — GUIDEWIRE, AMPLATZ SUPER STIFF, STR, .035 IN X 75CM

## (undated) DEVICE — CATHETER KIT, PERICARDIOCENTESIS, 8.3FR X 40CM, PIGTAIL SHAPE